# Patient Record
Sex: FEMALE | Race: WHITE | NOT HISPANIC OR LATINO | Employment: OTHER | URBAN - METROPOLITAN AREA
[De-identification: names, ages, dates, MRNs, and addresses within clinical notes are randomized per-mention and may not be internally consistent; named-entity substitution may affect disease eponyms.]

---

## 2023-03-20 ENCOUNTER — EXTERNAL HOSPITAL ADMISSION (OUTPATIENT)
Dept: PRIMARY CARE | Facility: CLINIC | Age: 88
End: 2023-03-20
Payer: MEDICARE

## 2023-03-23 ENCOUNTER — TELEPHONE (OUTPATIENT)
Dept: PRIMARY CARE | Facility: CLINIC | Age: 88
End: 2023-03-23
Payer: MEDICARE

## 2023-03-23 DIAGNOSIS — M54.30 SCIATICA, UNSPECIFIED LATERALITY: Primary | ICD-10-CM

## 2023-03-23 NOTE — TELEPHONE ENCOUNTER
Pt has pain in her sciatic nerve. She found a medicine and is wondering if she can take it? Methylprednisolone 4mg is the medicine. She says you know her best out of her doctors and would know best. She's in a lot of pain after her knee surgery.

## 2023-03-29 RX ORDER — METHYLPREDNISOLONE 4 MG/1
TABLET ORAL
Qty: 21 TABLET | Refills: 0 | Status: SHIPPED | OUTPATIENT
Start: 2023-03-29 | End: 2023-04-05

## 2023-04-28 DIAGNOSIS — K21.9 GERD WITHOUT ESOPHAGITIS: Primary | ICD-10-CM

## 2023-04-28 PROBLEM — R07.89 ATYPICAL CHEST PAIN: Status: ACTIVE | Noted: 2023-04-28

## 2023-04-28 PROBLEM — K44.9 DIAPHRAGMATIC HERNIA: Status: ACTIVE | Noted: 2023-04-28

## 2023-04-28 PROBLEM — M79.10 MYALGIA: Status: ACTIVE | Noted: 2023-04-28

## 2023-04-28 PROBLEM — L98.9 DISORDER OF SKIN OR SUBCUTANEOUS TISSUE: Status: ACTIVE | Noted: 2023-04-28

## 2023-04-28 PROBLEM — M81.0 POSTMENOPAUSAL BONE LOSS: Status: ACTIVE | Noted: 2023-04-28

## 2023-04-28 PROBLEM — L65.9 HAIR LOSS: Status: ACTIVE | Noted: 2023-04-28

## 2023-04-28 PROBLEM — R33.9 INCOMPLETE BLADDER EMPTYING: Status: ACTIVE | Noted: 2023-04-28

## 2023-04-28 PROBLEM — J00 ACUTE NASOPHARYNGITIS: Status: ACTIVE | Noted: 2023-04-28

## 2023-04-28 PROBLEM — R19.5 WATERY STOOLS: Status: ACTIVE | Noted: 2023-04-28

## 2023-04-28 PROBLEM — M17.9 OSTEOARTHRITIS OF KNEE: Status: ACTIVE | Noted: 2023-04-28
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
## 2023-04-28 PROBLEM — N81.6 RECTOCELE, FEMALE: Status: ACTIVE | Noted: 2023-04-28

## 2023-04-28 PROBLEM — R20.2 RIGHT HAND PARESTHESIA: Status: ACTIVE | Noted: 2023-04-28

## 2023-04-28 PROBLEM — M79.604 PAIN OF RIGHT LOWER EXTREMITY: Status: ACTIVE | Noted: 2023-04-28

## 2023-04-28 PROBLEM — M94.9 DISORDER OF BONE AND CARTILAGE: Status: ACTIVE | Noted: 2023-04-28

## 2023-04-28 PROBLEM — M19.90 ARTHRITIS: Status: ACTIVE | Noted: 2023-04-28

## 2023-04-28 PROBLEM — M25.559 PAIN IN JOINT INVOLVING PELVIC REGION AND THIGH: Status: ACTIVE | Noted: 2023-04-28

## 2023-04-28 PROBLEM — R58 ECCHYMOSIS: Status: ACTIVE | Noted: 2023-04-28

## 2023-04-28 PROBLEM — K85.90 ACUTE PANCREATITIS (HHS-HCC): Status: ACTIVE | Noted: 2023-04-28

## 2023-04-28 PROBLEM — G25.81 RESTLESS LEGS SYNDROME: Status: ACTIVE | Noted: 2023-04-28

## 2023-04-28 PROBLEM — R19.7 DIARRHEA, UNSPECIFIED: Status: ACTIVE | Noted: 2023-04-28

## 2023-04-28 PROBLEM — M25.562 LEFT KNEE PAIN: Status: ACTIVE | Noted: 2023-04-28

## 2023-04-28 PROBLEM — F41.9 ANXIETY: Status: ACTIVE | Noted: 2023-04-28

## 2023-04-28 PROBLEM — M75.00 ADHESIVE CAPSULITIS OF SHOULDER: Status: ACTIVE | Noted: 2023-04-28

## 2023-04-28 PROBLEM — E03.9 HYPOTHYROIDISM: Status: ACTIVE | Noted: 2023-04-28

## 2023-04-28 PROBLEM — J06.9 URI (UPPER RESPIRATORY INFECTION): Status: ACTIVE | Noted: 2023-04-28

## 2023-04-28 PROBLEM — R14.1 FLATULENCE, ERUCTATION, AND GAS PAIN: Status: ACTIVE | Noted: 2023-04-28

## 2023-04-28 PROBLEM — D64.9 ANEMIA: Status: ACTIVE | Noted: 2023-04-28

## 2023-04-28 PROBLEM — R14.2 FLATULENCE, ERUCTATION, AND GAS PAIN: Status: ACTIVE | Noted: 2023-04-28

## 2023-04-28 PROBLEM — D73.89 LESION OF SPLEEN: Status: ACTIVE | Noted: 2023-04-28

## 2023-04-28 PROBLEM — R93.3 ABNORMAL CT SCAN, COLON: Status: ACTIVE | Noted: 2023-04-28

## 2023-04-28 PROBLEM — M79.672 PAIN IN BOTH FEET: Status: ACTIVE | Noted: 2023-04-28

## 2023-04-28 PROBLEM — L25.5 CONTACT DERMATITIS DUE TO PLANT: Status: ACTIVE | Noted: 2023-04-28

## 2023-04-28 PROBLEM — R10.84 ACUTE GENERALIZED ABDOMINAL PAIN: Status: ACTIVE | Noted: 2023-04-28

## 2023-04-28 PROBLEM — T14.8XXA ABRASION: Status: ACTIVE | Noted: 2023-04-28

## 2023-04-28 PROBLEM — R30.0 DYSURIA: Status: ACTIVE | Noted: 2023-04-28

## 2023-04-28 PROBLEM — H26.9 CATARACT, LEFT: Status: ACTIVE | Noted: 2023-04-28

## 2023-04-28 PROBLEM — I10 HYPERTENSION: Status: ACTIVE | Noted: 2023-04-28

## 2023-04-28 PROBLEM — L23.7 POISON IVY: Status: ACTIVE | Noted: 2023-04-28

## 2023-04-28 PROBLEM — K57.30 DIVERTICULOSIS OF LARGE INTESTINE: Status: ACTIVE | Noted: 2023-04-28

## 2023-04-28 PROBLEM — H61.23 BILATERAL IMPACTED CERUMEN: Status: ACTIVE | Noted: 2023-04-28

## 2023-04-28 PROBLEM — G47.33 OBSTRUCTIVE SLEEP APNEA SYNDROME: Status: ACTIVE | Noted: 2023-04-28

## 2023-04-28 PROBLEM — F34.1 PRIMARY DYSTHYMIA: Status: ACTIVE | Noted: 2023-04-28

## 2023-04-28 PROBLEM — I05.9 MITRAL VALVE DISEASE: Status: ACTIVE | Noted: 2023-04-28

## 2023-04-28 PROBLEM — R14.3 FLATULENCE, ERUCTATION, AND GAS PAIN: Status: ACTIVE | Noted: 2023-04-28

## 2023-04-28 PROBLEM — M89.9 DISORDER OF BONE AND CARTILAGE: Status: ACTIVE | Noted: 2023-04-28

## 2023-04-28 PROBLEM — L23.1 ALLERGIC CONTACT DERMATITIS DUE TO ADHESIVES: Status: ACTIVE | Noted: 2023-04-28

## 2023-04-28 PROBLEM — R31.9 HEMATURIA: Status: ACTIVE | Noted: 2023-04-28

## 2023-04-28 PROBLEM — R15.9 FECAL INCONTINENCE: Status: ACTIVE | Noted: 2023-04-28

## 2023-04-28 PROBLEM — H53.9 VISION CHANGES: Status: ACTIVE | Noted: 2023-04-28

## 2023-04-28 PROBLEM — J01.00 ACUTE MAXILLARY SINUSITIS: Status: ACTIVE | Noted: 2023-04-28

## 2023-04-28 PROBLEM — G56.01 CARPAL TUNNEL SYNDROME OF RIGHT WRIST: Status: ACTIVE | Noted: 2023-04-28

## 2023-04-28 PROBLEM — N39.0 ACUTE UTI: Status: ACTIVE | Noted: 2023-04-28

## 2023-04-28 PROBLEM — E04.1 NONTOXIC SINGLE THYROID NODULE: Status: ACTIVE | Noted: 2023-04-28

## 2023-04-28 PROBLEM — R35.0 INCREASED FREQUENCY OF URINATION: Status: ACTIVE | Noted: 2023-04-28

## 2023-04-28 PROBLEM — N81.11 MIDLINE CYSTOCELE: Status: ACTIVE | Noted: 2023-04-28

## 2023-04-28 PROBLEM — T36.95XA ANTIBIOTIC-INDUCED ALLERGIC RASH: Status: ACTIVE | Noted: 2023-04-28

## 2023-04-28 PROBLEM — R20.9 SKIN SENSATION DISTURBANCE: Status: ACTIVE | Noted: 2023-04-28

## 2023-04-28 PROBLEM — R13.10 SWALLOWING DISORDER: Status: ACTIVE | Noted: 2023-04-28

## 2023-04-28 PROBLEM — R25.2 LIMB CRAMP: Status: ACTIVE | Noted: 2023-04-28

## 2023-04-28 PROBLEM — S61.411A LACERATION OF RIGHT HAND WITHOUT FOREIGN BODY: Status: ACTIVE | Noted: 2023-04-28

## 2023-04-28 PROBLEM — R35.1 NOCTURIA: Status: ACTIVE | Noted: 2023-04-28

## 2023-04-28 PROBLEM — E78.5 HYPERLIPIDEMIA: Status: ACTIVE | Noted: 2023-04-28

## 2023-04-28 PROBLEM — M79.671 PAIN IN BOTH FEET: Status: ACTIVE | Noted: 2023-04-28

## 2023-04-28 PROBLEM — M54.2 NECK PAIN: Status: ACTIVE | Noted: 2023-04-28

## 2023-04-28 PROBLEM — E55.9 VITAMIN D DEFICIENCY: Status: ACTIVE | Noted: 2023-04-28

## 2023-04-28 PROBLEM — R26.81 UNSTEADY GAIT: Status: ACTIVE | Noted: 2023-04-28

## 2023-04-28 PROBLEM — M79.89 SWELLING OF LEFT LOWER EXTREMITY: Status: ACTIVE | Noted: 2023-04-28

## 2023-04-28 PROBLEM — R41.3 MEMORY LOSS: Status: ACTIVE | Noted: 2023-04-28

## 2023-04-28 PROBLEM — R10.32 ABDOMINAL PAIN, LLQ (LEFT LOWER QUADRANT): Status: ACTIVE | Noted: 2023-04-28

## 2023-04-28 PROBLEM — L27.0 ANTIBIOTIC-INDUCED ALLERGIC RASH: Status: ACTIVE | Noted: 2023-04-28

## 2023-04-28 PROBLEM — T36.8X5A: Status: ACTIVE | Noted: 2023-04-28

## 2023-04-28 PROBLEM — K57.92 ACUTE DIVERTICULITIS: Status: ACTIVE | Noted: 2023-04-28

## 2023-04-28 RX ORDER — PANCRELIPASE 24000; 76000; 120000 [USP'U]/1; [USP'U]/1; [USP'U]/1
CAPSULE, DELAYED RELEASE PELLETS ORAL
COMMUNITY
Start: 2022-09-28 | End: 2023-06-16 | Stop reason: ALTCHOICE

## 2023-04-28 RX ORDER — PANTOPRAZOLE SODIUM 20 MG/1
1 TABLET, DELAYED RELEASE ORAL DAILY
COMMUNITY
Start: 2023-01-16 | End: 2023-05-01 | Stop reason: SDUPTHER

## 2023-04-28 RX ORDER — ATENOLOL 25 MG/1
TABLET ORAL
COMMUNITY
End: 2023-06-16 | Stop reason: ALTCHOICE

## 2023-04-28 RX ORDER — MULTIVITAMIN
1 TABLET ORAL DAILY
COMMUNITY

## 2023-04-28 RX ORDER — OMEPRAZOLE 20 MG/1
1 CAPSULE, DELAYED RELEASE ORAL DAILY
COMMUNITY
Start: 2021-11-01 | End: 2023-11-09 | Stop reason: WASHOUT

## 2023-04-28 RX ORDER — POLYETHYLENE GLYCOL 3350 17 G/17G
POWDER, FOR SOLUTION ORAL
COMMUNITY
Start: 2022-05-21 | End: 2023-06-16 | Stop reason: ALTCHOICE

## 2023-04-28 RX ORDER — TROSPIUM CHLORIDE ER 60 MG/1
1 CAPSULE ORAL DAILY
COMMUNITY
Start: 2022-12-20 | End: 2023-06-16 | Stop reason: ALTCHOICE

## 2023-04-28 RX ORDER — ONDANSETRON 4 MG/1
TABLET, FILM COATED ORAL
COMMUNITY
Start: 2022-06-16 | End: 2023-06-16 | Stop reason: ALTCHOICE

## 2023-04-28 RX ORDER — ACETAMINOPHEN 325 MG/1
TABLET ORAL EVERY 4 HOURS PRN
COMMUNITY
Start: 2022-05-21 | End: 2023-12-18 | Stop reason: WASHOUT

## 2023-04-28 RX ORDER — DOCUSATE SODIUM 100 MG/1
CAPSULE, LIQUID FILLED ORAL
COMMUNITY
Start: 2022-05-21 | End: 2023-06-16 | Stop reason: ALTCHOICE

## 2023-04-28 RX ORDER — FLUTICASONE PROPIONATE 50 MCG
2 SPRAY, SUSPENSION (ML) NASAL DAILY
COMMUNITY
Start: 2021-08-23 | End: 2023-11-09 | Stop reason: WASHOUT

## 2023-04-28 RX ORDER — CHOLECALCIFEROL (VITAMIN D3) 25 MCG
TABLET ORAL
COMMUNITY
End: 2023-06-16 | Stop reason: SDUPTHER

## 2023-04-28 RX ORDER — METOPROLOL SUCCINATE 25 MG/1
25 TABLET, EXTENDED RELEASE ORAL DAILY
COMMUNITY
End: 2023-06-16 | Stop reason: SDUPTHER

## 2023-04-28 RX ORDER — TRAMADOL HYDROCHLORIDE 50 MG/1
1 TABLET ORAL EVERY 6 HOURS PRN
COMMUNITY
Start: 2023-02-09 | End: 2023-06-16 | Stop reason: ALTCHOICE

## 2023-04-28 RX ORDER — LEVOTHYROXINE SODIUM 75 UG/1
75 TABLET ORAL DAILY
COMMUNITY
End: 2023-06-12

## 2023-05-01 RX ORDER — PANTOPRAZOLE SODIUM 20 MG/1
TABLET, DELAYED RELEASE ORAL
Qty: 90 TABLET | Refills: 0 | Status: SHIPPED | OUTPATIENT
Start: 2023-05-01 | End: 2023-06-16 | Stop reason: ALTCHOICE

## 2023-06-01 ENCOUNTER — TELEPHONE (OUTPATIENT)
Dept: PRIMARY CARE | Facility: CLINIC | Age: 88
End: 2023-06-01
Payer: MEDICARE

## 2023-06-10 DIAGNOSIS — E03.9 HYPOTHYROIDISM, UNSPECIFIED TYPE: Primary | ICD-10-CM

## 2023-06-12 PROBLEM — B34.9 ACUTE VIRAL SYNDROME: Status: ACTIVE | Noted: 2023-06-12

## 2023-06-12 PROBLEM — R21 FACIAL RASH: Status: ACTIVE | Noted: 2023-06-12

## 2023-06-12 PROBLEM — N64.4 NIPPLE PAIN: Status: ACTIVE | Noted: 2023-06-12

## 2023-06-12 PROBLEM — K57.90 DIVERTICULOSIS: Status: ACTIVE | Noted: 2023-06-12

## 2023-06-12 PROBLEM — R25.2 MUSCLE CRAMPS: Status: ACTIVE | Noted: 2023-06-12

## 2023-06-12 PROBLEM — L23.9 CONTACT DERMATITIS, ALLERGIC: Status: ACTIVE | Noted: 2023-06-12

## 2023-06-12 PROBLEM — J01.90 ACUTE SINUSITIS: Status: ACTIVE | Noted: 2023-06-12

## 2023-06-12 PROBLEM — E04.1 COLLOID THYROID NODULE: Status: ACTIVE | Noted: 2023-06-12

## 2023-06-12 PROBLEM — I34.1 MITRAL VALVE PROLAPSE: Status: ACTIVE | Noted: 2023-06-12

## 2023-06-12 PROBLEM — L98.9 SKIN LESIONS: Status: ACTIVE | Noted: 2023-06-12

## 2023-06-12 PROBLEM — L30.9 DERMATITIS: Status: ACTIVE | Noted: 2023-06-12

## 2023-06-12 PROBLEM — H61.21 IMPACTED CERUMEN OF RIGHT EAR: Status: ACTIVE | Noted: 2023-06-12

## 2023-06-12 PROBLEM — M62.81 MUSCLE WEAKNESS OF LOWER EXTREMITY: Status: ACTIVE | Noted: 2023-06-12

## 2023-06-12 PROBLEM — H26.9 CORTICAL CATARACT OF RIGHT EYE: Status: ACTIVE | Noted: 2023-06-12

## 2023-06-12 PROBLEM — G47.62 NOCTURNAL LEG CRAMPS: Status: ACTIVE | Noted: 2023-06-12
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
## 2023-06-12 PROBLEM — N30.90 CYSTITIS: Status: ACTIVE | Noted: 2023-06-12

## 2023-06-12 PROBLEM — K21.9 GASTROESOPHAGEAL REFLUX DISEASE: Status: ACTIVE | Noted: 2023-06-12

## 2023-06-12 PROBLEM — R07.89 OTHER CHEST PAIN: Status: ACTIVE | Noted: 2023-06-12

## 2023-06-12 PROBLEM — M06.372: Status: ACTIVE | Noted: 2023-06-12

## 2023-06-12 RX ORDER — ROSUVASTATIN CALCIUM 5 MG/1
1 TABLET, COATED ORAL NIGHTLY
COMMUNITY
Start: 2023-05-08 | End: 2023-06-16 | Stop reason: ALTCHOICE

## 2023-06-12 RX ORDER — CHOLECALCIFEROL (VITAMIN D3) 50 MCG
50 TABLET ORAL DAILY
COMMUNITY
End: 2023-06-16 | Stop reason: ALTCHOICE

## 2023-06-12 RX ORDER — LEVOTHYROXINE SODIUM 75 UG/1
TABLET ORAL
Qty: 90 TABLET | Refills: 0 | Status: SHIPPED | OUTPATIENT
Start: 2023-06-12 | End: 2023-06-16 | Stop reason: SDUPTHER

## 2023-06-16 ENCOUNTER — OFFICE VISIT (OUTPATIENT)
Dept: PRIMARY CARE | Facility: CLINIC | Age: 88
End: 2023-06-16
Payer: MEDICARE

## 2023-06-16 VITALS
WEIGHT: 154 LBS | RESPIRATION RATE: 14 BRPM | HEART RATE: 64 BPM | OXYGEN SATURATION: 98 % | SYSTOLIC BLOOD PRESSURE: 112 MMHG | TEMPERATURE: 96.8 F | BODY MASS INDEX: 27.29 KG/M2 | HEIGHT: 63 IN | DIASTOLIC BLOOD PRESSURE: 64 MMHG

## 2023-06-16 DIAGNOSIS — E78.5 HYPERLIPIDEMIA, UNSPECIFIED HYPERLIPIDEMIA TYPE: ICD-10-CM

## 2023-06-16 DIAGNOSIS — I10 HYPERTENSION, UNSPECIFIED TYPE: Primary | ICD-10-CM

## 2023-06-16 DIAGNOSIS — R41.3 MEMORY LOSS: ICD-10-CM

## 2023-06-16 DIAGNOSIS — E55.9 VITAMIN D DEFICIENCY: ICD-10-CM

## 2023-06-16 DIAGNOSIS — L65.9 HAIR LOSS: ICD-10-CM

## 2023-06-16 DIAGNOSIS — N64.4 BREAST TENDERNESS IN FEMALE: ICD-10-CM

## 2023-06-16 DIAGNOSIS — E03.9 HYPOTHYROIDISM, UNSPECIFIED TYPE: ICD-10-CM

## 2023-06-16 DIAGNOSIS — Z12.31 VISIT FOR SCREENING MAMMOGRAM: ICD-10-CM

## 2023-06-16 DIAGNOSIS — H53.9 VISION CHANGES: ICD-10-CM

## 2023-06-16 PROCEDURE — 3078F DIAST BP <80 MM HG: CPT | Performed by: INTERNAL MEDICINE

## 2023-06-16 PROCEDURE — 99214 OFFICE O/P EST MOD 30 MIN: CPT | Performed by: INTERNAL MEDICINE

## 2023-06-16 PROCEDURE — 1036F TOBACCO NON-USER: CPT | Performed by: INTERNAL MEDICINE

## 2023-06-16 PROCEDURE — 3074F SYST BP LT 130 MM HG: CPT | Performed by: INTERNAL MEDICINE

## 2023-06-16 PROCEDURE — 1159F MED LIST DOCD IN RCRD: CPT | Performed by: INTERNAL MEDICINE

## 2023-06-16 RX ORDER — ROSUVASTATIN CALCIUM 5 MG/1
5 TABLET, COATED ORAL NIGHTLY
Qty: 90 TABLET | Refills: 1 | Status: CANCELLED | OUTPATIENT
Start: 2023-06-16

## 2023-06-16 RX ORDER — METOPROLOL SUCCINATE 25 MG/1
25 TABLET, EXTENDED RELEASE ORAL DAILY
Qty: 90 TABLET | Refills: 1 | Status: SHIPPED | OUTPATIENT
Start: 2023-06-16 | End: 2023-11-09 | Stop reason: SDUPTHER

## 2023-06-16 RX ORDER — ATENOLOL 25 MG/1
25 TABLET ORAL DAILY
Qty: 90 TABLET | Refills: 1 | Status: CANCELLED | OUTPATIENT
Start: 2023-06-16

## 2023-06-16 RX ORDER — LEVOTHYROXINE SODIUM 75 UG/1
75 TABLET ORAL DAILY
Qty: 90 TABLET | Refills: 1 | Status: SHIPPED | OUTPATIENT
Start: 2023-06-16 | End: 2024-01-31

## 2023-06-16 RX ORDER — CHOLECALCIFEROL (VITAMIN D3) 25 MCG
25 TABLET ORAL DAILY
Qty: 90 TABLET | Refills: 1 | Status: SHIPPED | OUTPATIENT
Start: 2023-06-16 | End: 2024-02-05 | Stop reason: SDUPTHER

## 2023-06-16 SDOH — ECONOMIC STABILITY: FOOD INSECURITY: WITHIN THE PAST 12 MONTHS, YOU WORRIED THAT YOUR FOOD WOULD RUN OUT BEFORE YOU GOT MONEY TO BUY MORE.: NEVER TRUE

## 2023-06-16 SDOH — ECONOMIC STABILITY: FOOD INSECURITY: WITHIN THE PAST 12 MONTHS, THE FOOD YOU BOUGHT JUST DIDN'T LAST AND YOU DIDN'T HAVE MONEY TO GET MORE.: NEVER TRUE

## 2023-06-16 ASSESSMENT — LIFESTYLE VARIABLES
HOW OFTEN DO YOU HAVE A DRINK CONTAINING ALCOHOL: NEVER
AUDIT-C TOTAL SCORE: 0
HOW OFTEN DO YOU HAVE SIX OR MORE DRINKS ON ONE OCCASION: NEVER
SKIP TO QUESTIONS 9-10: 1
HOW MANY STANDARD DRINKS CONTAINING ALCOHOL DO YOU HAVE ON A TYPICAL DAY: PATIENT DOES NOT DRINK

## 2023-06-16 ASSESSMENT — ENCOUNTER SYMPTOMS
OCCASIONAL FEELINGS OF UNSTEADINESS: 0
DEPRESSION: 0
LOSS OF SENSATION IN FEET: 0

## 2023-06-16 ASSESSMENT — PATIENT HEALTH QUESTIONNAIRE - PHQ9
2. FEELING DOWN, DEPRESSED OR HOPELESS: NOT AT ALL
SUM OF ALL RESPONSES TO PHQ9 QUESTIONS 1 & 2: 0
1. LITTLE INTEREST OR PLEASURE IN DOING THINGS: NOT AT ALL

## 2023-06-16 ASSESSMENT — PAIN SCALES - GENERAL: PAINLEVEL: 0-NO PAIN

## 2023-06-16 NOTE — PROGRESS NOTES
"Chief Complaint/HPI:  Patient is s/p left total knee replacement per Dr Garcia, she is now active and doing well     memory issues: memory issues have not changed now, she is stable. She says her memory has been \"lousy\" she states    S/P pancreatitis: patient has no appetite now, she does get pain under the left breast in the evening. This seems to occur when the patient sits down and relaxes. She does see cardiology also. Patient does have a history of left breast surgery     right hand numbness: patient only notes numbness in the hand when she wakes up in the morning. Patient does get some left thumb pain also.     HTN: takes metoprolol. BP is good in the office today.     patient loses balance easily, this has been going on for years. Patient gets intermittent double vision, the symptoms come and go after closing and opening eyes several times. Patient has appointment with eye doctor next week.  Gets occasional HA in the evening     hyperlipidemia: patient no longer takes statin therapy, it caused leg cramps, she no longer takes it     hypothyroidism: takes levothyroxine daily     urinary frequency: urinates frequently at night, she no longer takes trospium, she has not seen Dr Ray for some time, she did not feel well after she had pancreatitis     KATHRYN: patient states that she had sleep study completed at [facility], she has not heard anything since that time, sleep study is noted in the med record     patient loses balance easily, this has been going on for years.       ROS otherwise negative aside from what was mentioned above in HPI.      Patient Active Problem List   Diagnosis    Abdominal pain, LLQ (left lower quadrant)    Abnormal CT scan, colon    Abrasion    Accidental fall    Acute diverticulitis    Acute generalized abdominal pain    Acute maxillary sinusitis    Acute nasopharyngitis    Acute pancreatitis    Acute UTI    Adhesive capsulitis of shoulder    Allergic contact dermatitis due to adhesives "    Anemia    Anxiety    Arthritis    Bilateral impacted cerumen    Carpal tunnel syndrome of right wrist    Cataract, left    Clindamycin adverse reaction    Contact dermatitis due to plant    Diaphragmatic hernia    Diarrhea, unspecified    Disorder of bone and cartilage    Disorder of skin or subcutaneous tissue    Diverticulosis of large intestine    Dysuria    Ecchymosis    Fecal incontinence    Flatulence, eructation, and gas pain    GERD without esophagitis    Hair loss    Hematuria    Hyperlipidemia    Hypertension    Hypothyroidism    Incomplete bladder emptying    Increased frequency of urination    Laceration of right hand without foreign body    Left knee pain    Lesion of spleen    Memory loss    Midline cystocele    Mitral valve disease    Limb cramp    Myalgia    Neck pain    Atypical chest pain    Nocturia    Nontoxic single thyroid nodule    Obstructive sleep apnea syndrome    Osteoarthritis of knee    Pain in both feet    Pain in joint involving pelvic region and thigh    Pain of right lower extremity    Poison ivy    Postmenopausal bone loss    Primary dysthymia    Rectocele, female    Restless legs syndrome    Right hand paresthesia    Antibiotic-induced allergic rash    Skin sensation disturbance    Swallowing disorder    Swelling of left lower extremity    Unsteady gait    URI (upper respiratory infection)    Vision changes    Vitamin D deficiency    Watery stools    Dermatitis    Unknown cause of injury    Colloid thyroid nodule    Acute sinusitis    Acute viral syndrome    Contact dermatitis, allergic    Rheumatoid nodule, left ankle and foot (CMS/HCC)    Facial rash    Skin lesions    Cystitis    Nipple pain    Other chest pain    Impacted cerumen of right ear    Cortical cataract of right eye    Diverticulosis    Gastroesophageal reflux disease    Muscle cramps    Muscle weakness of lower extremity    Nocturnal leg cramps    Mitral valve prolapse    Actinic keratosis    Carcinoma in situ of  skin    Inflamed seborrheic keratosis    Neurogenic bladder    Photoaged skin    Urethral stricture    Functional disorder of bladder         Past Medical History:   Diagnosis Date    Anesthesia of skin     Numbness and tingling in right hand    Personal history of diseases of the blood and blood-forming organs and certain disorders involving the immune mechanism     History of anemia    Personal history of other medical treatment 05/06/2021    History of screening mammography    Unspecified cataract     Cataracts, bilateral     Past Surgical History:   Procedure Laterality Date    BLADDER SURGERY  09/07/2017    Bladder Surgery    BREAST BIOPSY  09/07/2017    Biopsy Breast Open    CARDIAC SURGERY  09/07/2017    Heart Surgery    FOOT SURGERY  09/07/2017    Foot Surgery    HYSTERECTOMY  09/07/2017    Hysterectomy    OTHER SURGICAL HISTORY  09/05/2022    Uvulopalatopharyngoplasty    OTHER SURGICAL HISTORY  03/06/2020    Surgery    OTHER SURGICAL HISTORY  11/09/2019    Cardiac catheterization    OTHER SURGICAL HISTORY  11/09/2019    Tonsillectomy     Social History     Social History Narrative    Not on file         ALLERGIES  Penicillins, Caffeine, Ciprofloxacin, Donepezil, Fexofenadine, Methylprednisolone, Mirabegron, Naproxen, Norfloxacin, Other, Rivastigmine, Sulfamethoxazole-trimethoprim, Clindamycin, Nitrofurantoin, Ofloxacin, Quinolones, Sulfamethoxazole, and Trimethoprim      MEDICATIONS  Current Outpatient Medications on File Prior to Visit   Medication Sig Dispense Refill    acetaminophen (Tylenol) 325 mg tablet Take by mouth every 4 hours if needed.      fluticasone (Flonase) 50 mcg/actuation nasal spray Administer 2 sprays into affected nostril(s) once daily.      multivitamin (Daily Multi-Vitamin) tablet Take 1 tablet by mouth once daily.      omeprazole (PriLOSEC) 20 mg DR capsule Take 1 capsule (20 mg) by mouth once daily.      [DISCONTINUED] atenolol (Tenormin) 25 mg tablet Take by mouth.       "[DISCONTINUED] cholecalciferol (Vitamin D-3) 25 MCG (1000 UT) tablet Take by mouth.      [DISCONTINUED] Creon 24,000-76,000 -120,000 unit capsule       [DISCONTINUED] docusate sodium (Colace) 100 mg capsule       [DISCONTINUED] levothyroxine (Synthroid, Levoxyl) 75 mcg tablet TAKE ONE TABLET BY MOUTH once DAILY 90 tablet 0    [DISCONTINUED] metoprolol succinate XL (Toprol-XL) 25 mg 24 hr tablet Take 1 tablet (25 mg) by mouth once daily.      [DISCONTINUED] ondansetron (Zofran) 4 mg tablet TAKE ONE TABLET BY MOUTH THREE TIMES A DAY AS NEEDED FOR NAUSEA      [DISCONTINUED] pantoprazole (ProtoNix) 20 mg EC tablet TAKE ONE TABLET BY MOUTH DAILY 90 tablet 0    [DISCONTINUED] polyethylene glycol (Glycolax) 17 gram/dose powder       [DISCONTINUED] rosuvastatin (Crestor) 5 mg tablet Take 1 tablet (5 mg) by mouth once daily at bedtime.      [DISCONTINUED] traMADol (Ultram) 50 mg tablet Take 1 tablet (50 mg) by mouth every 6 hours if needed for pain.      [DISCONTINUED] trospium (Sanctura XR) 60 mg 24 hour capsule Take 1 capsule (60 mg) by mouth once daily.      [DISCONTINUED] cholecalciferol (Vitamin D-3) 50 MCG (2000 UT) tablet Take 1 tablet (50 mcg) by mouth once daily.      [DISCONTINUED] levothyroxine (Synthroid, Levoxyl) 75 mcg tablet Take 1 tablet (75 mcg) by mouth once daily.       No current facility-administered medications on file prior to visit.         PHYSICAL EXAM  /64 (BP Location: Left arm, Patient Position: Sitting, BP Cuff Size: Adult)   Pulse 64   Temp 36 °C (96.8 °F) (Temporal)   Resp 14   Ht 1.6 m (5' 3\")   Wt 69.9 kg (154 lb)   SpO2 98%   BMI 27.28 kg/m²   Body mass index is 27.28 kg/m².  CONSTITUTIONAL - well nourished, well developed elderly female, looks like stated age, in no acute distress, not ill-appearing, and not tired appearing     SKIN - normal skin color and pigmentation, normal skin turgor without rash, lesions, or nodules visualized, on exposed skin     HEAD - no trauma, " normocephalic     EYES - EOM grossly intact bilaterally, normal external eyes     NECK - supple without rigidity, no neck mass was observed, no thyromegaly or thyroid nodules     LUNGS - clear to auscultation, no wheezing, no crackles and no rales, good effort     CARDIAC - regular rate and regular rhythm, no skipped beats, no murmur, moderate tenderness inferior to the left breast is noted also     EXTREMITIES - healed scar left anterior knee     NEUROLOGICAL - CNs 2-12 grossly intact bilaterally, no focal signs     PSYCHIATRIC - alert, pleasant and cordial, age-appropriate     IMMUNOLOGIC - no cervical lymphadenopathy     ASSESSMENT/PLAN  Problem List Items Addressed This Visit       Hair loss    Relevant Orders    Zinc    Hyperlipidemia    Current Assessment & Plan     Cholesterol is slightly elevated off of all meds, encourage Mediterranean diet, she has been avoiding seeds since pancreatitis, she does follow up with Dr Melo at [facility] for pancreas issue         Relevant Orders    Comprehensive Metabolic Panel    CBC and Auto Differential    Lipid panel    Hypertension - Primary    Current Assessment & Plan     BP is controlled, no med changes         Relevant Medications    metoprolol succinate XL (Toprol-XL) 25 mg 24 hr tablet    Other Relevant Orders    Albumin , Urine Random    Comprehensive Metabolic Panel    CBC and Auto Differential    Hypothyroidism    Relevant Medications    levothyroxine (Synthroid, Levoxyl) 75 mcg tablet    Other Relevant Orders    TSH with reflex to Free T4 if abnormal    Comprehensive Metabolic Panel    CBC and Auto Differential    Memory loss    Current Assessment & Plan     Memory is stable         Relevant Orders    Comprehensive Metabolic Panel    CBC and Auto Differential    Vision changes    Current Assessment & Plan     Follow up with ophthalmology please, she occasionally gets double vision         Relevant Orders    Comprehensive Metabolic Panel    CBC and Auto Differential     Vitamin D deficiency    Current Assessment & Plan     Check labs         Relevant Medications    cholecalciferol (Vitamin D-3) 25 MCG (1000 UT) tablet    Other Relevant Orders    Vitamin D 1,25 Dihydroxy    Comprehensive Metabolic Panel    CBC and Auto Differential     Check labs as ordered, follow up with ophthalmology, may follow up in the office in 6 months, check mammogram in 9/2023 , follow up with Dr Medina also, recheck the patient in 6 months.  Gian Traylor MD

## 2023-06-16 NOTE — ASSESSMENT & PLAN NOTE
Cholesterol is slightly elevated off of all meds, encourage Mediterranean diet, she has been avoiding seeds since pancreatitis, she does follow up with Dr Melo at [facility] for pancreas issue

## 2023-08-01 DIAGNOSIS — K21.9 GERD WITHOUT ESOPHAGITIS: Primary | ICD-10-CM

## 2023-08-01 RX ORDER — TOLTERODINE 4 MG/1
4 CAPSULE, EXTENDED RELEASE ORAL
COMMUNITY
Start: 2023-06-19 | End: 2023-11-09 | Stop reason: WASHOUT

## 2023-08-01 RX ORDER — DONEPEZIL HYDROCHLORIDE 5 MG/1
5 TABLET, FILM COATED ORAL
COMMUNITY
Start: 2020-11-05 | End: 2023-11-09 | Stop reason: WASHOUT

## 2023-08-01 RX ORDER — NICOTINE POLACRILEX 2 MG
1 GUM BUCCAL
COMMUNITY
End: 2023-11-09 | Stop reason: WASHOUT

## 2023-08-01 RX ORDER — TOLTERODINE 4 MG/1
1 CAPSULE, EXTENDED RELEASE ORAL DAILY
COMMUNITY
Start: 2023-06-19 | End: 2023-11-09 | Stop reason: WASHOUT

## 2023-08-01 RX ORDER — RIVASTIGMINE 4.6 MG/24H
1 PATCH, EXTENDED RELEASE TRANSDERMAL
COMMUNITY
Start: 2020-12-04 | End: 2023-11-09 | Stop reason: WASHOUT

## 2023-08-01 RX ORDER — HYDROCORTISONE, IODOQUINOL 10; 10 MG/G; MG/G
CREAM TOPICAL
COMMUNITY
Start: 2015-09-22 | End: 2023-11-09 | Stop reason: WASHOUT

## 2023-08-01 RX ORDER — ROSUVASTATIN CALCIUM 5 MG/1
TABLET, COATED ORAL
COMMUNITY
Start: 2016-04-18 | End: 2023-11-09 | Stop reason: WASHOUT

## 2023-08-01 RX ORDER — METRONIDAZOLE 7.5 MG/G
GEL TOPICAL
COMMUNITY
Start: 2017-08-17 | End: 2023-11-09 | Stop reason: WASHOUT

## 2023-08-01 RX ORDER — PRAVASTATIN SODIUM 40 MG/1
40 TABLET ORAL EVERY OTHER DAY
COMMUNITY
End: 2023-11-09 | Stop reason: WASHOUT

## 2023-08-01 RX ORDER — PANCRELIPASE 24000; 76000; 120000 [USP'U]/1; [USP'U]/1; [USP'U]/1
1 CAPSULE, DELAYED RELEASE PELLETS ORAL
COMMUNITY
Start: 2022-09-28 | End: 2023-11-09 | Stop reason: WASHOUT

## 2023-08-01 RX ORDER — ATENOLOL 25 MG/1
25 TABLET ORAL
COMMUNITY
End: 2023-11-09 | Stop reason: WASHOUT

## 2023-08-01 RX ORDER — PANTOPRAZOLE SODIUM 20 MG/1
20 TABLET, DELAYED RELEASE ORAL DAILY
Qty: 90 TABLET | Refills: 0 | Status: SHIPPED | OUTPATIENT
Start: 2023-08-01 | End: 2023-08-04

## 2023-08-01 RX ORDER — UREA 40 %
1 CREAM (GRAM) TOPICAL
COMMUNITY
Start: 2020-06-24 | End: 2023-11-09 | Stop reason: WASHOUT

## 2023-08-01 RX ORDER — HYDROCORTISONE 25 MG/G
CREAM TOPICAL
COMMUNITY
Start: 2022-10-12 | End: 2023-11-09 | Stop reason: WASHOUT

## 2023-08-01 RX ORDER — PANTOPRAZOLE SODIUM 20 MG/1
1 TABLET, DELAYED RELEASE ORAL
COMMUNITY
Start: 2023-05-02 | End: 2023-08-01 | Stop reason: SDUPTHER

## 2023-08-01 RX ORDER — SILVER SULFADIAZINE 10 G/1000G
CREAM TOPICAL
COMMUNITY
Start: 2020-12-17 | End: 2023-11-09 | Stop reason: WASHOUT

## 2023-08-04 DIAGNOSIS — K21.9 GERD WITHOUT ESOPHAGITIS: ICD-10-CM

## 2023-08-04 RX ORDER — PANTOPRAZOLE SODIUM 20 MG/1
20 TABLET, DELAYED RELEASE ORAL DAILY
Qty: 90 TABLET | Refills: 0 | Status: SHIPPED | OUTPATIENT
Start: 2023-08-04 | End: 2024-02-13 | Stop reason: SDUPTHER

## 2023-10-04 ENCOUNTER — TELEPHONE (OUTPATIENT)
Dept: SLEEP MEDICINE | Facility: CLINIC | Age: 88
End: 2023-10-04
Payer: MEDICARE

## 2023-10-18 NOTE — TELEPHONE ENCOUNTER
Ok thank you. I sent secure msg in EPIC to Lynsey. Back in Sept 14, I placed order to change settings to 14/8 cm H2O. When I look at download, it was not changed. Pls call pt and let her know that I asked [facility] to change settings.

## 2023-10-19 ENCOUNTER — OFFICE VISIT (OUTPATIENT)
Dept: SLEEP MEDICINE | Facility: CLINIC | Age: 88
End: 2023-10-19
Payer: MEDICARE

## 2023-10-19 VITALS
RESPIRATION RATE: 16 BRPM | HEIGHT: 63 IN | SYSTOLIC BLOOD PRESSURE: 146 MMHG | TEMPERATURE: 98.7 F | HEART RATE: 68 BPM | BODY MASS INDEX: 26.05 KG/M2 | DIASTOLIC BLOOD PRESSURE: 60 MMHG | WEIGHT: 147 LBS

## 2023-10-19 DIAGNOSIS — I10 PRIMARY HYPERTENSION: ICD-10-CM

## 2023-10-19 DIAGNOSIS — G47.33 OBSTRUCTIVE SLEEP APNEA: Primary | ICD-10-CM

## 2023-10-19 PROCEDURE — 1126F AMNT PAIN NOTED NONE PRSNT: CPT | Performed by: INTERNAL MEDICINE

## 2023-10-19 PROCEDURE — 99214 OFFICE O/P EST MOD 30 MIN: CPT | Performed by: INTERNAL MEDICINE

## 2023-10-19 PROCEDURE — 1160F RVW MEDS BY RX/DR IN RCRD: CPT | Performed by: INTERNAL MEDICINE

## 2023-10-19 PROCEDURE — 1159F MED LIST DOCD IN RCRD: CPT | Performed by: INTERNAL MEDICINE

## 2023-10-19 PROCEDURE — 1036F TOBACCO NON-USER: CPT | Performed by: INTERNAL MEDICINE

## 2023-10-19 PROCEDURE — 3077F SYST BP >= 140 MM HG: CPT | Performed by: INTERNAL MEDICINE

## 2023-10-19 PROCEDURE — 3078F DIAST BP <80 MM HG: CPT | Performed by: INTERNAL MEDICINE

## 2023-10-19 ASSESSMENT — SLEEP AND FATIGUE QUESTIONNAIRES: ESS TOTAL SCORE: 4

## 2023-10-19 NOTE — PROGRESS NOTES
[facility] SLEEP [facility]  FOLLOW-UP VISIT NOTE    Clinic Location: [facility]  Service Date: 10/19/2023  PCP: Gian Traylor MD    HISTORY OF PRESENT ILLNESS     Patient ID: Consuelo Andres is a 89 y.o. female who presents for follow-up of KATHRYN after new machine setup.     Patient is here alone today.  To review, patient's medical history is notable for HTN, MVP, seasonal allergies, GERD, hypothyroidism, and KATHRYN s/p UPPP, previously on CPAP with CPAP intolerance.     Interval History  Patient was last seen in 8/15/2023. Plan was to start PAP therapy.  Since last visit, patient has not been using machine due to pressure and mask intolerance.  She denies symptoms of cataplexy, RLS, parasomnias, and shift-work disorder.     SLEEP STUDY HISTORY (personally reviewed raw data such as interpretation report, data sheet, hypnogram, and titration table if available and applicable)  HSAT 3/17/2022: AHI 33.4, RDI 49.7, SpO2 madai 73%, Spent 18 mins with SpO2<89%  PAP titration 9/23/2022: CPAP was started at 4-13 cm H2O. Due to CPAP intolerance at higher CPAP settings, BPAP 13/8 cm H2O was started and increased to 16/8 cm H2O. Due to TECSA at BPAP 15/8 cm H2O, BUR 14 was added which resolved all central events. Hypoxemia and snoring improved as soon as BPAP wss started. No optimal pressure noted in supine sleep. At BPAP-S/T 15/8 cm H2O and BUR 14, RDI 4.8, SpO2 madai 95% but no REM nor supine sleep was present at this pressure setting.    SLEEP-WAKE SCHEDULE  Bedtime: 10:30 PM to 11 PM daily  Subjective sleep latency: 10-15 minutes, sometimes 30 minutes  Difficulty falling asleep: no  Number of awakenings: 1-2 times per night to go to bathroom  Nocturia: yes  Falls back asleep right away  Difficulty staying asleep: yes  Final wake time: 7 AM daily  Out of bed time: 7 AM daily  Shift work: no  Naps: 2-3 times per week for 40 minutes each nap  Feels rested after a nap: Yes  "  Average sleep duration (excluding naps): 8 hours    SLEEP ENVIRONMENT  Sleep location: bed at night, couch during naps  Sleep status: sleeps alone  Preferred sleep position: stomach and side  TV in bedroom: no  Room is dark:  Yes  Room is quiet: Yes  Room is cool: Yes    SOCIAL HISTORY  Smoking: no  ETOH: no  Marijuana: no  Caffeine: no  Sleep aids: no    WEIGHT: stable    Claustrophobia: No     Active Problems, Allergy List, Medication List, and PMH/PSH/FH/Social Hx have been reviewed and reconciled in chart. No significant changes unless documented in the pertinent chart section. Updates made when necessary.     PHYSICAL EXAM     VITAL SIGNS: /60   Pulse 68   Temp 37.1 °C (98.7 °F)   Resp 16   Ht 1.6 m (5' 3\")   Wt 66.7 kg (147 lb)   BMI 26.04 kg/m²     NECK CIRCUMFERENCE: 12.5 inches    Today ESS: 4  Last visit ESS: 4  CARLTON: 0    RESULTS/DATA     Iron (ug/dL)   Date Value   08/23/2021 115   07/29/2021 155 (H)     Iron Saturation (%)   Date Value   08/23/2021 42   07/29/2021 57 (H)     TIBC (ug/dL)   Date Value   08/23/2021 276   07/29/2021 270     Ferritin (ug/L)   Date Value   07/29/2021 93       Bicarbonate   Date Value Ref Range Status   01/13/2023 29 21 - 32 mmol/L Final     Comment:      12:49 01/13/2023.  Results faxed to Dr. Traylor  , 01/13/2023 12:49         ASSESSMENT/PLAN     Assessment/Plan   Problem List Items Addressed This Visit             ICD-10-CM    Hypertension I10     BP slightly high today.  Untreated sleep apnea can lead to new onset hypertension, resistant hypertension, or refractory hypertension.  Continue anti-hypertensive medications per PCP.  Supportive management: low salt DASH diet (less than 2000 mg sodium intake daily), moderate intensity aerobic exercise at least 30 minutes 5 days per week, reduce stress, quit smoking, limit alcohol, lose weight, and monitor BP once daily             Complex sleep apnea syndrome - Primary G47.31     Patient is not using BPAP-S/T " "at home.  Patient questions diagnosis of KATHRYN on HST and would like to do an in-lab sleep study for confirmation.  Ordered diagnostic PSG and no split with further instructions of \"have patient sleep the way she sleeps at home, no need to get supine sleep if patient does not want to.\"                All of patient's questions were answered. She verbalizes understanding and agreement with my assessment and plan.    Follow-up 2-3 weeks after testing  "

## 2023-10-30 ENCOUNTER — TELEPHONE (OUTPATIENT)
Dept: PRIMARY CARE | Facility: CLINIC | Age: 88
End: 2023-10-30
Payer: MEDICARE

## 2023-10-30 DIAGNOSIS — E04.1 COLLOID THYROID NODULE: Primary | ICD-10-CM

## 2023-10-30 NOTE — TELEPHONE ENCOUNTER
PT IS HAVING PAIN LEFT SIDE OF NECK AND LEFT SHOULDER. SHE HAS A NODULE THAT YOU USUALLY CHECK ON? DO YOU WANT TO SEND AN XRAY OR WHATEVER YOU DO TO CHECK ON IT? OR DO YOU JUST WANT HER TO COME IN TO SEE YOU?

## 2023-10-31 PROBLEM — G47.31 COMPLEX SLEEP APNEA SYNDROME: Status: ACTIVE | Noted: 2023-04-28

## 2023-10-31 PROBLEM — G47.39 COMPLEX SLEEP APNEA SYNDROME: Status: ACTIVE | Noted: 2023-04-28

## 2023-11-01 NOTE — TELEPHONE ENCOUNTER
Talked with pt and she would like an Ultrasound of her Thyroid.  She doesn't believe she has ever had one.  Our next available appt isn't until December and she already has appt in December.  Would you like her to come in earlier?

## 2023-11-01 NOTE — ASSESSMENT & PLAN NOTE
BP slightly high today.  Untreated sleep apnea can lead to new onset hypertension, resistant hypertension, or refractory hypertension.  Continue anti-hypertensive medications per PCP.  Supportive management: low salt DASH diet (less than 2000 mg sodium intake daily), moderate intensity aerobic exercise at least 30 minutes 5 days per week, reduce stress, quit smoking, limit alcohol, lose weight, and monitor BP once daily

## 2023-11-01 NOTE — PATIENT INSTRUCTIONS
"Thank you for coming to the [facility] today! Your sleep medicine provider today was: Debo Noel MD Below is a summary of your treatment plan, patient education, other important information, and our contact numbers.      TREATMENT PLAN     - Get the following sleep study scheduled: diagnostic in-lab sleep study and no split  - Please read the \"Patient Education\" section below for more detailed information. Try implementing tips, reminders, strategies, and supportive management.   - If not yet done, please sign up for "[employer] to make a future schedule, send prescription requests, or send messages.    Additional patient handouts given today:   None    Referrals:  We are referring you the the following:  None    Follow-up Appointment:   Follow-up 2-3 weeks after sleep study.    PATIENT EDUCATION     Obstructive Sleep Apnea (KATHRYN) is a sleep disorder where your upper airway muscles relax during sleep and the airway intermittently and repetitively narrows and collapses leading to partially blocked airway (hypopnea) or completely blocked airway (apnea) which, in turn, can disrupt breathing in sleep, lower oxygen levels while you sleep and cause night time wakings. Because both apnea and hypopnea may cause higher carbon dioxide or low oxygen levels, untreated KATHRYN can lead to heart arrhythmia, elevation of blood pressure, and make it harder for the body to consolidate memory and facilitate metabolism (leading to higher blood sugars at night). Frequent partial arousals occur during sleep resulting in sleep deprivation and daytime sleepiness. KATHRYN is associated with an increased risk of cardiovascular disease, stroke, hypertension, and insulin resistance. Moreover, untreated KATHRYN with excessive daytime sleepiness can increase the risk of motor vehicular accidents.    Some conservative strategies for KATHRYN regardless of KATHRYN severity are:   Positional therapy - Avoid sleeping on your back.   Healthy diet and regular " exercise to optimize weight is highly encouraged.   Avoid alcohol late in the evening and sedative-hypnotics as these substances can make sleep apnea worse.   Improve breathing through the nose with intranasal steroid spray, saline rinse, or antihistamines    Safety: Avoid driving vehicle and operating heavy equipment while sleepy. Drowsy driving may lead to life-threatening motor vehicle accidents. A person driving while sleepy is 5 times more likely to have an accident. If you feel sleepy, pull over and take a short power nap (sleep for less than 30 minutes). Otherwise, ask somebody to drive you.    Treatment options for sleep apnea include weight management, positional therapy, Positive Airway Therapy (PAP) therapy, oral appliance therapy, hypoglossal nerve stimulator (Inspire) and select airway surgeries.    Sleep Testing for sleep apnea: The best and ideal way to check out if you have sleep apnea is to do an overnight sleep study in the sleep laboratory. Alternatively, a home sleep apnea test can also be done depending on your insurance and risk factors.     If you are having a home sleep apnea test, kindly allot 1 hour during pickup of the testing kit as you will have to complete paperwork and listen to the sleep technician for in-person on-the-spot demonstration and instructions on how to hook up the testing kit at home. Do the test for 1 day and start off with sleeping on your back. If you sleep on your side in the middle of night or you have always been a side or stomach-sleeper, it is ok as long as you have some time on your back and off-back.     If you are having an overnight in-lab sleep study, please make sure to bring toiletries, a comfy pillow, additional warm blankets, and any nighttime medications (include as-needed inhaler, pain pill, etc) that you may regularly take. Also, be sure to eat dinner before you arrive as we generally do not provide meals inside the sleep testing center. Lastly, in  order to fall asleep faster in the sleep testing center, we advise patients to wake up 2 hours earlier on the morning of scheduled testing and avoid napping 2 days prior testing. Sometimes, your sleep provider may prescribe a sleep aid to be taken at lights out in the sleep testing center. If you are taking a sleep aid, consider having somebody pick you up after the sleep testing.    Overnight sleep studies may be scheduled on a weekday or weekend. We also perform daytime testing for shift workers on a case-by-case basis.    Once you have booked an appointment to do the sleep study, please contact my office for follow-up visit to discuss results.    On the other hand, if you have any of the following, please consider calling the sleep testing center to RESCHEDULE your sleep study appointment:  If you tested positive for COVID within 10 days of your sleep study appointment.  If you were exposed to somebody who was confirmed for COVID within 10 days of your sleep study appointment and now you are having symptoms of possible COVID  If you have fever>100F or any acute symptoms that you think will lead to poor sleep during testing (e.g. new or worsening stuffy nose not relieved by steroid nasal spray)  If you have traveled domestically or internationally in the last month and now you are having symptoms of possible COVID    You can also go to the following EDUCATION WEBSITES for further information:   American Academy of Sleep Medicine http://sleepeducation.org  National Sleep Foundation: https://sleepfoundation.org  American Sleep Apnea Association: https://www.sleepapnea.org (for patients with sleep apnea)  Narcolepsy Network: https://www.narcolepsynetwork.org (for patients with narcolepsy)  [employer]: https://www.wakeupnarcolepsy.org (for patients with narcolepsy)  Hypersomnia Foundation: https://www.hypersomniafoundation.org (for patients with idiopathic hypersomnia)  RLS foundation: https://www.rls.org  (for patients with restless leg syndrome)    IMPORTANT INFORMATION     Call 911 for medical emergencies.  Our offices are generally open from Monday-Friday, 8 am - 5 pm.   There are no supporting services by either the sleep doctors or their staff on weekends and Holidays, or after 5 PM on weekdays.   If you need to get in touch with me, you may either call my office number or you can use [employer].  If a referral for a test, for CPAP, or for another specialist was made, and you have not heard about scheduling this within a week, please call scheduling at [phone]).  If you are unable to make your appointment for clinic or an overnight study, kindly call the office or sleep testing center at least 48 hours in advance to cancel and reschedule.  If you are on CPAP, please bring your device's card and/or the device to each clinic appointment.   In case of problems with PAP machine or mask interface, please contact your DME (Durable Medical Equipment) company first. DME is the company who provides you the machine and/or PAP supplies.       PRESCRIPTIONS     We require 7 days advanced notice for prescription refills. If we do not receive the request in this time, we cannot guarantee that your medication will be refilled in time.    IMPORTANT PHONE NUMBERS     Sleep Medicine Clinic Fax: [phone]  Appointments (for Pediatric Sleep Clinic): [phone] - option 1  Appointments (for Adult Sleep Clinic): [phone] - option 2  Appointments (For Sleep Studies): [phone] - option 3  Behavioral Sleep Medicine: [phone]  Sleep Surgery: [phone]  Nutrition Service: [phone]  Weight management clinics with endocrinology: [phone]  Bariatric Services: [phone] (includes weight loss medications and weight loss surgery)  [facility]: [phone] (offers holistic approaches to weight management)  ENT (Otolaryngology): [phone]  Headache Clinic (Neurology):  [phone]  Neurology: [phone]  Psychiatry: [phone]  Pulmonary Function Testing (PFT) Center: [phone]  Pulmonary Medicine: [phone]  [facility] [employer] ([employer]): [phone]      OUR SLEEP TESTING LOCATIONS     Our team will contact you to schedule your sleep study, however, you can contact us as follow:  Main Phone Line (scheduling only): [phone]), option 3  Adult and Pediatric Locations  [facility] (6 years and older): [facility] - 4th floor ([address]) After hours line: [phone]  [facility] (Main campus: All ages): [facility], 6th floor. After hours line: [phone]   [place] (5 years and older; younger considered on case-by-case basis): [address], [address]. Scheduling  After hours line: [phone]   [place] (6 years and older): [address]; [address]; [address]   [place] (6 years and older): [address]  After hours line: [phone]   [facility] (13 years and older) in [place]: [address], 2nd floor  After hours line: [phone]  [facility] (13 year and older): [address]  After hours line: [phone]     Adult Only Locations:   [place] (18 years and older): [address] [facility], 2nd floor   [place] (18 years and older): [address] [facility]; 4th floor  After hours line: [phone]  [facility] (18 years and older) at [place]: [address] [facility]  After hours line: [phone]     CONTACTING YOUR SLEEP MEDICINE PROVIDER AND SLEEP TEAM      For issues with your machine or mask interface, please call your DME provider first. [employer] stands for durable medical company. DME is the company who provides you the machine and/or PAP supplies / accessories.   To schedule, cancel, or reschedule SLEEP STUDY APPOINTMENTS, please call the Main Phone Line at [phone] - option 3.  "  To schedule, cancel, or reschedule CLINIC APPOINTMENTS, you can do it in \"MyChart\", call [phone] to speak with my  (Magdalene Castro), or call the Main Phone Line at [phone] - option 2  For CLINICAL QUESTIONS or MEDICATION REFILLS, please call direct line for Adult Sleep Nurses at [phone].   Lastly, you can also send a message directly to your provider through \"My Chart\", which is the email service through your  Records Account: [email or web address]       Here at [facility], we wish you a restful sleep!    "

## 2023-11-04 PROBLEM — H53.2 MONOCULAR DIPLOPIA OF LEFT EYE: Status: ACTIVE | Noted: 2021-07-21

## 2023-11-04 PROBLEM — N64.4 BREAST TENDERNESS: Status: ACTIVE | Noted: 2023-11-04

## 2023-11-04 PROBLEM — H04.123 DRY EYE SYNDROME, BILATERAL: Status: ACTIVE | Noted: 2023-11-04

## 2023-11-09 ENCOUNTER — OFFICE VISIT (OUTPATIENT)
Dept: CARDIOLOGY | Facility: CLINIC | Age: 88
End: 2023-11-09
Payer: MEDICARE

## 2023-11-09 ENCOUNTER — HOSPITAL ENCOUNTER (OUTPATIENT)
Dept: RADIOLOGY | Facility: HOSPITAL | Age: 88
Discharge: HOME | End: 2023-11-09
Payer: MEDICARE

## 2023-11-09 VITALS
SYSTOLIC BLOOD PRESSURE: 118 MMHG | BODY MASS INDEX: 27.82 KG/M2 | DIASTOLIC BLOOD PRESSURE: 76 MMHG | WEIGHT: 157 LBS | HEIGHT: 63 IN | HEART RATE: 79 BPM

## 2023-11-09 DIAGNOSIS — R07.89 ATYPICAL CHEST PAIN: ICD-10-CM

## 2023-11-09 DIAGNOSIS — E04.1 COLLOID THYROID NODULE: ICD-10-CM

## 2023-11-09 DIAGNOSIS — I34.1 MITRAL VALVE PROLAPSE: ICD-10-CM

## 2023-11-09 DIAGNOSIS — E78.49 OTHER HYPERLIPIDEMIA: ICD-10-CM

## 2023-11-09 DIAGNOSIS — I10 HYPERTENSION, UNSPECIFIED TYPE: Primary | ICD-10-CM

## 2023-11-09 PROCEDURE — 76536 US EXAM OF HEAD AND NECK: CPT | Performed by: STUDENT IN AN ORGANIZED HEALTH CARE EDUCATION/TRAINING PROGRAM

## 2023-11-09 PROCEDURE — 1159F MED LIST DOCD IN RCRD: CPT | Performed by: INTERNAL MEDICINE

## 2023-11-09 PROCEDURE — 1126F AMNT PAIN NOTED NONE PRSNT: CPT | Performed by: INTERNAL MEDICINE

## 2023-11-09 PROCEDURE — 93000 ELECTROCARDIOGRAM COMPLETE: CPT | Performed by: INTERNAL MEDICINE

## 2023-11-09 PROCEDURE — 3078F DIAST BP <80 MM HG: CPT | Performed by: INTERNAL MEDICINE

## 2023-11-09 PROCEDURE — 3074F SYST BP LT 130 MM HG: CPT | Performed by: INTERNAL MEDICINE

## 2023-11-09 PROCEDURE — 1160F RVW MEDS BY RX/DR IN RCRD: CPT | Performed by: INTERNAL MEDICINE

## 2023-11-09 PROCEDURE — 1036F TOBACCO NON-USER: CPT | Performed by: INTERNAL MEDICINE

## 2023-11-09 PROCEDURE — 76536 US EXAM OF HEAD AND NECK: CPT

## 2023-11-09 PROCEDURE — 99214 OFFICE O/P EST MOD 30 MIN: CPT | Performed by: INTERNAL MEDICINE

## 2023-11-09 RX ORDER — METOPROLOL SUCCINATE 25 MG/1
25 TABLET, EXTENDED RELEASE ORAL DAILY
Qty: 90 TABLET | Refills: 3 | Status: SHIPPED | OUTPATIENT
Start: 2023-11-09 | End: 2024-11-08

## 2023-11-09 NOTE — PROGRESS NOTES
Chief Complaint:   No chief complaint on file.     History Of Present Illness:    Consuelo Andres is a 89 y.o. female presenting for 6 month follow up.  H/o HTN, DL, mitral valve prolapse, KATHRYN - has not used her CPAP for many years.    Consuelo has been feeling well.  She does report a pain every once in a while in her left breast area that lasts a few seconds and goes away, occurring randomly.  This has been going on for 3-4 months.  She denies exertional chest pain/pressure, LE edema, orthopnea, PND.  She does have some dyspnea when doing heavy work in the yard. Takes her medicines regularly.  Tells me she had a sleep study and was diagnosed with sleep apnea - unable to tolerate her CPAP due to high pressures.    ROS:  The remainder of the review of systems was obtained, as was negative as pertains to the chief complaint.    Prior CV Testing Reviewed:  TTE 5/12/22:   1. The left ventricular systolic function is normal with a 60-65% estimated ejection fraction.  2. Spectral Doppler shows an impaired relaxation pattern of left ventricular diastolic filling.  3. There is a moderate pleural effusion.     NM Cardiac stress test 3/21/22:  1. Normal stress myocardial perfusion imaging in response to  pharmacologic stress.  2. Well-maintained left ventricular function.      Last Recorded Vitals:  There were no vitals filed for this visit.    Past Medical History:  She has a past medical history of Anesthesia of skin, Personal history of diseases of the blood and blood-forming organs and certain disorders involving the immune mechanism, Personal history of other medical treatment (05/06/2021), and Unspecified cataract.    Past Surgical History:  She has a past surgical history that includes Bladder surgery (09/07/2017); Breast biopsy (09/07/2017); Cardiac surgery (09/07/2017); Foot surgery (09/07/2017); Hysterectomy (09/07/2017); Other surgical history (09/05/2022); Other surgical history (03/06/2020); Other surgical history  (11/09/2019); and Other surgical history (11/09/2019).      Social History:  She reports that she has never smoked. She has never used smokeless tobacco. She reports that she does not drink alcohol and does not use drugs.    Family History:  Family History   Problem Relation Name Age of Onset    Other (cva) Mother      Coronary artery disease Father      Coronary artery disease Brother      Other (malignant neoplasm) Brother      Stomach cancer Brother          Allergies:  Penicillins, Caffeine, Ciprofloxacin, Donepezil, Fexofenadine, Lanolin, Lipase-protease-amylase, Methylprednisolone, Mirabegron, Naproxen, Norfloxacin, Other, Rivastigmine, Sulfamethoxazole-trimethoprim, Clindamycin, Nitrofurantoin, Ofloxacin, Quinolones, Sulfamethoxazole, and Trimethoprim    Outpatient Medications:  Current Outpatient Medications   Medication Instructions    acetaminophen (Tylenol) 325 mg tablet oral, Every 4 hours PRN    atenolol (TENORMIN) 25 mg, oral, Daily RT    biotin 1 mg capsule 1 capsule, oral, Daily RT    cholecalciferol (VITAMIN D-3) 25 mcg, oral, Daily    donepezil (ARICEPT) 5 mg, oral    fluticasone (Flonase) 50 mcg/actuation nasal spray 2 sprays, nasal, Daily    hydrocortisone 2.5 % cream Apply to affected areas on face and hands twice daily for 2 weeks, then once daily for 2 weeks, then as needed for maintenance.    hydrocortisone-iodoquinoL (Dermazene) 1-1 % cream cream Apply to itchy rash under the breasts 2 times daily for up to a week    levothyroxine (SYNTHROID, LEVOXYL) 75 mcg, oral, Daily    lipase-protease-amylase (Creon) 24,000-76,000 -120,000 unit capsule 1 capsule, oral, 3 times daily with meals    metoprolol succinate XL (TOPROL-XL) 25 mg, oral, Daily    metroNIDAZOLE (Metrogel) 0.75 % gel Apply twice a day to nose    multivitamin (Daily Multi-Vitamin) tablet 1 tablet, oral, Daily    omeprazole (PriLOSEC) 20 mg DR capsule 1 capsule, oral, Daily    pantoprazole (PROTONIX) 20 mg, oral, Daily    pravastatin  (PRAVACHOL) 40 mg, oral, Every other day    rivastigmine (Exelon) 4.6 mg/24 hour 1 patch, transdermal    rosuvastatin (Crestor) 5 mg tablet patient takes every other day    silver sulfADIAZINE (SSD) 1 % cream Apply to affected area(S) ONCE DAILY AS DIRECTED.    tolterodine LA (Detrol LA) 4 mg 24 hr capsule 1 capsule, oral, Daily    tolterodine LA (DETROL LA) 4 mg, oral, Daily RT    urea (Carmol) 40 % cream 1 Application, Topical    vibegron 75 mg, oral, Daily RT       Physical Exam:  Physical Exam  Constitutional:       Appearance: Normal appearance.   HENT:      Head: Normocephalic.      Mouth/Throat:      Mouth: Mucous membranes are moist.   Eyes:      Extraocular Movements: Extraocular movements intact.   Cardiovascular:      Rate and Rhythm: Normal rate and regular rhythm.      Comments: No carotid bruits  Pulmonary:      Effort: Pulmonary effort is normal.   Abdominal:      Palpations: Abdomen is soft.   Musculoskeletal:         General: Normal range of motion.      Cervical back: Normal range of motion and neck supple.   Skin:     General: Skin is warm and dry.   Neurological:      General: No focal deficit present.      Mental Status: She is alert.   Psychiatric:         Mood and Affect: Mood normal.            Last Labs:  CBC -  Lab Results   Component Value Date    WBC 6.6 01/13/2023    HGB 14.6 01/13/2023    HCT 44.9 01/13/2023    MCV 94 01/13/2023     01/13/2023       CMP -  Lab Results   Component Value Date    CALCIUM 9.8 01/13/2023    PROT 6.9 01/13/2023    ALBUMIN 4.2 01/13/2023    AST 18 01/13/2023    ALT 10 01/13/2023    ALKPHOS 64 01/13/2023    BILITOT 1.0 01/13/2023       LIPID PANEL -   Lab Results   Component Value Date    CHOL 207 (H) 01/13/2023    TRIG 136 01/13/2023    HDL 60.1 01/13/2023    CHHDL 3.4 01/13/2023    LDLF 120 (H) 01/13/2023    VLDL 27 01/13/2023       RENAL FUNCTION PANEL -   Lab Results   Component Value Date    GLUCOSE 88 01/13/2023     01/13/2023    K 4.4  01/13/2023     01/13/2023    CO2 29 01/13/2023    ANIONGAP 12 01/13/2023    BUN 22 01/13/2023    CREATININE 0.89 01/13/2023    CALCIUM 9.8 01/13/2023    ALBUMIN 4.2 01/13/2023        Lab Results   Component Value Date    BNP 66 05/25/2022       Assessment/Plan       Atypical chest pain:  pinpoint pain in the left breast, random, lasting a few seconds   -to call us if this pain becomes more severe/frequent or occurs with exertion, at which point we would check a stress test    Hypertension  Today BP in office   Well controlled  -continue metoprolol - given Rx     Dyslipidemia  Lipid panel 1/14/2023: Cholesterol 207 HDL 60  triglycerides 136  Last visit in 5/23 was started on rosuvastatin 5 mg daily  -to have rpt FLP checked by PCP     History of MV prolapse  TTE 5/12/22: Mitral Valve: The mitral valve is normal in structure. There is no evidence of mitral valve regurgitation.  Today, patient denies any shortness of breath.  -monitor clinically and re-check TTE if develops HF sx      Keila Dennis MD

## 2023-11-09 NOTE — PATIENT INSTRUCTIONS
Thanks for following up in office today.    1)  You need to let me know if the pain in your chest is happening while you exert yourself or if it becomes more frequent.    2)  Please continue your cardiac medications as prescribed.    Follow up with Artur Wu in 6 months  If you have any questions, please call [phone] and choose option for Dr. Dennis's nurse Ciera Baez

## 2023-11-21 ENCOUNTER — PROCEDURE VISIT (OUTPATIENT)
Dept: SLEEP MEDICINE | Facility: CLINIC | Age: 88
End: 2023-11-21
Payer: MEDICARE

## 2023-11-21 DIAGNOSIS — G47.33 OBSTRUCTIVE SLEEP APNEA: ICD-10-CM

## 2023-11-21 PROCEDURE — 95811 POLYSOM 6/>YRS CPAP 4/> PARM: CPT | Performed by: INTERNAL MEDICINE

## 2023-11-22 VITALS
SYSTOLIC BLOOD PRESSURE: 118 MMHG | BODY MASS INDEX: 27.81 KG/M2 | DIASTOLIC BLOOD PRESSURE: 76 MMHG | HEIGHT: 63 IN | WEIGHT: 156.97 LBS

## 2023-11-22 ASSESSMENT — SLEEP AND FATIGUE QUESTIONNAIRES
HOW LIKELY ARE YOU TO NOD OFF OR FALL ASLEEP WHEN YOU ARE A PASSENGER IN A CAR FOR AN HOUR WITHOUT A BREAK: WOULD NEVER DOZE
HOW LIKELY ARE YOU TO NOD OFF OR FALL ASLEEP IN A CAR, WHILE STOPPED FOR A FEW MINUTES IN TRAFFIC: WOULD NEVER DOZE
ESS-CHAD TOTAL SCORE: 4
HOW LIKELY ARE YOU TO NOD OFF OR FALL ASLEEP WHILE LYING DOWN TO REST IN THE AFTERNOON WHEN CIRCUMSTANCES PERMIT: HIGH CHANCE OF DOZING
HOW LIKELY ARE YOU TO NOD OFF OR FALL ASLEEP WHILE WATCHING TV: WOULD NEVER DOZE
HOW LIKELY ARE YOU TO NOD OFF OR FALL ASLEEP WHILE SITTING AND TALKING TO SOMEONE: WOULD NEVER DOZE
HOW LIKELY ARE YOU TO NOD OFF OR FALL ASLEEP WHILE SITTING AND READING: SLIGHT CHANCE OF DOZING
HOW LIKELY ARE YOU TO NOD OFF OR FALL ASLEEP WHILE SITTING QUIETLY AFTER LUNCH WITHOUT ALCOHOL: WOULD NEVER DOZE
SITING INACTIVE IN A PUBLIC PLACE LIKE A CLASS ROOM OR A MOVIE THEATER: WOULD NEVER DOZE

## 2023-11-22 NOTE — PROGRESS NOTES
[facility] TECH NOTE:     Patient: Consuelo Andres   MRN//AGE: [id number]  1934  89 y.o.   Technologist: SINDHU Doshi   Room: 4   Service Date: 2023        [facility]    TECHNOLOGIST SLEEP STUDY PROCEDURE NOTE:   This sleep study is being conducted according to the policies and procedures outlined by the AAS accreditation standards.  The sleep study procedure and processes involved during this appointment was explained to the patient/patient’s family, questions were answered. The patient/family verbalized understanding.      The patient is a 89 y.o. year old female scheduled for aDiagnostic PSG Split night with montage of:  Standard .    she arrived for her appointment.      The study that was ultimately completed was aDiagnostic PSG Split night with montage of:  standard .    The full study Was completed.  Patient questionnaires completed?: yes     Consents signed? yes    Initial Fall Risk Screening:     Consuelo has not fallen in the last 6 months. her did not result in injury. Consuelo does have a fear of falling. He does not need assistance with sitting, standing, or walking. she does not need assistance walking in her home. she does not need assistance in an unfamiliar setting. The patient is notusing an assistive device.     Brief Study observations: The script indicated the study was not to be split and to sleep in whatever  position she wanted to mimic home sleep.  Respiratory events were present.  Mild snoring was heard.  No significant ECG arrhythmias.  No PLM's.  Sleep was obtained on the left and right sides.    Deviation to order/protocol and reason: NA      If PAP, which was preferred mask/pressure/mode: NA      Other:None    After the procedure, the patient/family was informed to ensure followup with ordering clinician for testing results.      Technologist: SINDHU Doshi

## 2023-12-13 ENCOUNTER — LAB (OUTPATIENT)
Dept: LAB | Facility: LAB | Age: 88
End: 2023-12-13
Payer: MEDICARE

## 2023-12-13 DIAGNOSIS — E78.5 HYPERLIPIDEMIA, UNSPECIFIED HYPERLIPIDEMIA TYPE: ICD-10-CM

## 2023-12-13 DIAGNOSIS — R41.3 MEMORY LOSS: ICD-10-CM

## 2023-12-13 DIAGNOSIS — H53.9 VISION CHANGES: ICD-10-CM

## 2023-12-13 DIAGNOSIS — E03.9 HYPOTHYROIDISM, UNSPECIFIED TYPE: ICD-10-CM

## 2023-12-13 DIAGNOSIS — L65.9 HAIR LOSS: ICD-10-CM

## 2023-12-13 DIAGNOSIS — I10 HYPERTENSION, UNSPECIFIED TYPE: ICD-10-CM

## 2023-12-13 DIAGNOSIS — E55.9 VITAMIN D DEFICIENCY: ICD-10-CM

## 2023-12-13 LAB
ALBUMIN SERPL BCP-MCNC: 4.1 G/DL (ref 3.4–5)
ALP SERPL-CCNC: 63 U/L (ref 33–136)
ALT SERPL W P-5'-P-CCNC: 11 U/L (ref 7–45)
ANION GAP SERPL CALC-SCNC: 10 MMOL/L (ref 10–20)
AST SERPL W P-5'-P-CCNC: 20 U/L (ref 9–39)
BASOPHILS # BLD AUTO: 0.07 X10*3/UL (ref 0–0.1)
BASOPHILS NFR BLD AUTO: 0.9 %
BILIRUB SERPL-MCNC: 1.1 MG/DL (ref 0–1.2)
BUN SERPL-MCNC: 22 MG/DL (ref 6–23)
CALCIUM SERPL-MCNC: 9.1 MG/DL (ref 8.6–10.3)
CHLORIDE SERPL-SCNC: 104 MMOL/L (ref 98–107)
CHOLEST SERPL-MCNC: 200 MG/DL (ref 0–199)
CHOLESTEROL/HDL RATIO: 3.5
CO2 SERPL-SCNC: 29 MMOL/L (ref 21–32)
CREAT SERPL-MCNC: 0.97 MG/DL (ref 0.5–1.05)
CREAT UR-MCNC: 180.2 MG/DL (ref 20–320)
EOSINOPHIL # BLD AUTO: 0.25 X10*3/UL (ref 0–0.4)
EOSINOPHIL NFR BLD AUTO: 3.3 %
ERYTHROCYTE [DISTWIDTH] IN BLOOD BY AUTOMATED COUNT: 13.2 % (ref 11.5–14.5)
GFR SERPL CREATININE-BSD FRML MDRD: 56 ML/MIN/1.73M*2
GLUCOSE SERPL-MCNC: 86 MG/DL (ref 74–99)
HCT VFR BLD AUTO: 47.6 % (ref 36–46)
HDLC SERPL-MCNC: 57.5 MG/DL
HGB BLD-MCNC: 15.3 G/DL (ref 12–16)
IMM GRANULOCYTES # BLD AUTO: 0.02 X10*3/UL (ref 0–0.5)
IMM GRANULOCYTES NFR BLD AUTO: 0.3 % (ref 0–0.9)
LDLC SERPL CALC-MCNC: 120 MG/DL
LYMPHOCYTES # BLD AUTO: 2.05 X10*3/UL (ref 0.8–3)
LYMPHOCYTES NFR BLD AUTO: 27 %
MCH RBC QN AUTO: 31.3 PG (ref 26–34)
MCHC RBC AUTO-ENTMCNC: 32.1 G/DL (ref 32–36)
MCV RBC AUTO: 97 FL (ref 80–100)
MICROALBUMIN UR-MCNC: 205.4 MG/L
MICROALBUMIN/CREAT UR: 114 UG/MG CREAT
MONOCYTES # BLD AUTO: 0.66 X10*3/UL (ref 0.05–0.8)
MONOCYTES NFR BLD AUTO: 8.7 %
NEUTROPHILS # BLD AUTO: 4.55 X10*3/UL (ref 1.6–5.5)
NEUTROPHILS NFR BLD AUTO: 59.8 %
NON HDL CHOLESTEROL: 143 MG/DL (ref 0–149)
NRBC BLD-RTO: 0 /100 WBCS (ref 0–0)
PLATELET # BLD AUTO: 222 X10*3/UL (ref 150–450)
POTASSIUM SERPL-SCNC: 4.3 MMOL/L (ref 3.5–5.3)
PROT SERPL-MCNC: 6.3 G/DL (ref 6.4–8.2)
RBC # BLD AUTO: 4.89 X10*6/UL (ref 4–5.2)
SODIUM SERPL-SCNC: 139 MMOL/L (ref 136–145)
T4 FREE SERPL-MCNC: 0.94 NG/DL (ref 0.61–1.12)
TRIGL SERPL-MCNC: 112 MG/DL (ref 0–149)
TSH SERPL-ACNC: 4.42 MIU/L (ref 0.44–3.98)
VLDL: 22 MG/DL (ref 0–40)
WBC # BLD AUTO: 7.6 X10*3/UL (ref 4.4–11.3)

## 2023-12-13 PROCEDURE — 82570 ASSAY OF URINE CREATININE: CPT

## 2023-12-13 PROCEDURE — 84439 ASSAY OF FREE THYROXINE: CPT

## 2023-12-13 PROCEDURE — 80053 COMPREHEN METABOLIC PANEL: CPT

## 2023-12-13 PROCEDURE — 85025 COMPLETE CBC W/AUTO DIFF WBC: CPT

## 2023-12-13 PROCEDURE — 82043 UR ALBUMIN QUANTITATIVE: CPT

## 2023-12-13 PROCEDURE — 82652 VIT D 1 25-DIHYDROXY: CPT

## 2023-12-13 PROCEDURE — 80061 LIPID PANEL: CPT

## 2023-12-13 PROCEDURE — 84630 ASSAY OF ZINC: CPT

## 2023-12-13 PROCEDURE — 84443 ASSAY THYROID STIM HORMONE: CPT

## 2023-12-13 PROCEDURE — 36415 COLL VENOUS BLD VENIPUNCTURE: CPT

## 2023-12-15 LAB
1,25(OH)2D3 SERPL-MCNC: 49.9 PG/ML (ref 19.9–79.3)
ZINC SERPL-MCNC: 79.1 UG/DL (ref 60–120)

## 2023-12-18 ENCOUNTER — OFFICE VISIT (OUTPATIENT)
Dept: PRIMARY CARE | Facility: CLINIC | Age: 88
End: 2023-12-18
Payer: MEDICARE

## 2023-12-18 VITALS
SYSTOLIC BLOOD PRESSURE: 129 MMHG | OXYGEN SATURATION: 97 % | RESPIRATION RATE: 16 BRPM | HEART RATE: 74 BPM | WEIGHT: 157.8 LBS | TEMPERATURE: 97.3 F | HEIGHT: 62 IN | BODY MASS INDEX: 29.04 KG/M2 | DIASTOLIC BLOOD PRESSURE: 72 MMHG

## 2023-12-18 DIAGNOSIS — E04.1 THYROID NODULE: ICD-10-CM

## 2023-12-18 DIAGNOSIS — M06.372: ICD-10-CM

## 2023-12-18 DIAGNOSIS — I10 PRIMARY HYPERTENSION: ICD-10-CM

## 2023-12-18 DIAGNOSIS — G56.01 CARPAL TUNNEL SYNDROME OF RIGHT WRIST: ICD-10-CM

## 2023-12-18 DIAGNOSIS — E55.9 VITAMIN D DEFICIENCY: ICD-10-CM

## 2023-12-18 DIAGNOSIS — E03.9 ACQUIRED HYPOTHYROIDISM: ICD-10-CM

## 2023-12-18 DIAGNOSIS — R15.9 INCONTINENCE OF FECES, UNSPECIFIED FECAL INCONTINENCE TYPE: ICD-10-CM

## 2023-12-18 DIAGNOSIS — N18.31 STAGE 3A CHRONIC KIDNEY DISEASE (MULTI): ICD-10-CM

## 2023-12-18 DIAGNOSIS — Z00.00 ROUTINE GENERAL MEDICAL EXAMINATION AT HEALTH CARE FACILITY: Primary | ICD-10-CM

## 2023-12-18 DIAGNOSIS — M81.0 POSTMENOPAUSAL BONE LOSS: ICD-10-CM

## 2023-12-18 DIAGNOSIS — E78.49 OTHER HYPERLIPIDEMIA: ICD-10-CM

## 2023-12-18 DIAGNOSIS — R35.0 INCREASED FREQUENCY OF URINATION: ICD-10-CM

## 2023-12-18 PROBLEM — E86.0 DEHYDRATION: Status: ACTIVE | Noted: 2022-05-12

## 2023-12-18 PROBLEM — Z86.2 HISTORY OF ANEMIA: Status: ACTIVE | Noted: 2023-12-18

## 2023-12-18 PROBLEM — S89.90XA: Status: ACTIVE | Noted: 2023-12-18

## 2023-12-18 PROCEDURE — 1036F TOBACCO NON-USER: CPT | Performed by: INTERNAL MEDICINE

## 2023-12-18 PROCEDURE — 3078F DIAST BP <80 MM HG: CPT | Performed by: INTERNAL MEDICINE

## 2023-12-18 PROCEDURE — 1159F MED LIST DOCD IN RCRD: CPT | Performed by: INTERNAL MEDICINE

## 2023-12-18 PROCEDURE — G0439 PPPS, SUBSEQ VISIT: HCPCS | Performed by: INTERNAL MEDICINE

## 2023-12-18 PROCEDURE — 1126F AMNT PAIN NOTED NONE PRSNT: CPT | Performed by: INTERNAL MEDICINE

## 2023-12-18 PROCEDURE — 99214 OFFICE O/P EST MOD 30 MIN: CPT | Performed by: INTERNAL MEDICINE

## 2023-12-18 PROCEDURE — 3074F SYST BP LT 130 MM HG: CPT | Performed by: INTERNAL MEDICINE

## 2023-12-18 PROCEDURE — 1160F RVW MEDS BY RX/DR IN RCRD: CPT | Performed by: INTERNAL MEDICINE

## 2023-12-18 PROCEDURE — 1170F FXNL STATUS ASSESSED: CPT | Performed by: INTERNAL MEDICINE

## 2023-12-18 ASSESSMENT — ACTIVITIES OF DAILY LIVING (ADL)
DOING_HOUSEWORK: INDEPENDENT
DRESSING: INDEPENDENT
MANAGING_FINANCES: INDEPENDENT
GROCERY_SHOPPING: INDEPENDENT
BATHING: INDEPENDENT
TAKING_MEDICATION: INDEPENDENT

## 2023-12-18 ASSESSMENT — LIFESTYLE VARIABLES
AUDIT-C TOTAL SCORE: 0
HOW OFTEN DO YOU HAVE A DRINK CONTAINING ALCOHOL: NEVER
HOW MANY STANDARD DRINKS CONTAINING ALCOHOL DO YOU HAVE ON A TYPICAL DAY: PATIENT DOES NOT DRINK
HOW OFTEN DO YOU HAVE SIX OR MORE DRINKS ON ONE OCCASION: NEVER
SKIP TO QUESTIONS 9-10: 1

## 2023-12-18 ASSESSMENT — ENCOUNTER SYMPTOMS
LOSS OF SENSATION IN FEET: 0
OCCASIONAL FEELINGS OF UNSTEADINESS: 1
DEPRESSION: 0

## 2023-12-18 ASSESSMENT — PATIENT HEALTH QUESTIONNAIRE - PHQ9
1. LITTLE INTEREST OR PLEASURE IN DOING THINGS: NOT AT ALL
SUM OF ALL RESPONSES TO PHQ9 QUESTIONS 1 & 2: 0
2. FEELING DOWN, DEPRESSED OR HOPELESS: NOT AT ALL

## 2023-12-18 NOTE — ASSESSMENT & PLAN NOTE
TSH is slightly elevated, will increase the levothyroxine, refer to endocrine surgery for evaluation of thyroid nodule

## 2023-12-18 NOTE — PROGRESS NOTES
"Subjective   Patient ID: Consuelo Andres is a 89 y.o. female who presents for Medicare Annual Wellness Visit Subsequent and Follow-up (Pt states she is having trouble urinating and moving her bowels. It's been going on for about a year and a half./She's been forgetful as well. She's stopped taking the medication you prescribed for memory issues).    HPI  Follow up and Medicare wellness visit    Health maintenance: Overall patient is doing well.   Immunization: Tdap completed in 2020.  Influenza vaccine completed this year.  Shingrix ans pneumococcal up to date.  COVID-19 vaccine up to date.  Colon Cancer Screening: No family history.  OB/GYN: Mammogram is up to date.  Diet: Moderate fat diet.   Exercise: Daily walks.  Tobacco: Denies use  EtOH: Rarely Socially      Diarrhea/abdominal pain/Fecal incontinence/Pancreatitis: Recent telephone visit with Dr. Burns (Gastroenterology) at [facility] in [place].  He recommended she advance to moderate fat diet. Abdominal CT was ordered, however she needs to schedule this for sometime in January. She normally has a bowel movement daily in the morning, however certain foods/liquids can increase that to 3-4 times. She has intermittent fecal incontinence during these episodes. Patient no longer takes Creon.     urinary frequency/incontinence: H/o uterine prolapse. The patient states that she can go from seated to standing and notices that she leaks some urine. Urinates frequently at night, she no longer takes trospium, she has not seen Dr Ray (urogynecology) for some time. Patient will follow up with urogynecology.     memory issues: memory issues have not changed now, she is stable. She says her memory has been \"lousy\" she states. She is no longer taking the medication for memory that was previously prescribed.     right hand numbness: patient only notes numbness in the hand when she wakes up in the morning. She also has thenar numbness as well. She has no complaints at this " "time.     HTN/MV prolapse: Currently on metoprolol. Tolerating well.   BP today in triage showed 129/72. She had some palpitations yesterday.  Denies any headaches, dizziness, vision changes, chest pain or pressure SOB, ALMANZA, LE edema, or any other complaints.      patient loses balance easily, this has been going on for years. Patient gets intermittent double vision, the symptoms come and go after closing and opening eyes several times. Patient has not fallen recently, \"I do not take any chances\"     hyperlipidemia: patient no longer takes statin therapy, it caused leg cramps, she no longer takes it. 12/13/23 labs demonstrated cholesterol of 200.      hypothyroidism: takes levothyroxine 75 mcg  daily. Labs were recently completed    Recent thyroid ultrasound with multiple nodules appreciated. Right sided 25 mm TIRADS 3 nodule that appears to have increased in size since 2019 US. FNA appears to be recommended at this size at this time.    Denies any weight changes, confusion, heat or cold intolerance.    KATHRYN: patient states that she had sleep study completed at [facility]. She does not wear a CPAP.    Current Outpatient Medications on File Prior to Visit   Medication Sig Dispense Refill    cholecalciferol (Vitamin D-3) 25 MCG (1000 UT) tablet Take 1 tablet (25 mcg) by mouth once daily. 90 tablet 1    levothyroxine (Synthroid, Levoxyl) 75 mcg tablet Take 1 tablet (75 mcg) by mouth once daily. 90 tablet 1    metoprolol succinate XL (Toprol-XL) 25 mg 24 hr tablet Take 1 tablet (25 mg) by mouth once daily. 90 tablet 3    multivitamin (Daily Multi-Vitamin) tablet Take 1 tablet by mouth once daily.      pantoprazole (ProtoNix) 20 mg EC tablet TAKE ONE TABLET BY MOUTH DAILY 90 tablet 0    [DISCONTINUED] acetaminophen (Tylenol) 325 mg tablet Take by mouth every 4 hours if needed.       No current facility-administered medications on file prior to visit.        Allergies   Allergen Reactions    Penicillins Anaphylaxis and " Hives    Caffeine Other and Unknown    Ciprofloxacin Diarrhea    Donepezil Unknown    Fexofenadine Unknown    Lanolin Unknown     burning    Lipase-Protease-Amylase Unknown     Pt states she can't remember, but that she knows she had a reaction.    Methylprednisolone Unknown    Mirabegron Unknown    Naproxen Unknown    Norfloxacin Other    Other Other     Pain in stomach,difficulty urinating,headache    Rivastigmine Unknown    Sulfamethoxazole-Trimethoprim Diarrhea and Hives    Clindamycin Itching and Rash    Nitrofurantoin Rash    Ofloxacin Rash    Quinolones Diarrhea, Hives and Rash    Sulfamethoxazole Rash    Trimethoprim Rash       Immunization History   Administered Date(s) Administered    Flu vaccine (IIV4), preservative free *Check age/dose* 09/14/2020    Flu vaccine, quadrivalent, high-dose, preservative free, age 65y+ (FLUZONE) 10/05/2021, 10/03/2022    Influenza, High Dose Seasonal, Preservative Free 10/01/2013, 10/07/2014, 10/26/2016, 10/20/2017, 09/27/2018    Influenza, Seasonal, Quadrivalent, Adjuvanted 09/24/2023    Influenza, Unspecified 10/07/2011, 09/11/2012, 10/05/2015    Influenza, seasonal, injectable 10/05/2015    Influenza, trivalent, adjuvanted 10/17/2019    Novel influenza-H1N1-09, preservative-free 12/18/2009    Pfizer Gray Cap SARS-CoV-2 04/26/2022    Pfizer Purple Cap SARS-CoV-2 02/02/2021, 02/23/2021, 12/07/2021, 04/26/2022    Pneumococcal conjugate vaccine, 13-valent (PREVNAR 13) 11/20/2014    Pneumococcal polysaccharide vaccine, 23-valent, age 2 years and older (PNEUMOVAX 23) 06/18/2018    Tdap vaccine, age 7 year and older (BOOSTRIX) 07/15/2020    Zoster vaccine, recombinant, adult (SHINGRIX) 03/02/2022    Zoster, live 10/01/2015         Review of Systems  All pertinent positive symptoms are included in the history of present illness.  All other systems have been reviewed and are negative and noncontributory to this patient's current ailments.     Objective   /72 (BP Location:  "Right arm, Patient Position: Sitting, BP Cuff Size: Adult)   Pulse 74   Temp 36.3 °C (97.3 °F)   Resp 16   Ht 1.575 m (5' 2\")   Wt 71.6 kg (157 lb 12.8 oz)   SpO2 97%   BMI 28.86 kg/m²   BSA: 1.77 meters squared  Lab on 12/13/2023   Component Date Value Ref Range Status    Thyroid Stimulating Hormone 12/13/2023 4.42 (H)  0.44 - 3.98 mIU/L Final    Albumin, Urine Random 12/13/2023 205.4  Not established mg/L Final    Creatinine, Urine Random 12/13/2023 180.2  20.0 - 320.0 mg/dL Final    Albumin/Creatine Ratio 12/13/2023 114.0 (H)  <30.0 ug/mg Creat Final    Vit D, 1,25-Dihydroxy 12/13/2023 49.9  19.9 - 79.3 pg/mL Final    INTERPRETIVE INFORMATION: Vitamin D, 1,25-Dihydroxy    This test is primarily indicated during patient evaluation for   hypercalcemia and renal failure. A normal result does not rule out   Vitamin D deficiency. The recommended test for diagnosing Vitamin   D deficiency is Vitamin D 25-hydroxy.  Performed By: [facility]  [address] [place] [id number]  : Nimesh Sanchez MD, PhD  CLIA Number: [id number]    Glucose 12/13/2023 86  74 - 99 mg/dL Final    Sodium 12/13/2023 139  136 - 145 mmol/L Final    Potassium 12/13/2023 4.3  3.5 - 5.3 mmol/L Final    Chloride 12/13/2023 104  98 - 107 mmol/L Final    Bicarbonate 12/13/2023 29  21 - 32 mmol/L Final    Anion Gap 12/13/2023 10  10 - 20 mmol/L Final    Urea Nitrogen 12/13/2023 22  6 - 23 mg/dL Final    Creatinine 12/13/2023 0.97  0.50 - 1.05 mg/dL Final    eGFR 12/13/2023 56 (L)  >60 mL/min/1.73m*2 Final    Calculations of estimated GFR are performed using the 2021 CKD-EPI Study Refit equation without the race variable for the IDMS-Traceable creatinine methods.  https://jasn.asnjournals.org/content/early/2021/09/22/ASN.7092168753    Calcium 12/13/2023 9.1  8.6 - 10.3 mg/dL Final    Albumin 12/13/2023 4.1  3.4 - 5.0 g/dL Final    Alkaline Phosphatase 12/13/2023 63  33 - 136 U/L Final    Total Protein " 12/13/2023 6.3 (L)  6.4 - 8.2 g/dL Final    AST 12/13/2023 20  9 - 39 U/L Final    Bilirubin, Total 12/13/2023 1.1  0.0 - 1.2 mg/dL Final    ALT 12/13/2023 11  7 - 45 U/L Final    Patients treated with Sulfasalazine may generate falsely decreased results for ALT.    WBC 12/13/2023 7.6  4.4 - 11.3 x10*3/uL Final    nRBC 12/13/2023 0.0  0.0 - 0.0 /100 WBCs Final    RBC 12/13/2023 4.89  4.00 - 5.20 x10*6/uL Final    Hemoglobin 12/13/2023 15.3  12.0 - 16.0 g/dL Final    Hematocrit 12/13/2023 47.6 (H)  36.0 - 46.0 % Final    MCV 12/13/2023 97  80 - 100 fL Final    MCH 12/13/2023 31.3  26.0 - 34.0 pg Final    MCHC 12/13/2023 32.1  32.0 - 36.0 g/dL Final    RDW 12/13/2023 13.2  11.5 - 14.5 % Final    Platelets 12/13/2023 222  150 - 450 x10*3/uL Final    Neutrophils % 12/13/2023 59.8  40.0 - 80.0 % Final    Immature Granulocytes %, Automated 12/13/2023 0.3  0.0 - 0.9 % Final    Immature Granulocyte Count (IG) includes promyelocytes, myelocytes and metamyelocytes but does not include bands. Percent differential counts (%) should be interpreted in the context of the absolute cell counts (cells/UL).    Lymphocytes % 12/13/2023 27.0  13.0 - 44.0 % Final    Monocytes % 12/13/2023 8.7  2.0 - 10.0 % Final    Eosinophils % 12/13/2023 3.3  0.0 - 6.0 % Final    Basophils % 12/13/2023 0.9  0.0 - 2.0 % Final    Neutrophils Absolute 12/13/2023 4.55  1.60 - 5.50 x10*3/uL Final    Percent differential counts (%) should be interpreted in the context of the absolute cell counts (cells/uL).    Immature Granulocytes Absolute, Au* 12/13/2023 0.02  0.00 - 0.50 x10*3/uL Final    Lymphocytes Absolute 12/13/2023 2.05  0.80 - 3.00 x10*3/uL Final    Monocytes Absolute 12/13/2023 0.66  0.05 - 0.80 x10*3/uL Final    Eosinophils Absolute 12/13/2023 0.25  0.00 - 0.40 x10*3/uL Final    Basophils Absolute 12/13/2023 0.07  0.00 - 0.10 x10*3/uL Final    Zinc, Serum or Plasma 12/13/2023 79.1  60.0 - 120.0 ug/dL Final    Comment: INTERPRETIVE INFORMATION:  Zinc, Serum or Plasma    Elevated results may be due to skin or collection-related   contamination, including the use of a noncertified metal-free   collection/transport tube. If contamination concerns exist due to   elevated levels of serum/plasma zinc, confirmation with a second   specimen collected in a certified metal-free tube is recommended.    Circulating zinc concentrations are dependent on albumin status   and are depressed with malnutrition.  Zinc may also be lowered   with infection, inflammation, stress, oral contraceptives, and   pregnancy.  Zinc may be elevated with zinc supplementation or   fasting.  Elevated zinc concentrations may interfere with copper   absorption.     This test was developed and its performance characteristics   determined by [facility]. It has not been cleared or   approved by the US Food and Drug Administration. This test was   performed in a CLIA certified laboratory and is intended for   clinical                            purposes.  Performed By: [facility]  [address] [place] [id number]  : Nimesh Sanchez MD, PhD  CLIA Number: [id number]    Cholesterol 12/13/2023 200 (H)  0 - 199 mg/dL Final          Age      Desirable   Borderline High   High     0-19 Y     0 - 169       170 - 199     >/= 200    20-24 Y     0 - 189       190 - 224     >/= 225         >24 Y     0 - 199       200 - 239     >/= 240   **All ranges are based on fasting samples. Specific   therapeutic targets will vary based on patient-specific   cardiac risk.    Pediatric guidelines reference:Pediatrics 2011, 128(S5).Adult guidelines reference: NCEP ATPIII Guidelines,JOY 2001, 258:2486-97    Venipuncture immediately after or during the administration of Metamizole may lead to falsely low results. Testing should be performed immediately prior to Metamizole dosing.    HDL-Cholesterol 12/13/2023 57.5  mg/dL Final      Age       Very Low   Low     Normal    High     0-19 Y    < 35      < 40     40-45     ----  20-24 Y    ----     < 40      >45      ----        >24 Y      ----     < 40     40-60      >60      Cholesterol/HDL Ratio 12/13/2023 3.5   Final      Ref Values  Desirable  < 3.4  High Risk  > 5.0    LDL Calculated 12/13/2023 120 (H)  <=99 mg/dL Final                                Near   Borderline      AGE      Desirable  Optimal    High     High     Very High     0-19 Y     0 - 109     ---    110-129   >/= 130     ----    20-24 Y     0 - 119     ---    120-159   >/= 160     ----      >24 Y     0 -  99   100-129  130-159   160-189     >/=190      VLDL 12/13/2023 22  0 - 40 mg/dL Final    Triglycerides 12/13/2023 112  0 - 149 mg/dL Final       Age         Desirable   Borderline High   High     Very High   0 D-90 D    19 - 174         ----         ----        ----  91 D- 9 Y     0 -  74        75 -  99     >/= 100      ----    10-19 Y     0 -  89        90 - 129     >/= 130      ----    20-24 Y     0 - 114       115 - 149     >/= 150      ----         >24 Y     0 - 149       150 - 199    200- 499    >/= 500    Venipuncture immediately after or during the administration of Metamizole may lead to falsely low results. Testing should be performed immediately prior to Metamizole dosing.    Non HDL Cholesterol 12/13/2023 143  0 - 149 mg/dL Final          Age       Desirable   Borderline High   High     Very High     0-19 Y     0 - 119       120 - 144     >/= 145    >/= 160    20-24 Y     0 - 149       150 - 189     >/= 190      ----         >24 Y    30 mg/dL above LDL Cholesterol goal      Thyroxine, Free 12/13/2023 0.94  0.61 - 1.12 ng/dL Final       Physical Exam  CONSTITUTIONAL - well nourished, well developed, looks like stated age, in no acute distress, not ill-appearing, and not tired appearing  SKIN - normal skin color and pigmentation, normal skin turgor without rash, lesions, or nodules visualized on exposed skin  HEAD - no trauma, normocephalic  EYES - pupils are  equal and reactive to light, extraocular muscles are intact, and normal external exam, wears glasses  ENT - auricles are intact  NECK - supple without rigidity, no neck mass was observed, no thyromegaly or thyroid nodules  CHEST - clear to auscultation, no wheezing, no crackles and no rales, good effort  CARDIAC - regular rate and regular rhythm, no skipped beats, no murmur, no carotid bruits noted  ABDOMEN - no examined today  EXTREMITIES - no edema, no deformities  NEUROLOGICAL - normal gait, normal balance, normal motor, no ataxia, DTRs equal and symmetrical; alert, oriented and no focal signs  PSYCHIATRIC - alert, pleasant and cordial, age-appropriate  IMMUNOLOGIC - no cervical lymphadenopathy     Assessment/Plan   Problem List Items Addressed This Visit       Carpal tunnel syndrome of right wrist    Relevant Orders    Comprehensive Metabolic Panel    CBC and Auto Differential    Fecal incontinence     Sees GI at [facility]         Relevant Orders    Comprehensive Metabolic Panel    CBC and Auto Differential    Hyperlipidemia     Patient takes no meds, will continue to monitor, she is 89 years old         Relevant Orders    Comprehensive Metabolic Panel    CBC and Auto Differential    Hypertension     BP is stable at present, no changes         Relevant Orders    Comprehensive Metabolic Panel    Albumin , Urine Random    CBC and Auto Differential    Hypothyroidism     TSH is slightly elevated, will increase the levothyroxine, refer to endocrine surgery for evaluation of thyroid nodule         Relevant Orders    Referral to General Surgery    TSH with reflex to Free T4 if abnormal    Comprehensive Metabolic Panel    CBC and Auto Differential    Increased frequency of urination     Refer to Dr Ray for reassessment         Relevant Orders    Comprehensive Metabolic Panel    CBC and Auto Differential    Referral to Urogynecology    Postmenopausal bone loss    Relevant Orders    Comprehensive Metabolic Panel    CBC and  Auto Differential    XR DEXA bone density    Vitamin D deficiency    Relevant Orders    Comprehensive Metabolic Panel    CBC and Auto Differential    Vitamin D 25-Hydroxy,Total (for eval of Vitamin D levels)    Rheumatoid nodule, left ankle and foot (CMS/HCC)    Relevant Orders    Comprehensive Metabolic Panel    CBC and Auto Differential    Routine general medical examination at [facility] facility - Primary     Check a DEXA scan, Medicare wellness exam completed today         Relevant Orders    Comprehensive Metabolic Panel    CBC and Auto Differential    Stage 3a chronic kidney disease (CMS/[facility])    Relevant Orders    Comprehensive Metabolic Panel    Albumin , Urine Random    CBC and Auto Differential     Other Visit Diagnoses       Thyroid nodule        Relevant Orders    Referral to General Surgery    TSH with reflex to Free T4 if abnormal    Comprehensive Metabolic Panel    CBC and Auto Differential        Medicare wellness exam completed.   Check labs as ordered, may follow up in 6 months

## 2023-12-26 ENCOUNTER — APPOINTMENT (OUTPATIENT)
Dept: SURGERY | Facility: CLINIC | Age: 88
End: 2023-12-26
Payer: MEDICARE

## 2024-01-02 ENCOUNTER — APPOINTMENT (OUTPATIENT)
Dept: SURGERY | Facility: CLINIC | Age: 89
End: 2024-01-02
Payer: MEDICARE

## 2024-01-08 ENCOUNTER — APPOINTMENT (OUTPATIENT)
Dept: SURGERY | Facility: CLINIC | Age: 89
End: 2024-01-08
Payer: MEDICARE

## 2024-01-09 ENCOUNTER — TELEPHONE (OUTPATIENT)
Dept: PRIMARY CARE | Facility: CLINIC | Age: 89
End: 2024-01-09
Payer: MEDICARE

## 2024-01-09 DIAGNOSIS — K57.92 ACUTE DIVERTICULITIS: ICD-10-CM

## 2024-01-09 DIAGNOSIS — K57.90 DIVERTICULOSIS: Primary | ICD-10-CM

## 2024-01-09 NOTE — TELEPHONE ENCOUNTER
Pt went to [facility] ER last night and was dx with diverticulitis. Pt wanted to make you aware and wants to know if you want to see her or not?

## 2024-01-09 NOTE — TELEPHONE ENCOUNTER
Pt called back in and stated she was told to also ask for a referral to someone who specializes in Diverticulitis. Pt is wanting to know who PCP would recommend and if a referral can be placed? Please advise.

## 2024-01-10 NOTE — TELEPHONE ENCOUNTER
Pt called in and was read this message. Pt was given Metronidazole and Cefdinir at the ER on 1/8/2024. Pt wanted to be sure she isn't allergic to them as she is having diarrhea, weakness, and numb feet. Per Puja, pt is not allergic to these rx's based on our records and both diverticulitis and these rx's can cause weakness and diarrhea. Per Puja, advised the patient Puja isn't sure what is causing the numb feet and recommended a minute clinic for patient. Patient was agreeable to same and will call GI in [place].

## 2024-01-11 ENCOUNTER — OFFICE VISIT (OUTPATIENT)
Dept: GASTROENTEROLOGY | Facility: CLINIC | Age: 89
End: 2024-01-11
Payer: MEDICARE

## 2024-01-11 VITALS — HEART RATE: 74 BPM | BODY MASS INDEX: 28.34 KG/M2 | WEIGHT: 154 LBS | HEIGHT: 62 IN

## 2024-01-11 DIAGNOSIS — R19.7 DIARRHEA, UNSPECIFIED TYPE: Primary | ICD-10-CM

## 2024-01-11 DIAGNOSIS — K57.92 DIVERTICULITIS: ICD-10-CM

## 2024-01-11 DIAGNOSIS — R10.9 ABDOMINAL PAIN, UNSPECIFIED ABDOMINAL LOCATION: ICD-10-CM

## 2024-01-11 PROCEDURE — 1160F RVW MEDS BY RX/DR IN RCRD: CPT | Performed by: INTERNAL MEDICINE

## 2024-01-11 PROCEDURE — 1126F AMNT PAIN NOTED NONE PRSNT: CPT | Performed by: INTERNAL MEDICINE

## 2024-01-11 PROCEDURE — 1036F TOBACCO NON-USER: CPT | Performed by: INTERNAL MEDICINE

## 2024-01-11 PROCEDURE — 99215 OFFICE O/P EST HI 40 MIN: CPT | Performed by: INTERNAL MEDICINE

## 2024-01-11 RX ORDER — CEFDINIR 300 MG/1
300 CAPSULE ORAL DAILY
COMMUNITY
Start: 2024-01-08 | End: 2024-02-05 | Stop reason: WASHOUT

## 2024-01-11 RX ORDER — METRONIDAZOLE 500 MG/1
500 TABLET ORAL 3 TIMES DAILY
COMMUNITY
Start: 2024-01-08 | End: 2024-02-05 | Stop reason: WASHOUT

## 2024-01-11 RX ORDER — ROSUVASTATIN CALCIUM 5 MG/1
5 TABLET, COATED ORAL NIGHTLY
COMMUNITY
Start: 2024-01-03 | End: 2024-02-05 | Stop reason: WASHOUT

## 2024-01-11 ASSESSMENT — ENCOUNTER SYMPTOMS: SHORTNESS OF BREATH: 0

## 2024-01-11 NOTE — PATIENT INSTRUCTIONS
Stick to a low residue/low fiber diet for now until feeling better.    Check stool test for C. Diff.    Complete antibiotics as prescribed.    If worsening symptoms such as diarrhea, bleeding, or abdominal pain then please go to the emergency room for urgent evaluation.    Follow-up with either at [facility] or here in 6-8 weeks to reassess.

## 2024-01-11 NOTE — PROGRESS NOTES
"REASON FOR VISIT:  Diverticulitis   PCP (requesting provider): Gian Traylor MD.    HPI:  Consuelo Andres is a 89 y.o. female with a past medical history of KATHRYN, CAD, hypothyroidism, HTN, and HLD following for history of recurrent diverticulitis. CT pancreas protocol showed resolved pancreatitis and unchanged sub-centimeter splenic lesion (1/2023). Stool pancreatic elastase (9/2022). CT A/P w/ contrast showed acute sigmoid diverticulitis and small hiatal hernia (1/2024). She has been following with Dr. Eugene Burns at [facility] recently for pancreatic lesion now resolved after episode of acute idiopathic pancreatitis and he recommended referral to Colorectal Surgery regarding recurrent episodes of diverticulitis.    The patient reports she was in [facility] for abdominal pain. She was bloated. She went to the ED as noted and was told she had UTI and diverticulitis. She was prescribed Cefdinir and Metronidazole. She notes she had a small amount of blood in the stool. She notes having diarrhea. She did not complete any stool testing. She has extensive allergy list with multiple antibiotics \"allergies\" but all appear to be more intolerance as opposed to true allergy. She appears to get diarrhea with antibiotics. She reports she stopped antibiotics today due to loose stool. She was prescribed a 1 week course of antibiotics. She reports that she has not made an appointment to see Colorectal Surgery. Abdominal pain does seem to be improving. No regular NSAIDs.    PSurgHx:  -Ventral hernia repair with mesh  -Hysterectomy with BSO     FamHx: No GI cancer      Prior Endoscopy:  -EGD/EUS (7/2022): Normal esophagus, normal stomach, normal duodenum, mild chronic pancreatitis  -Colonoscopy (4/2016): Excellent prep, descending and sigmoid diverticulosis, small IH/EH, otherwise normal colon.  -Colonoscopy (9/2010): Good prep, mild pan-diverticulosis, small IH/EH, otherwise normal colon.     PAST MEDICAL HISTORY  Past Medical " History:   Diagnosis Date    Anesthesia of skin     Numbness and tingling in right hand    Personal history of diseases of the blood and blood-forming organs and certain disorders involving the immune mechanism     History of anemia    Personal history of other medical treatment 05/06/2021    History of screening mammography    Unspecified cataract     Cataracts, bilateral       PAST SURGICAL HISTORY  Past Surgical History:   Procedure Laterality Date    BLADDER SURGERY  09/07/2017    Bladder Surgery    BREAST BIOPSY  09/07/2017    Biopsy Breast Open    CARDIAC SURGERY  09/07/2017    Heart Surgery    FOOT SURGERY  09/07/2017    Foot Surgery    HYSTERECTOMY  09/07/2017    Hysterectomy    OTHER SURGICAL HISTORY  09/05/2022    Uvulopalatopharyngoplasty    OTHER SURGICAL HISTORY  03/06/2020    Surgery    OTHER SURGICAL HISTORY  11/09/2019    Cardiac catheterization    OTHER SURGICAL HISTORY  11/09/2019    Tonsillectomy       FAMILY HISTORY  Family History   Problem Relation Name Age of Onset    Other (cva) Mother      Coronary artery disease Father      Coronary artery disease Brother      Other (malignant neoplasm) Brother      Stomach cancer Brother         SOCIAL HISTORY   reports that she has never smoked. She has never used smokeless tobacco. She reports that she does not drink alcohol and does not use drugs.    REVIEW OF SYSTEMS  Review of Systems   Respiratory:  Negative for shortness of breath.    Cardiovascular:  Negative for chest pain.   All other systems reviewed and are negative.    A 10+ point review of systems was otherwise negative except as noted and per HPI.    ALLERGIES  Allergies   Allergen Reactions    Penicillins Anaphylaxis and Hives    Caffeine Other and Unknown    Ciprofloxacin Diarrhea    Donepezil Unknown    Fexofenadine Unknown    Lanolin Unknown     burning    Lipase-Protease-Amylase Unknown     Pt states she can't remember, but that she knows she had a reaction.    Methylprednisolone  Unknown    Mirabegron Unknown    Naproxen Unknown    Norfloxacin Other    Other Other     Pain in stomach,difficulty urinating,headache    Rivastigmine Unknown    Sulfamethoxazole-Trimethoprim Diarrhea and Hives    Clindamycin Itching and Rash    Nitrofurantoin Rash    Ofloxacin Rash    Quinolones Diarrhea, Hives and Rash    Sulfamethoxazole Rash    Trimethoprim Rash       MEDICATIONS  Current Outpatient Medications   Medication Instructions    cefdinir (OMNICEF) 300 mg, oral, Daily    cholecalciferol (VITAMIN D-3) 25 mcg, oral, Daily    levothyroxine (SYNTHROID, LEVOXYL) 75 mcg, oral, Daily    metoprolol succinate XL (TOPROL-XL) 25 mg, oral, Daily    metroNIDAZOLE (FLAGYL) 500 mg, oral, 3 times daily    multivitamin (Daily Multi-Vitamin) tablet 1 tablet, oral, Daily    pantoprazole (PROTONIX) 20 mg, oral, Daily    rosuvastatin (CRESTOR) 5 mg, oral, Nightly       VITALS  Vitals:    01/11/24 1408   Pulse: 74      Body mass index is 28.17 kg/m².    PHYSICAL EXAM  CONSTITUTIONAL: NAD, appears stated age  EYES: anicteric sclera, sclera clear  HEAD: normocephalic, atraumatic   NECK: supple   PULMONARY: CTAB  CARDIOVASCULAR: RRR, no M/R/G appreciated   ABDOMEN: soft, mild lower abdominal TTP, +BS, no rebound or guarding   MUSCULOSKELETAL: no edema  SKIN: no jaundice   PSYCHIATRIC: AOx3, appropriate insight and judgement    LABS  WBC   Date Value   12/13/2023 7.6 x10*3/uL   01/13/2023 6.6 x10E9/L   08/29/2022 6.7 x10E9/L   05/25/2022 9.0 x10E9/L     Hemoglobin (g/dL)   Date Value   12/13/2023 15.3   01/13/2023 14.6   08/29/2022 14.3   05/25/2022 13.9     Platelets   Date Value   12/13/2023 222 x10*3/uL   01/13/2023 199 x10E9/L   08/29/2022 205 x10E9/L   05/25/2022 346 x10E9/L     Sodium (mmol/L)   Date Value   12/13/2023 139   01/13/2023 140   08/29/2022 142   06/14/2022 138     Potassium (mmol/L)   Date Value   12/13/2023 4.3   01/13/2023 4.4   08/29/2022 4.3   06/14/2022 3.8     Chloride (mmol/L)   Date Value    12/13/2023 104   01/13/2023 103   08/29/2022 105   06/14/2022 103     Bicarbonate (mmol/L)   Date Value   12/13/2023 29   01/13/2023 29   08/29/2022 30   06/14/2022 28     Urea Nitrogen (mg/dL)   Date Value   12/13/2023 22   01/13/2023 22   08/29/2022 22   06/14/2022 11     Creatinine (mg/dL)   Date Value   12/13/2023 0.97   01/13/2023 0.89   08/29/2022 0.84   06/14/2022 0.71     Calcium (mg/dL)   Date Value   12/13/2023 9.1   01/13/2023 9.8   08/29/2022 9.7   06/14/2022 9.5     Total Protein (g/dL)   Date Value   12/13/2023 6.3 (L)   01/13/2023 6.9   08/29/2022 6.8   05/25/2022 6.6     Bilirubin, Total (mg/dL)   Date Value   12/13/2023 1.1     Total Bilirubin (mg/dL)   Date Value   01/13/2023 1.0   08/29/2022 1.0   05/25/2022 0.5     Alkaline Phosphatase (U/L)   Date Value   12/13/2023 63   01/13/2023 64   08/29/2022 57   05/25/2022 72     ALT (U/L)   Date Value   12/13/2023 11     ALT (SGPT) (U/L)   Date Value   01/13/2023 10   08/29/2022 11   05/25/2022 13     AST (U/L)   Date Value   12/13/2023 20   01/13/2023 18   08/29/2022 19   05/25/2022 19     Glucose (mg/dL)   Date Value   12/13/2023 86   01/13/2023 88   08/29/2022 97   06/14/2022 108 (H)     Lipase (U/L)   Date Value   01/13/2023 5 (L)   06/14/2022 112 (H)   05/25/2022 289 (H)       ASSESSMENT/PLAN  Consuelo Andres is a 89 y.o. female with a past medical history of KATHRYN, CAD, hypothyroidism, HTN, and HLD following for history of recurrent diverticulitis. CT pancreas protocol showed resolved pancreatitis and unchanged sub-centimeter splenic lesion (1/2023). Stool pancreatic elastase (9/2022). CT A/P w/ contrast showed acute sigmoid diverticulitis and small hiatal hernia (1/2024). She has been following with Dr. Eugene Burns at [facility] recently for pancreatic lesion now resolved after episode of acute idiopathic pancreatitis and he recommended referral to Colorectal Surgery regarding recurrent episodes of diverticulitis.    She has acute diverticulitis and  likely some antibiotics associated diarrhea. She will need to complete her antibiotics as prescribed and then follow-up in 6-8 weeks. She likely plans to do this at [facility] where she has a referral to Colorectal Surgery from her GI provider there. We will check C. Diff to ensure this is not a cause of the diarrhea. If symptoms worsen, then she will need to return to the ED.    -Complete course of Cefdinir and Metronidazole as prescribed  -Advise low residue diet until improved  -Recommend to patient to go to ED if worsening symptoms such as bleeding, diarrhea, or abdominal pain    Follow-up in the office in 6 weeks either here or at [facility].    Signature: Dar Colin MD

## 2024-01-12 ENCOUNTER — TELEPHONE (OUTPATIENT)
Dept: PRIMARY CARE | Facility: CLINIC | Age: 89
End: 2024-01-12

## 2024-01-12 ENCOUNTER — LAB (OUTPATIENT)
Dept: LAB | Facility: LAB | Age: 89
End: 2024-01-12
Payer: MEDICARE

## 2024-01-12 ENCOUNTER — APPOINTMENT (OUTPATIENT)
Dept: GASTROENTEROLOGY | Facility: CLINIC | Age: 89
End: 2024-01-12
Payer: MEDICARE

## 2024-01-12 DIAGNOSIS — R10.9 ABDOMINAL PAIN, UNSPECIFIED ABDOMINAL LOCATION: ICD-10-CM

## 2024-01-12 DIAGNOSIS — R19.7 DIARRHEA, UNSPECIFIED TYPE: ICD-10-CM

## 2024-01-12 LAB — C DIF TOX TCDA+TCDB STL QL NAA+PROBE: NOT DETECTED

## 2024-01-12 PROCEDURE — 87493 C DIFF AMPLIFIED PROBE: CPT

## 2024-01-12 NOTE — TELEPHONE ENCOUNTER
Please call daughter Laura back. Pt's daughter (on our HIPAA) called and is wondering how they will know when patient's flare up of diverticulitis has resolved? She is also wondering if patient can still get a colonoscopy due to patient's age.     Daughter is also concerned about patient's memory as patient is forgetting to do things and forgetting names. Patient is making incorrect appointments by accident as well. Daughter is concerned that patient will not actually go to the ER like patient told me that patient would a few minutes ago. Please advise.

## 2024-01-12 NOTE — TELEPHONE ENCOUNTER
Pt is wondering if she has high cholesterol and if she needs to be taking a cholesterol medication? Pt will be doing a stool sample and will go to the ER per your last message.

## 2024-01-12 NOTE — TELEPHONE ENCOUNTER
Patient called back, notified of message, patient expressed verbal understanding had no questions at this time. Patient was agreeable to going to the ER.

## 2024-01-16 ENCOUNTER — APPOINTMENT (OUTPATIENT)
Dept: RADIOLOGY | Facility: CLINIC | Age: 89
End: 2024-01-16
Payer: MEDICARE

## 2024-01-18 ENCOUNTER — APPOINTMENT (OUTPATIENT)
Dept: SLEEP MEDICINE | Facility: CLINIC | Age: 89
End: 2024-01-18
Payer: MEDICARE

## 2024-01-23 ENCOUNTER — OFFICE VISIT (OUTPATIENT)
Dept: UROLOGY | Facility: CLINIC | Age: 89
End: 2024-01-23
Payer: MEDICARE

## 2024-01-23 DIAGNOSIS — R35.0 INCREASED FREQUENCY OF URINATION: ICD-10-CM

## 2024-01-23 DIAGNOSIS — N32.81 OAB (OVERACTIVE BLADDER): ICD-10-CM

## 2024-01-23 DIAGNOSIS — R30.0 DYSURIA: Primary | ICD-10-CM

## 2024-01-23 PROCEDURE — 99214 OFFICE O/P EST MOD 30 MIN: CPT | Performed by: STUDENT IN AN ORGANIZED HEALTH CARE EDUCATION/TRAINING PROGRAM

## 2024-01-23 PROCEDURE — 1126F AMNT PAIN NOTED NONE PRSNT: CPT | Performed by: STUDENT IN AN ORGANIZED HEALTH CARE EDUCATION/TRAINING PROGRAM

## 2024-01-23 PROCEDURE — 1036F TOBACCO NON-USER: CPT | Performed by: STUDENT IN AN ORGANIZED HEALTH CARE EDUCATION/TRAINING PROGRAM

## 2024-01-23 PROCEDURE — 1160F RVW MEDS BY RX/DR IN RCRD: CPT | Performed by: STUDENT IN AN ORGANIZED HEALTH CARE EDUCATION/TRAINING PROGRAM

## 2024-01-29 ENCOUNTER — APPOINTMENT (OUTPATIENT)
Dept: SURGERY | Facility: CLINIC | Age: 89
End: 2024-01-29
Payer: MEDICARE

## 2024-01-30 DIAGNOSIS — E03.9 HYPOTHYROIDISM, UNSPECIFIED TYPE: ICD-10-CM

## 2024-01-31 RX ORDER — LEVOTHYROXINE SODIUM 75 UG/1
75 TABLET ORAL DAILY
Qty: 90 TABLET | Refills: 1 | Status: SHIPPED | OUTPATIENT
Start: 2024-01-31

## 2024-02-01 ENCOUNTER — OFFICE VISIT (OUTPATIENT)
Dept: SURGERY | Facility: CLINIC | Age: 89
End: 2024-02-01
Payer: MEDICARE

## 2024-02-01 VITALS
HEART RATE: 83 BPM | HEIGHT: 62 IN | SYSTOLIC BLOOD PRESSURE: 120 MMHG | OXYGEN SATURATION: 97 % | DIASTOLIC BLOOD PRESSURE: 76 MMHG | BODY MASS INDEX: 28.97 KG/M2 | WEIGHT: 157.4 LBS

## 2024-02-01 DIAGNOSIS — E04.1 THYROID NODULE: Primary | ICD-10-CM

## 2024-02-01 PROCEDURE — 1036F TOBACCO NON-USER: CPT | Performed by: SURGERY

## 2024-02-01 PROCEDURE — 3074F SYST BP LT 130 MM HG: CPT | Performed by: SURGERY

## 2024-02-01 PROCEDURE — 1159F MED LIST DOCD IN RCRD: CPT | Performed by: SURGERY

## 2024-02-01 PROCEDURE — 3078F DIAST BP <80 MM HG: CPT | Performed by: SURGERY

## 2024-02-01 PROCEDURE — 99213 OFFICE O/P EST LOW 20 MIN: CPT | Performed by: SURGERY

## 2024-02-01 PROCEDURE — 1126F AMNT PAIN NOTED NONE PRSNT: CPT | Performed by: SURGERY

## 2024-02-01 NOTE — PROGRESS NOTES
"Subjective   Patient ID: Consuelo Andres is a 89 y.o. female who presents for New Patient Visit (Patient was referred by Dr. Yadav for thyroid. ).      Chief Complaint   Patient presents with    New Patient Visit     Patient was referred by Dr. Yadav for thyroid.        History of Presenting Illness:  89F with hypothyroidism \"for many years\" presents for evaluation of a R sided thyroid nodule.  She reports that her voice is normal, without hoarseness or changes in volume or pitch.  She does report some intermittent choking with food requiring some water \"to get it down\", but denies breathing difficulty and heaviness in the neck region on lying supine.  She also denies any family history of thyroid cancer and any undue personal history of radiation exposure.  She states that she recently had a bout of diverticulitis and is due to undergo a colonoscopy in April.  She had also been hospitalised in 2022 for pancreatitis and reports having had problems with her memory, thought to be secondary to narcotic use, since then.      Review of Systems:  Constitutional: Denies changes in weight, fatigue, night-sweats  HEENT: No changes in vision, nasal drainage, headache  CV: No palpitations, no chest pain, no lower extremity oedema  Resp: No wheezing, no cough, no shortness of breath  GI: No nausea, vomiting, diarrhoea, +constipation  : No dysuria, +increased frequency  Neuro: No weakness, +confusion, no numbness, dizziness  MSK: No weakness, arthralgias, myalgias  Haem: No easy bruising, no easy bleeding  Skin: No new lesions or rashes  Endocrine: No polydipsia, +polyuria, no heat/cold insensitivity    Past Medical History:   Diagnosis Date    Anesthesia of skin     Numbness and tingling in right hand    Personal history of diseases of the blood and blood-forming organs and certain disorders involving the immune mechanism     History of anemia    Personal history of other medical treatment 05/06/2021    History " of screening mammography    Unspecified cataract     Cataracts, bilateral       Past Surgical History:   Procedure Laterality Date    BLADDER SURGERY  09/07/2017    Bladder Surgery    BREAST BIOPSY  09/07/2017    Biopsy Breast Open    CARDIAC SURGERY  09/07/2017    Heart Surgery    FOOT SURGERY  09/07/2017    Foot Surgery    HYSTERECTOMY  09/07/2017    Hysterectomy    OTHER SURGICAL HISTORY  09/05/2022    Uvulopalatopharyngoplasty    OTHER SURGICAL HISTORY  03/06/2020    Surgery    OTHER SURGICAL HISTORY  11/09/2019    Cardiac catheterization    OTHER SURGICAL HISTORY  11/09/2019    Tonsillectomy       Current Outpatient Medications on File Prior to Visit   Medication Sig Dispense Refill    cholecalciferol (Vitamin D-3) 25 MCG (1000 UT) tablet Take 1 tablet (25 mcg) by mouth once daily. 90 tablet 1    levothyroxine (Synthroid, Levoxyl) 75 mcg tablet TAKE 1 TABLET BY MOUTH EVERY DAY 90 tablet 1    metoprolol succinate XL (Toprol-XL) 25 mg 24 hr tablet Take 1 tablet (25 mg) by mouth once daily. 90 tablet 3    multivitamin (Daily Multi-Vitamin) tablet Take 1 tablet by mouth once daily.      pantoprazole (ProtoNix) 20 mg EC tablet TAKE ONE TABLET BY MOUTH DAILY 90 tablet 0    rosuvastatin (Crestor) 5 mg tablet Take 1 tablet (5 mg) by mouth once daily at bedtime.      cefdinir (Omnicef) 300 mg capsule Take 1 capsule (300 mg) by mouth once daily.      metroNIDAZOLE (Flagyl) 500 mg tablet Take 1 tablet (500 mg) by mouth 3 times a day.      [DISCONTINUED] levothyroxine (Synthroid, Levoxyl) 75 mcg tablet Take 1 tablet (75 mcg) by mouth once daily. 90 tablet 1     No current facility-administered medications on file prior to visit.        Allergies   Allergen Reactions    Penicillins Anaphylaxis and Hives    Caffeine Other and Unknown    Ciprofloxacin Diarrhea    Donepezil Unknown    Fexofenadine Unknown    Lanolin Unknown     burning    Lipase-Protease-Amylase Unknown     Pt states she can't remember, but that she knows she  "had a reaction.    Methylprednisolone Unknown    Mirabegron Unknown    Naproxen Unknown    Norfloxacin Other    Other Other     Pain in stomach,difficulty urinating,headache    Rivastigmine Unknown    Sulfamethoxazole-Trimethoprim Diarrhea and Hives    Clindamycin Itching and Rash    Nitrofurantoin Rash    Ofloxacin Rash    Quinolones Diarrhea, Hives and Rash    Sulfamethoxazole Rash    Trimethoprim Rash       Social History     Socioeconomic History    Marital status:      Spouse name: Not on file    Number of children: Not on file    Years of education: Not on file    Highest education level: Not on file   Occupational History    Not on file   Tobacco Use    Smoking status: Never    Smokeless tobacco: Never   Vaping Use    Vaping Use: Never used   Substance and Sexual Activity    Alcohol use: Never    Drug use: Never    Sexual activity: Not on file   Other Topics Concern    Not on file   Social History Narrative    Not on file     Social Determinants of Health     Financial Resource Strain: Not on file   Food Insecurity: No Food Insecurity (6/16/2023)    Hunger Vital Sign     Worried About Running Out of Food in the Last Year: Never true     Ran Out of Food in the Last Year: Never true   Transportation Needs: Not on file   Physical Activity: Not on file   Stress: Not on file   Social Connections: Not on file   Intimate Partner Violence: Not on file   Housing Stability: Not on file       Family History   Problem Relation Name Age of Onset    Other (cva) Mother      Coronary artery disease Father      Coronary artery disease Brother      Other (malignant neoplasm) Brother      Stomach cancer Brother       Objective   /76   Pulse 83   Ht 1.575 m (5' 2\")   Wt 71.4 kg (157 lb 6.4 oz)   SpO2 97%   BMI 28.79 kg/m²     Physical Exam  Vitals reviewed.   Neck:      Comments: Thyroid gland is palpable and moves with deglutition  Cardiovascular:      Rate and Rhythm: Normal rate.   Pulmonary:      Effort: " Pulmonary effort is normal.   Musculoskeletal:      Cervical back: Normal range of motion and neck supple. No rigidity or tenderness.   Lymphadenopathy:      Cervical: No cervical adenopathy.   Skin:     General: Skin is warm.      Capillary Refill: Capillary refill takes less than 2 seconds.   Neurological:      General: No focal deficit present.      Mental Status: She is alert.   Psychiatric:         Mood and Affect: Mood normal.       Lab Results   Component Value Date    TSH 4.42 (H) 12/13/2023     US thyroid 11/09/2023    Narrative  Interpreted By:  Miguelito Casey,  STUDY:  US THYROID;  11/9/2023 8:27 am    INDICATION:  Signs/Symptoms:thyroid mass.    COMPARISON:  Thyroid ultrasound 04/22/2019    ACCESSION NUMBER(S):  [id number]    ORDERING CLINICIAN:  RAINE BORJAS    TECHNIQUE:  Multiple ultrasonographic images of the thyroid gland and surrounding  tissues were obtained.    FINDINGS:  PARENCHYMA:    SIZE:  RIGHT LOBE:  3.9 x 1.4 x 1.8 cm; homogeneous echotexture with normal vascularity.  LEFT LOBE:  3 x 0.6 x 0.9 cm; homogeneous echotexture with normal vascularity.  ISTHMUS:  0.1 cm    NODULES: (Please note, assessment and description of nodules is per  TI-RADS criteria. Up to 4 total nodules described, which includes  largest and/or most clinically significant based on morphology.) It  is noted that some spongiform and/or cystic nodules may not be  specifically described and are TR category 1 (benign).    NODULE #: 1.    Location: Right interpolar region  Size: 25 x 11 x 17 mm, previously 21 x 12 x 12 mm  Composition:  Solid or almost completely solid (2)  Echogenicity:  Hyperechoic or isoechoic (1)  Shape:  Wider-than-tall (0)  Margin:  Smooth (0)  Echogenic Foci:  None or Large comet-tail artifacts (0)    If present previously:  Significant change in size (>/= 20% diameter increase in at least  two dimensions and minimal increase of 2mm?): No significant change.  Change in features or ACR  TI-RADS category: No change.    The total score of this nodule is 3 points, corresponding to a  TI-RADS category  3; (3 points) Mildly suspicious.    CERVICAL LYMPH NODES:  No enlarged or morphologically abnormal cervical nodes are seen.    Impression  1. Mild interval increase in size of a 25 mm right TIRADS 3 nodule,  since 2019. Please see below for recommendations.    Please note that these statements are based on the recommendations of  the American College of Radiology    TI-RADS grading system. ACR TI-RADS recommendations (apply to nodules  which have NOT been biopsied):    TR5 (7 or more points) highly suspicious - FNA if ? 1cm, follow-up if  0.5 -0.9 cm every year for 5 years. Aggregate cancer risk 35%. TR4  (4-6 points) moderately suspicious - FNA if ? 1.5cm, follow-up if 1  -1.4 cm in 1, 2, 3 and 5 years. Aggregate cancer risk 9.1% TR3 (3  points) mildly suspicious - FNA if ? 2.5cm, follow-up if 1.5 -2.4 cm  in 1, 3 and 5 years. Aggregate cancer risk 4.8% TR2 (2 points) not  suspicious. No FNA or follow-up. Aggregate cancer risk 1.5% TR1 (0  points) benign - No FNA or follow-up. Aggregate cancer risk 0.3%    Signed by: Miguelito Casey 11/10/2023 10:31 AM  Dictation workstation:   [id number]      Assessment/Plan   89F with hypothyroidism, recent increased dose of levothyroxine as per , with R sided thyroid nodule, otherwise asymptomatic  - will schedule for FNA biopsy   - check additional thyroid function tests  Problem List Items Addressed This Visit    None  Visit Diagnoses         Codes    Thyroid nodule    -  Primary E04.1    Relevant Orders    Parathyroid Hormone, Intact    Thyroglobulin and Antithyroglobulin    Thyroid Peroxidase (TPO) Antibody    Thyroid Stimulating Hormone    Thyroid Stimulating Immunoglobulin    Thyrotropin Receptor Antibody    Thyroxine, Free    Triiodothyronine, Free                 Shahzad Vilchis MD 02/01/24 2:02 PM

## 2024-02-05 ENCOUNTER — LAB (OUTPATIENT)
Dept: LAB | Facility: LAB | Age: 89
End: 2024-02-05
Payer: MEDICARE

## 2024-02-05 ENCOUNTER — OFFICE VISIT (OUTPATIENT)
Dept: PRIMARY CARE | Facility: CLINIC | Age: 89
End: 2024-02-05
Payer: MEDICARE

## 2024-02-05 VITALS
HEIGHT: 62 IN | SYSTOLIC BLOOD PRESSURE: 125 MMHG | DIASTOLIC BLOOD PRESSURE: 74 MMHG | HEART RATE: 65 BPM | WEIGHT: 154 LBS | OXYGEN SATURATION: 99 % | RESPIRATION RATE: 16 BRPM | BODY MASS INDEX: 28.34 KG/M2 | TEMPERATURE: 97 F

## 2024-02-05 DIAGNOSIS — E04.1 THYROID NODULE: ICD-10-CM

## 2024-02-05 DIAGNOSIS — K57.30 DIVERTICULOSIS OF LARGE INTESTINE WITHOUT HEMORRHAGE: ICD-10-CM

## 2024-02-05 DIAGNOSIS — E55.9 VITAMIN D DEFICIENCY: ICD-10-CM

## 2024-02-05 DIAGNOSIS — M06.372: ICD-10-CM

## 2024-02-05 DIAGNOSIS — E78.49 OTHER HYPERLIPIDEMIA: Primary | ICD-10-CM

## 2024-02-05 DIAGNOSIS — M81.0 POSTMENOPAUSAL BONE LOSS: ICD-10-CM

## 2024-02-05 DIAGNOSIS — N18.31 STAGE 3A CHRONIC KIDNEY DISEASE (MULTI): ICD-10-CM

## 2024-02-05 DIAGNOSIS — E03.9 ACQUIRED HYPOTHYROIDISM: ICD-10-CM

## 2024-02-05 DIAGNOSIS — R41.3 MEMORY LOSS: ICD-10-CM

## 2024-02-05 DIAGNOSIS — N31.9 NEUROGENIC BLADDER: ICD-10-CM

## 2024-02-05 DIAGNOSIS — I10 PRIMARY HYPERTENSION: ICD-10-CM

## 2024-02-05 LAB
PTH-INTACT SERPL-MCNC: 66.8 PG/ML (ref 18.5–88)
T3FREE SERPL-MCNC: 3.2 PG/ML (ref 2.3–4.2)
T4 FREE SERPL-MCNC: <0.2 NG/DL (ref 0.78–1.48)
THYROPEROXIDASE AB SERPL-ACNC: 40 IU/ML
TSH SERPL-ACNC: 2.37 MIU/L (ref 0.44–3.98)

## 2024-02-05 PROCEDURE — 84481 FREE ASSAY (FT-3): CPT

## 2024-02-05 PROCEDURE — 36415 COLL VENOUS BLD VENIPUNCTURE: CPT

## 2024-02-05 PROCEDURE — 99215 OFFICE O/P EST HI 40 MIN: CPT | Performed by: INTERNAL MEDICINE

## 2024-02-05 PROCEDURE — 83519 RIA NONANTIBODY: CPT

## 2024-02-05 PROCEDURE — 84445 ASSAY OF TSI GLOBULIN: CPT

## 2024-02-05 PROCEDURE — 84443 ASSAY THYROID STIM HORMONE: CPT

## 2024-02-05 PROCEDURE — 3074F SYST BP LT 130 MM HG: CPT | Performed by: INTERNAL MEDICINE

## 2024-02-05 PROCEDURE — 86376 MICROSOMAL ANTIBODY EACH: CPT

## 2024-02-05 PROCEDURE — 86800 THYROGLOBULIN ANTIBODY: CPT

## 2024-02-05 PROCEDURE — 84439 ASSAY OF FREE THYROXINE: CPT

## 2024-02-05 PROCEDURE — 1159F MED LIST DOCD IN RCRD: CPT | Performed by: INTERNAL MEDICINE

## 2024-02-05 PROCEDURE — 1036F TOBACCO NON-USER: CPT | Performed by: INTERNAL MEDICINE

## 2024-02-05 PROCEDURE — 83970 ASSAY OF PARATHORMONE: CPT

## 2024-02-05 PROCEDURE — 84432 ASSAY OF THYROGLOBULIN: CPT

## 2024-02-05 PROCEDURE — 1126F AMNT PAIN NOTED NONE PRSNT: CPT | Performed by: INTERNAL MEDICINE

## 2024-02-05 PROCEDURE — 3078F DIAST BP <80 MM HG: CPT | Performed by: INTERNAL MEDICINE

## 2024-02-05 RX ORDER — CHOLECALCIFEROL (VITAMIN D3) 25 MCG
1000 TABLET ORAL DAILY
Qty: 90 TABLET | Refills: 1 | Status: SHIPPED | OUTPATIENT
Start: 2024-02-05

## 2024-02-05 SDOH — ECONOMIC STABILITY: FOOD INSECURITY: WITHIN THE PAST 12 MONTHS, THE FOOD YOU BOUGHT JUST DIDN'T LAST AND YOU DIDN'T HAVE MONEY TO GET MORE.: NEVER TRUE

## 2024-02-05 SDOH — ECONOMIC STABILITY: FOOD INSECURITY: WITHIN THE PAST 12 MONTHS, YOU WORRIED THAT YOUR FOOD WOULD RUN OUT BEFORE YOU GOT MONEY TO BUY MORE.: NEVER TRUE

## 2024-02-05 ASSESSMENT — PATIENT HEALTH QUESTIONNAIRE - PHQ9
1. LITTLE INTEREST OR PLEASURE IN DOING THINGS: NOT AT ALL
2. FEELING DOWN, DEPRESSED OR HOPELESS: NOT AT ALL
SUM OF ALL RESPONSES TO PHQ9 QUESTIONS 1 & 2: 0

## 2024-02-05 ASSESSMENT — LIFESTYLE VARIABLES
HOW OFTEN DO YOU HAVE A DRINK CONTAINING ALCOHOL: NEVER
AUDIT-C TOTAL SCORE: 0
SKIP TO QUESTIONS 9-10: 1
HOW OFTEN DO YOU HAVE SIX OR MORE DRINKS ON ONE OCCASION: NEVER
HOW MANY STANDARD DRINKS CONTAINING ALCOHOL DO YOU HAVE ON A TYPICAL DAY: PATIENT DOES NOT DRINK

## 2024-02-05 NOTE — PROGRESS NOTES
"Chief Complaint/HPI:    Follow up :    Patient was seen at [facility] ER with complaints of abdominal pain. Patient was diagnosed with diverticulitis.  Patient apparently had a CT completed per [facility] also per her gastroenterologist. She was treated with Omnicef and metronidazole. Patient states that abdomen feels OK now, patient sees Dr Burns, she is to have colonoscopy completed.      urinary frequency/incontinence: H/o uterine prolapse. The patient states that she can go from seated to standing and notices that she leaks some urine. Urinates frequently at night, she no longer takes trospium, she has not seen Dr Ray (urogynecology) for some time. Patient will follow up with urogynecology, she is scheduled to see urogynecology later this month.     memory issues: memory issues have not changed now, she is stable.      right hand numbness: patient only notes numbness in the hand when she wakes up in the morning. She also has thenar numbness as well. She has no complaints at this time.     HTN/MV prolapse: Currently on metoprolol.      patient loses balance easily, this has been going on for years. Patient gets intermittent double vision, the symptoms come and go after closing and opening eyes several times. Patient has not fallen recently, \"I do not take any chances\"     hyperlipidemia: patient no longer takes statin therapy, it caused leg cramps, she no longer takes it.      hypothyroidism: takes levothyroxine 75 mcg  daily. Patient will follow up with Dr Vilchis     Recent thyroid ultrasound with multiple nodules appreciated. Right sided 25 mm TIRADS 3 nodule that appears to have increased in size since 2019 US. Patient has seen endocrine surgery for an assessment already. Patient is to have surgery next week, she states.      KATHRYN: patient states that she had sleep study completed at [facility]. She does not wear a CPAP. She has an appointment scheduled with sleep medicine    ROS otherwise negative aside from what " was mentioned above in HPI.      Patient Active Problem List   Diagnosis    Abdominal pain, LLQ (left lower quadrant)    Abnormal CT scan, colon    Abrasion    Accidental fall    Acute diverticulitis    Acute generalized abdominal pain    Acute maxillary sinusitis    Acute nasopharyngitis    Acute pancreatitis    Acute UTI    Adhesive capsulitis of shoulder    Allergic contact dermatitis due to adhesives    Anemia    Anxiety    Arthritis    Bilateral impacted cerumen    Carpal tunnel syndrome of right wrist    Cataract, left    Clindamycin adverse reaction    Contact dermatitis due to plant    Diaphragmatic hernia    Diarrhea, unspecified    Disorder of bone and cartilage    Disorder of skin or subcutaneous tissue    Diverticulosis of large intestine    Dysuria    Ecchymosis    Fecal incontinence    Flatulence, eructation, and gas pain    GERD without esophagitis    Hair loss    Hematuria    Hyperlipidemia    Hypertension    Hypothyroidism    Incomplete bladder emptying    Increased frequency of urination    Laceration of right hand without foreign body    Left knee pain    Lesion of spleen    Memory loss    Midline cystocele    Mitral valve disease    Limb cramp    Myalgia    Neck pain    Atypical chest pain    Nocturia    Nontoxic single thyroid nodule    Complex sleep apnea syndrome    Osteoarthritis of knee    Pain in both feet    Pain in joint involving pelvic region and thigh    Pain of right lower extremity    Poison ivy    Postmenopausal bone loss    Primary dysthymia    Rectocele, female    Restless legs syndrome    Right hand paresthesia    Antibiotic-induced allergic rash    Skin sensation disturbance    Swallowing disorder    Swelling of left lower extremity    Unsteady gait    URI (upper respiratory infection)    Vision changes    Vitamin D deficiency    Watery stools    Dermatitis    Unknown cause of injury    Colloid thyroid nodule    Acute sinusitis    Acute viral syndrome    Contact dermatitis,  allergic    Rheumatoid nodule, left ankle and foot (CMS/HCC)    Facial rash    Skin lesions    Cystitis    Nipple pain    Other chest pain    Impacted cerumen of right ear    Cortical cataract of right eye    Diverticulosis    Gastroesophageal reflux disease    Muscle cramps    Muscle weakness of lower extremity    Nocturnal leg cramps    Mitral valve prolapse    Actinic keratosis    Carcinoma in situ of skin    Inflamed seborrheic keratosis    Neurogenic bladder    Photoaged skin    Urethral stricture    Functional disorder of bladder    Breast tenderness    Dry eye syndrome, bilateral    Monocular diplopia of left eye    Dehydration    History of anemia    Injury of free lower extremity    Routine general medical examination at health care facility    Stage 3a chronic kidney disease (CMS/HCC)         Past Medical History:   Diagnosis Date    Anesthesia of skin     Numbness and tingling in right hand    Diverticulitis 01/13/2024    Personal history of diseases of the blood and blood-forming organs and certain disorders involving the immune mechanism     History of anemia    Personal history of other medical treatment 05/06/2021    History of screening mammography    Unspecified cataract     Cataracts, bilateral     Past Surgical History:   Procedure Laterality Date    BLADDER SURGERY  09/07/2017    Bladder Surgery    BREAST BIOPSY  09/07/2017    Biopsy Breast Open    CARDIAC SURGERY  09/07/2017    Heart Surgery    FOOT SURGERY  09/07/2017    Foot Surgery    HYSTERECTOMY  09/07/2017    Hysterectomy    OTHER SURGICAL HISTORY  09/05/2022    Uvulopalatopharyngoplasty    OTHER SURGICAL HISTORY  03/06/2020    Surgery    OTHER SURGICAL HISTORY  11/09/2019    Cardiac catheterization    OTHER SURGICAL HISTORY  11/09/2019    Tonsillectomy     Social History     Social History Narrative    Not on file         ALLERGIES  Penicillins, Caffeine, Ciprofloxacin, Donepezil, Fexofenadine, Lanolin, Lipase-protease-amylase,  "Methen-m.blue-s.phos-phsal-hyo, Methylprednisolone, Mirabegron, Naproxen, Norfloxacin, Other, Rivastigmine, Sulfamethoxazole-trimethoprim, Clindamycin, Nitrofurantoin, Ofloxacin, Quinolones, Sulfamethoxazole, and Trimethoprim      MEDICATIONS  Current Outpatient Medications on File Prior to Visit   Medication Sig Dispense Refill    levothyroxine (Synthroid, Levoxyl) 75 mcg tablet TAKE 1 TABLET BY MOUTH EVERY DAY 90 tablet 1    metoprolol succinate XL (Toprol-XL) 25 mg 24 hr tablet Take 1 tablet (25 mg) by mouth once daily. 90 tablet 3    multivitamin (Daily Multi-Vitamin) tablet Take 1 tablet by mouth once daily.      pantoprazole (ProtoNix) 20 mg EC tablet TAKE ONE TABLET BY MOUTH DAILY 90 tablet 0    [DISCONTINUED] cholecalciferol (Vitamin D-3) 25 MCG (1000 UT) tablet Take 1 tablet (25 mcg) by mouth once daily. 90 tablet 1    [DISCONTINUED] cefdinir (Omnicef) 300 mg capsule Take 1 capsule (300 mg) by mouth once daily.      [DISCONTINUED] levothyroxine (Synthroid, Levoxyl) 75 mcg tablet Take 1 tablet (75 mcg) by mouth once daily. 90 tablet 1    [DISCONTINUED] metroNIDAZOLE (Flagyl) 500 mg tablet Take 1 tablet (500 mg) by mouth 3 times a day.      [DISCONTINUED] rosuvastatin (Crestor) 5 mg tablet Take 1 tablet (5 mg) by mouth once daily at bedtime.       No current facility-administered medications on file prior to visit.         PHYSICAL EXAM  /74 (BP Location: Right arm, Patient Position: Sitting, BP Cuff Size: Adult)   Pulse 65   Temp 36.1 °C (97 °F)   Resp 16   Ht 1.575 m (5' 2\")   Wt 69.9 kg (154 lb)   SpO2 99%   BMI 28.17 kg/m²   Body mass index is 28.17 kg/m².  CONSTITUTIONAL - well nourished, well developed, looks like stated age, in no acute distress, not ill-appearing, and not tired appearing, accompanied by son in law.   SKIN - normal skin color and pigmentation, normal skin turgor without rash, lesions, or nodules visualized on exposed skin  HEAD - no trauma, normocephalic  EYES - " extraocular muscles are intact, and normal external exam, wears glasses  ENT - auricles are intact  NECK - supple without rigidity, no neck mass was observed, right lobe of thyroid is palpable, it feels larger than the left lobe  CHEST - clear to auscultation, no wheezing, no crackles and no rales, good effort  CARDIAC - regular rate and regular rhythm, no skipped beats, no murmur, no carotid bruits noted  ABDOMEN - not examined today  EXTREMITIES - no edema, no deformities  NEUROLOGICAL - normal gait, normal balance, normal motor, no ataxia, DTRs equal and symmetrical; alert, oriented and no focal signs  PSYCHIATRIC - alert, pleasant and cordial, age-appropriate  IMMUNOLOGIC - no cervical lymphadenopathy        ASSESSMENT/PLAN  Problem List Items Addressed This Visit       Diverticulosis of large intestine    Current Assessment & Plan     Patient is to be scheduled for colonoscopy. She has no abdominal pain now         Relevant Orders    Referral to Nutrition Services    CBC and Auto Differential    Comprehensive Metabolic Panel    Hyperlipidemia - Primary    Current Assessment & Plan     Patient takes no meds, will not initiate meds now, these were discontinued already         Relevant Orders    Referral to Nutrition Services    CBC and Auto Differential    Comprehensive Metabolic Panel    Lipid Panel    Hypertension    Current Assessment & Plan     BP is controlled, no med changes         Relevant Orders    CBC and Auto Differential    Comprehensive Metabolic Panel    Albumin , Urine Random    Hypothyroidism    Current Assessment & Plan     TSH is increased, patient has FNA scheduled with endocrine surgery         Relevant Orders    CBC and Auto Differential    Comprehensive Metabolic Panel    TSH with reflex to Free T4 if abnormal    Memory loss    Relevant Orders    CBC and Auto Differential    Comprehensive Metabolic Panel    Postmenopausal bone loss    Relevant Orders    Vitamin D 25-Hydroxy,Total (for eval  of Vitamin D levels)    Vitamin D deficiency    Current Assessment & Plan     Continue vitamin D          Relevant Medications    cholecalciferol (Vitamin D-3) 25 MCG (1000 UT) tablet    Other Relevant Orders    CBC and Auto Differential    Comprehensive Metabolic Panel    Rheumatoid nodule, left ankle and foot (CMS/HCC)    Relevant Orders    CBC and Auto Differential    Comprehensive Metabolic Panel    Neurogenic bladder    Current Assessment & Plan     Patient sees urogynelcology         Relevant Orders    CBC and Auto Differential    Comprehensive Metabolic Panel    Stage 3a chronic kidney disease (CMS/HCC)    Relevant Orders    CBC and Auto Differential    Comprehensive Metabolic Panel     Continue to monitor labs, recheck labs in 3-4 months.  Nutrition consult ordered to review diet for diverticulosis    Gian Traylor MD

## 2024-02-07 ENCOUNTER — NUTRITION (OUTPATIENT)
Dept: NUTRITION | Facility: CLINIC | Age: 89
End: 2024-02-07
Payer: MEDICARE

## 2024-02-07 DIAGNOSIS — E78.49 OTHER HYPERLIPIDEMIA: ICD-10-CM

## 2024-02-07 DIAGNOSIS — N18.31 STAGE 3A CHRONIC KIDNEY DISEASE (CKD) (MULTI): ICD-10-CM

## 2024-02-07 DIAGNOSIS — K57.30 DIVERTICULOSIS OF LARGE INTESTINE WITHOUT HEMORRHAGE: ICD-10-CM

## 2024-02-07 DIAGNOSIS — K57.92 ACUTE DIVERTICULITIS: Primary | ICD-10-CM

## 2024-02-07 DIAGNOSIS — Z71.3 DIETARY COUNSELING AND SURVEILLANCE: ICD-10-CM

## 2024-02-07 LAB
BILL ONLY-THYROGLOBULIN: NORMAL
THYROGLOB AB SERPL-ACNC: <0.9 IU/ML (ref 0–4)
THYROGLOB SERPL-MCNC: 16.4 NG/ML (ref 1.3–31.8)
THYROGLOB SERPL-MCNC: NORMAL NG/ML (ref 1.3–31.8)
TSH RECEP AB SER-ACNC: <1.1 IU/L

## 2024-02-07 PROCEDURE — 97802 MEDICAL NUTRITION INDIV IN: CPT

## 2024-02-07 NOTE — PROGRESS NOTES
"NUTRITION ASSESSMENT NOTE    Reason for Nutrition Visit:  Pt is a 89 y.o. female being seen in person for an initial appointment at [facility] referred for HLD and diverticulosis.      Pt states they seek  from dietitian for help addressing her diverticulitis and high lipid levels through nutritional intervention.     Anthropometrics:  Wt: 154#, 69.9kg  Ht: 5'2\"  BMI: 28.17 kg/m2      Past Medical Hx:  Patient Active Problem List   Diagnosis    Abdominal pain, LLQ (left lower quadrant)    Abnormal CT scan, colon    Abrasion    Accidental fall    Acute diverticulitis    Acute generalized abdominal pain    Acute maxillary sinusitis    Acute nasopharyngitis    Acute pancreatitis    Acute UTI    Adhesive capsulitis of shoulder    Allergic contact dermatitis due to adhesives    Anemia    Anxiety    Arthritis    Bilateral impacted cerumen    Carpal tunnel syndrome of right wrist    Cataract, left    Clindamycin adverse reaction    Contact dermatitis due to plant    Diaphragmatic hernia    Diarrhea, unspecified    Disorder of bone and cartilage    Disorder of skin or subcutaneous tissue    Diverticulosis of large intestine    Dysuria    Ecchymosis    Fecal incontinence    Flatulence, eructation, and gas pain    GERD without esophagitis    Hair loss    Hematuria    Hyperlipidemia    Hypertension    Hypothyroidism    Incomplete bladder emptying    Increased frequency of urination    Laceration of right hand without foreign body    Left knee pain    Lesion of spleen    Memory loss    Midline cystocele    Mitral valve disease    Limb cramp    Myalgia    Neck pain    Atypical chest pain    Nocturia    Nontoxic single thyroid nodule    Complex sleep apnea syndrome    Osteoarthritis of knee    Pain in both feet    Pain in joint involving pelvic region and thigh    Pain of right lower extremity    Poison ivy    Postmenopausal bone loss    Primary dysthymia    Rectocele, female    Restless legs syndrome    Right hand " paresthesia    Antibiotic-induced allergic rash    Skin sensation disturbance    Swallowing disorder    Swelling of left lower extremity    Unsteady gait    URI (upper respiratory infection)    Vision changes    Vitamin D deficiency    Watery stools    Dermatitis    Unknown cause of injury    Colloid thyroid nodule    Acute sinusitis    Acute viral syndrome    Contact dermatitis, allergic    Rheumatoid nodule, left ankle and foot (CMS/HCC)    Facial rash    Skin lesions    Cystitis    Nipple pain    Other chest pain    Impacted cerumen of right ear    Cortical cataract of right eye    Diverticulosis    Gastroesophageal reflux disease    Muscle cramps    Muscle weakness of lower extremity    Nocturnal leg cramps    Mitral valve prolapse    Actinic keratosis    Carcinoma in situ of skin    Inflamed seborrheic keratosis    Neurogenic bladder    Photoaged skin    Urethral stricture    Functional disorder of bladder    Breast tenderness    Dry eye syndrome, bilateral    Monocular diplopia of left eye    Dehydration    History of anemia    Injury of free lower extremity    Routine general medical examination at health care facility    Stage 3a chronic kidney disease (CMS/[facility])          Lab Results   Component Value Date    CHOL 200 (H) 12/13/2023    LDLF 120 (H) 01/13/2023    TRIG 112 12/13/2023          Food and Nutrition Hx:  Despite being 89 yo, pt is beaming with both enthusiasm and wit during appointment. She lives on her own, and still actively gets around with great vigor. Despite the longevity, pt has serious recent issues with diverticulosis/itis. Pt has had bouts of diverticulitis and pancreatitis at the end of 2022 and into 2023 - it cleared up, pains reduced and problems subsided. Pt then developing diverticulitis a few months later, with sx and problems resuming. Pt went to ED to address the sx. Diverticulitis flare occurred again early January 2024, and pt is currently experiencing pain, nausea, and upset  "stomach - formerly diarrhea w/ blood until 2 weeks ago. Bowels still \"blackish,\" but solid in nature. Pt informed physicians at [facility] about the blood and coloring in her stool, and they stated it was \"normal.\" Pt disagrees with this assertion and is concerned.     Pt is mindful of seeds and skins to remove remnants of some fiber before and during eating. Pt is avoiding nuts since the pancreatitis - pt was told not to consume any more nuts or seeds during that time and it has stuck with the pt. Pt has also not had any beans, period, since diverticulitis, per guidance from a physician.     D/t the nature of her existing flare-up, RDN recommends low fiber dietary pattern until sx resolve. It was explained to pt that once sx do resolve, a slow and steady increase and return to regular fiber intake is recommended. RDN also working within the framework of pancreatitis nutrition therapy guidelines (despite acute, non-chronic nature, but lax in nature), CKD stage 3a MNT, and GI centeral MNT. Pt mentioned how relieved she was to have met with dietitian, and that she has tremendous peace of mind with how to go about managing her nutritional intake. RDN also confirmed that pt is able to consume nuts and beans/legumes once her diverticulitis flare-up is over with, but that she should start low and slow with increasing intake, and that she should soak her nuts in water for 24 hours to ensure they can be easily mashed by teeth and allow for easier GI transit.     Pt was also unaware that she was dx with CKD stage 3a, for which the RDN made recommendations to include consuming an ample amount of water day to day. Plant-based proteins were highlighted as a good source for CKD, but only after diverticulitis flare-up had subsided. Again, RDN reinforced slow transition to increased plant and fiber intake.       DIETARY RECALL: *Pt consumes an average of 3 meals/day*  Meal 1: Everyday, ~10am-11am -- Cream of Wheat, oatmeal, " "dried cereals ([employer]), banana, chocolate cookie (occasional), whey protein drink  Meal 2: Everyday, ~2pm-3pm -- Cheese stick w/ crackers, cheese (American) sandwich, peanut butter and honey sandwich, turkey and ham sanchez sandwich  Meal 3: Everyday, ~7pm -- TV dinners - macaroni and cheese, meatloaf; salmon, broccoli, baked potato w/ sour cream, white rice, Chinese food, fish sandwich, salad (multiple times per week) w/ carrots/lettuce/cabbage/onions  Snacks: Ice cream, mint candies  Beverages: Whey protein drink daily (mornings), Gatorade x 1 bottle daily, water (straight and flavored) x unknown volume -- pt states she drinks a lot of water -- Dr. Pepper (seldom), tea (no-caffeine)       NUTRITIONAL ARTIFACTS:  Does not prefer tomato or tomato based items    Allergies: None  Intolerance: None  Appetite: Poor -- Pt \"could care less about food\"   Intake: >75% -- Wt holding steady  Dentition : own -- Good condition, son is dentist    Supplements: Multivitamin and Soluble Fiber daily    Physical Feeling: Physically full    Energy Levels: Low    Food Preparation: Patient      Nutrition Focused Physical Exam:    Performed/Deferred: Performed    Muscle Wasting:  Temporal: None  Shoulder: None  None  Interosseous Muscle: None  Quadriceps: None    Loss of Subcutaneous Fat:  Eyes: None  Perioral: None  Triceps: None  Chest: None    Other Physical Findings:  Hair: None  None  Mouth: None  Skin: None  Nails: None  none    Malnutrition Present: No    Estimated Energy Needs:    WEIGHT MAINTENANCE: MSJ, 1085 x 1.2 (AF) = 1300 kcal/day  PROTEIN Needs (CKD Stage 3a guidelines): 0.6-0.8 g/kg = 40-55 g/day    Nutrition Diagnosis:    Diagnosis Statement 1:  Diagnosis Status: New  Diagnosis : Altered GI function  related to alteration in gastrointestinal tract structure and/or function as evidenced by  diverticulosis diagnosis, in current diverticulitis flare-up, with associated symptoms and manifestations    Diagnosis Statement " 2:  Diagnosis Status: New  Diagnosis : Food and nutrition related knowledge deficit related to lack of or limited prior nutrition-related education as evidenced by verbalizes inaccurate or incomplete knowledge    Diagnosis Statement 3:   Diagnosis Status: New  Diagnosis : Inadequate fiber intake  related to alteration in gastrointestinal tract structure and/or function as evidenced by  diverticulitis flare-up with associated symptoms and manifestations    Nutrition Interventions:  Anti-Inflammatory Diet, Consistent Carbohydrate Diet, Decreased Fat Diet, Decreased Insoluble Fiber Diet, Decreased Sodium Diet, Increased Fluid Intake, Increased Omega-3 Diet, Low Saturated Fat Diet, and Mediterranean Diet  Nutrition Counseling: Motivational Interviewing and Health Belief Model  Coordination of Care: None          Educational Handouts: ADA My Plate, PBP, WG A-Z, Protein Content of Foods List, Renal MNT, ACLM NB's, DGDD    Readiness to Change : Good  Level of Understanding : Good  Anticipated Compliant : Good

## 2024-02-08 LAB — TSI SER-ACNC: <1 TSI INDEX

## 2024-02-12 ENCOUNTER — PROCEDURE VISIT (OUTPATIENT)
Dept: SURGERY | Facility: CLINIC | Age: 89
End: 2024-02-12
Payer: MEDICARE

## 2024-02-12 VITALS
DIASTOLIC BLOOD PRESSURE: 70 MMHG | OXYGEN SATURATION: 94 % | BODY MASS INDEX: 28.74 KG/M2 | HEIGHT: 62 IN | SYSTOLIC BLOOD PRESSURE: 111 MMHG | WEIGHT: 156.2 LBS | HEART RATE: 73 BPM

## 2024-02-12 DIAGNOSIS — E04.1 THYROID NODULE: Primary | ICD-10-CM

## 2024-02-12 PROCEDURE — 99214 OFFICE O/P EST MOD 30 MIN: CPT | Performed by: SURGERY

## 2024-02-12 PROCEDURE — 88173 CYTOPATH EVAL FNA REPORT: CPT | Performed by: PATHOLOGY

## 2024-02-12 PROCEDURE — 10005 FNA BX W/US GDN 1ST LES: CPT | Performed by: SURGERY

## 2024-02-12 PROCEDURE — 88173 CYTOPATH EVAL FNA REPORT: CPT

## 2024-02-12 PROCEDURE — 88112 CYTOPATH CELL ENHANCE TECH: CPT

## 2024-02-12 NOTE — PROGRESS NOTES
"Subjective   Patient ID: Consuelo Andres is a 89 y.o. female who presents for Procedure (Patient is here for thyroid biopsy. ).    HPI   is here for an FNA biopsy of her RIGHT thyroid lobe nodule.  She denies any changes since her initial visit.      Review of Systems  Constitutional: Denies changes in weight, fatigue, night-sweats  HEENT: No changes in vision, nasal drainage, headache  CV: No palpitations, no chest pain, no lower extremity oedema  Resp: No wheezing, no cough, no shortness of breath  GI: No nausea, vomiting, diarrhoea, +constipation  : No dysuria, +increased frequency  Neuro: No weakness, +confusion, no numbness, dizziness  MSK: No weakness, arthralgias, myalgias  Haem: No easy bruising, no easy bleeding  Skin: No new lesions or rashes  Endocrine: No polydipsia, +polyuria, no heat/cold insensitivity    Objective   /70   Pulse 73   Ht 1.575 m (5' 2\")   Wt 70.9 kg (156 lb 3.2 oz)   SpO2 94%   BMI 28.57 kg/m²     Physical Exam  Neck:      Comments: Thyroid gland is palpable and moves with deglutition  Cardiovascular:      Rate and Rhythm: Normal rate.   Pulmonary:      Effort: Pulmonary effort is normal.   Musculoskeletal:      Cervical back: Normal range of motion and neck supple. No rigidity or tenderness.   Lymphadenopathy:      Cervical: No cervical adenopathy.   Skin:     General: Skin is warm.      Capillary Refill: Capillary refill takes less than 2 seconds.   Neurological:      General: No focal deficit present.      Mental Status: She is alert.   Psychiatric:         Mood and Affect: Mood normal.     US thyroid 11/09/2023    Narrative  Interpreted By:  Miguelito Casey,  STUDY:  US THYROID;  11/9/2023 8:27 am    INDICATION:  Signs/Symptoms:thyroid mass.    COMPARISON:  Thyroid ultrasound 04/22/2019    ACCESSION NUMBER(S):  [id number]    ORDERING CLINICIAN:  RAINE BORJAS    TECHNIQUE:  Multiple ultrasonographic images of the thyroid gland and surrounding  tissues " were obtained.    FINDINGS:  PARENCHYMA:    SIZE:  RIGHT LOBE:  3.9 x 1.4 x 1.8 cm; homogeneous echotexture with normal vascularity.  LEFT LOBE:  3 x 0.6 x 0.9 cm; homogeneous echotexture with normal vascularity.  ISTHMUS:  0.1 cm    NODULES: (Please note, assessment and description of nodules is per  TI-RADS criteria. Up to 4 total nodules described, which includes  largest and/or most clinically significant based on morphology.) It  is noted that some spongiform and/or cystic nodules may not be  specifically described and are TR category 1 (benign).    NODULE #: 1.    Location: Right interpolar region  Size: 25 x 11 x 17 mm, previously 21 x 12 x 12 mm  Composition:  Solid or almost completely solid (2)  Echogenicity:  Hyperechoic or isoechoic (1)  Shape:  Wider-than-tall (0)  Margin:  Smooth (0)  Echogenic Foci:  None or Large comet-tail artifacts (0)    If present previously:  Significant change in size (>/= 20% diameter increase in at least  two dimensions and minimal increase of 2mm?): No significant change.  Change in features or ACR TI-RADS category: No change.    The total score of this nodule is 3 points, corresponding to a  TI-RADS category  3; (3 points) Mildly suspicious.    CERVICAL LYMPH NODES:  No enlarged or morphologically abnormal cervical nodes are seen.    Impression  1. Mild interval increase in size of a 25 mm right TIRADS 3 nodule,  since 2019. Please see below for recommendations.    Please note that these statements are based on the recommendations of  the [employer] College [employer]    TI-RADS grading system. ACR TI-RADS recommendations (apply to nodules  which have NOT been biopsied):    TR5 (7 or more points) highly suspicious - FNA if ? 1cm, follow-up if  0.5 -0.9 cm every year for 5 years. Aggregate cancer risk 35%. TR4  (4-6 points) moderately suspicious - FNA if ? 1.5cm, follow-up if 1  -1.4 cm in 1, 2, 3 and 5 years. Aggregate cancer risk 9.1% TR3 (3  points) mildly suspicious -  "FNA if ? 2.5cm, follow-up if 1.5 -2.4 cm  in 1, 3 and 5 years. Aggregate cancer risk 4.8% TR2 (2 points) not  suspicious. No FNA or follow-up. Aggregate cancer risk 1.5% TR1 (0  points) benign - No FNA or follow-up. Aggregate cancer risk 0.3%    Signed by: Miguelito Casey 11/10/2023 10:31 AM  Dictation workstation:   [id number]    Fine needle aspiration biopsy of RIGHT midpole thyroid lobe nodule measuring 2.5 x 1.1 x 1.7cm (TR3)    Date/Time: 2/12/2024 11:38 AM    Performed by: Shahzad Vilchis MD  Authorized by: Shahzad Vilchis MD    Consent:     Consent obtained:  Verbal    Consent given by:  Patient    Risks discussed:  Bleeding, infection and pain    Alternatives discussed:  Observation  Universal protocol:     Procedure explained and questions answered to patient or proxy's satisfaction: yes      Relevant documents present and verified: yes      Test results available: yes      Imaging studies available: yes      Patient identity confirmed:  Verbally with patient  Indications:     Indications:  89F with asymptomatic thyroid nodule, recent increment in size  Pre-procedure details:     Procedure prep: Alcohol-soaked gauze.    Preparation: Patient was prepped and draped in the usual sterile fashion    Sedation:     Sedation type:  None  Anesthesia:     Anesthesia method:  Local infiltration    Local anesthetic:  Lidocaine 1% w/o epi  Procedure specific details:      The aspiration biopsy procedure is explained to the patient; risks and other diagnostic options are discussed.  The patient's records are reviewed with the patient and she understands the location of the nodule and the side of the thyroid gland to be biopsied.  Informed consent is obtained.    The skin is cleansed with alcohol. Using real-time ultrasound guidance, 3 passes using a 25g × 1 1/2\" needle are made. Three (3) air dried and three (3) alcohol-fixed smears are labelled and prepared (one set for each pass).    After each pass, " the remainder of the material in the needle is rinsed and submitted in [employer] for cell block processing.   The needles encountered limited resistance. The patient tolerated the procedure well and there were no complications. A band-aid was placed on the needle entry sites on the neck after applying local pressure.    I was present throughout and performed the entire procedure.      Post-procedure details:     Procedure completion:  Tolerated well, no immediate complications      Assessment/Plan   89F with asymptomatic thyroid nodule and hypothyroidism  - follow-up results of FNA; will call with results and next step(s)  Problem List Items Addressed This Visit    None  Visit Diagnoses         Codes    Thyroid nodule    -  Primary E04.1    Relevant Orders    Cytology Consultation (Non-Gynecologic)                 Shahzad Vilchis MD 02/12/24 10:10 AM

## 2024-02-13 ENCOUNTER — TELEPHONE (OUTPATIENT)
Dept: PRIMARY CARE | Facility: CLINIC | Age: 89
End: 2024-02-13
Payer: MEDICARE

## 2024-02-13 DIAGNOSIS — K21.9 GERD WITHOUT ESOPHAGITIS: ICD-10-CM

## 2024-02-13 PROBLEM — E44.1 MILD PROTEIN-CALORIE MALNUTRITION (MULTI): Status: ACTIVE | Noted: 2024-02-13

## 2024-02-13 RX ORDER — PANTOPRAZOLE SODIUM 20 MG/1
20 TABLET, DELAYED RELEASE ORAL DAILY
Qty: 90 TABLET | Refills: 1 | Status: SHIPPED | OUTPATIENT
Start: 2024-02-13 | End: 2024-02-16 | Stop reason: WASHOUT

## 2024-02-13 NOTE — TELEPHONE ENCOUNTER
PATIENT NEEDS A REFILL ON PANTOPRAZOLE    -SHE HAS NOW CHANGED PHARMACIES, AND SHE IS OUT OF REFILLS    PHARMACY: [facility], [place]

## 2024-02-14 ENCOUNTER — APPOINTMENT (OUTPATIENT)
Dept: GASTROENTEROLOGY | Facility: CLINIC | Age: 89
End: 2024-02-14
Payer: MEDICARE

## 2024-02-15 ENCOUNTER — TELEPHONE (OUTPATIENT)
Dept: SURGERY | Facility: CLINIC | Age: 89
End: 2024-02-15
Payer: MEDICARE

## 2024-02-15 DIAGNOSIS — E04.1 THYROID NODULE: Primary | ICD-10-CM

## 2024-02-15 LAB
LABORATORY COMMENT REPORT: NORMAL
LABORATORY COMMENT REPORT: NORMAL
PATH REPORT.COMMENTS IMP SPEC: NORMAL
PATH REPORT.FINAL DX SPEC: NORMAL
PATH REPORT.GROSS SPEC: NORMAL
PATH REPORT.RELEVANT HX SPEC: NORMAL
PATH REPORT.TOTAL CANCER: NORMAL

## 2024-02-15 NOTE — TELEPHONE ENCOUNTER
LEFT MESSAGE ON MACHINE for patient with Dr Traylor's message.  Gave our Patient  phone number.  Told pt to call office if any questions.      Female

## 2024-02-15 NOTE — RESULT ENCOUNTER NOTE
Thyroid RML nodule FNA bx = benign  Will repeat thyroid ultrasound in 1 year to re-evaluate then.   Will call patient to help schedule.

## 2024-02-15 NOTE — TELEPHONE ENCOUNTER
----- Message from Shahzad Vilchis MD sent at 2/15/2024  1:06 PM EST -----  Regarding: Thyroid ultrasound  Priscila - could you please call  to let her know that her thyroid FNA biopsy showed just a benign nodule.    I've also ordered a repeat ultrasound to be done in 1 year.  Could you help her schedule it?    TY

## 2024-02-16 ENCOUNTER — TELEPHONE (OUTPATIENT)
Dept: PRIMARY CARE | Facility: CLINIC | Age: 89
End: 2024-02-16
Payer: MEDICARE

## 2024-02-16 DIAGNOSIS — K21.9 GERD WITHOUT ESOPHAGITIS: Primary | ICD-10-CM

## 2024-02-16 RX ORDER — FAMOTIDINE 40 MG/1
40 TABLET, FILM COATED ORAL DAILY
Qty: 90 TABLET | Refills: 1 | Status: SHIPPED | OUTPATIENT
Start: 2024-02-16 | End: 2025-02-15

## 2024-02-16 NOTE — TELEPHONE ENCOUNTER
Pt called and stated that she read about all the side effects of taking pantoprazole (ProtoNix) 20 mg EC tablet for over 3 years after age 65. She's 89. She said she would like to stop taking this medication. Can she just stop it, or should she wean off? And is there a different med you could put her on? Please advise.

## 2024-02-20 NOTE — TELEPHONE ENCOUNTER
Talked with pt and gave Dr Traylor's message.  Pt said she really appreciates the change because she is feeling so much better.  Still having a little stomach pain but that is all.  Pt had no questions at this time.

## 2024-02-27 ENCOUNTER — HOSPITAL ENCOUNTER (OUTPATIENT)
Dept: RADIOLOGY | Facility: CLINIC | Age: 89
Discharge: HOME | End: 2024-02-27
Payer: MEDICARE

## 2024-02-27 DIAGNOSIS — M81.0 POSTMENOPAUSAL BONE LOSS: ICD-10-CM

## 2024-02-27 PROCEDURE — 77080 DXA BONE DENSITY AXIAL: CPT

## 2024-02-28 ENCOUNTER — PROCEDURE VISIT (OUTPATIENT)
Dept: UROLOGY | Facility: CLINIC | Age: 89
End: 2024-02-28
Payer: MEDICARE

## 2024-02-28 DIAGNOSIS — N32.81 OAB (OVERACTIVE BLADDER): Primary | ICD-10-CM

## 2024-02-28 PROCEDURE — 64566 NEUROELTRD STIM POST TIBIAL: CPT

## 2024-02-28 NOTE — PROGRESS NOTES
[facility]  Outpatient Clinic Note    Patient: Consuelo Andres  Age/Sex: 89 y.o., female  MRN: [id number]      Procedure: Percutaneous tibial nerve stimulation (PTNS) was prescribed for this patient's Overactive Bladder symptoms.  The needle electrode was inserted in the the lower, inner aspect of the left leg.  The surface electrode was placed on the inside arch of the foot on the treatment leg.  The lead set was connected to the stimulator, and the needle electrode clip was connected to the needle electrode.  The stimulator that produces an adjustable electrical pulse that travels to the sacral nerve plexus via the tibial nerve was adjusted to a comfortable stimulating setting of 3.  The patient's symptoms were reviewed prior to the start of treatment, patient verbally consented to treatment.  Patient tolerated the procedure well.    Procedure Details   Indications: urinary urgency and frequency  PTNS session #: 1/12      Past Medical & Surgical History  Past Medical History:   Diagnosis Date    Anesthesia of skin     Numbness and tingling in right hand    Diverticulitis 01/13/2024    Personal history of diseases of the blood and blood-forming organs and certain disorders involving the immune mechanism     History of anemia    Personal history of other medical treatment 05/06/2021    History of screening mammography    Unspecified cataract     Cataracts, bilateral     Past Surgical History:   Procedure Laterality Date    BLADDER SURGERY  09/07/2017    Bladder Surgery    BREAST BIOPSY  09/07/2017    Biopsy Breast Open    CARDIAC SURGERY  09/07/2017    Heart Surgery    FOOT SURGERY  09/07/2017    Foot Surgery    HYSTERECTOMY  09/07/2017    Hysterectomy    OTHER SURGICAL HISTORY  09/05/2022    Uvulopalatopharyngoplasty    OTHER SURGICAL HISTORY  03/06/2020    Surgery    OTHER SURGICAL HISTORY  11/09/2019    Cardiac catheterization    OTHER SURGICAL HISTORY  11/09/2019    Tonsillectomy       Family  History  Family History   Problem Relation Name Age of Onset    Other (cva) Mother      Coronary artery disease Father      Coronary artery disease Brother      Other (malignant neoplasm) Brother      Stomach cancer Brother         Social History  She reports that she has never smoked. She has never used smokeless tobacco. She reports that she does not drink alcohol and does not use drugs.    Allergies  Penicillins, Caffeine, Ciprofloxacin, Donepezil, Fexofenadine, Lanolin, Lipase-protease-amylase, Methen-m.blue-s.phos-phsal-hyo, Methylprednisolone, Mirabegron, Naproxen, Norfloxacin, Other, Rivastigmine, Sulfamethoxazole-trimethoprim, Clindamycin, Nitrofurantoin, Ofloxacin, Quinolones, Sulfamethoxazole, and Trimethoprim    Medications:  Current Outpatient Medications on File Prior to Visit   Medication Sig Dispense Refill    cholecalciferol (Vitamin D-3) 25 MCG (1000 UT) tablet Take 1 tablet (1,000 Units) by mouth once daily. 90 tablet 1    famotidine (Pepcid) 40 mg tablet Take 1 tablet (40 mg) by mouth once daily. 90 tablet 1    levothyroxine (Synthroid, Levoxyl) 75 mcg tablet TAKE 1 TABLET BY MOUTH EVERY DAY 90 tablet 1    metoprolol succinate XL (Toprol-XL) 25 mg 24 hr tablet Take 1 tablet (25 mg) by mouth once daily. 90 tablet 3    multivitamin (Daily Multi-Vitamin) tablet Take 1 tablet by mouth once daily.       No current facility-administered medications on file prior to visit.          All questions and concerns were answered and addressed.  The patient expressed understanding and agrees with the plan.     Reviewed and approved by JADEN NEWMAN on 2/28/24 at 10:22 AM.

## 2024-03-01 NOTE — PROGRESS NOTES
[facility]  Outpatient Clinic Note    Patient: Consuelo Andres  Age/Sex: 89 y.o., female  MRN: [id number]      Procedure: Percutaneous tibial nerve stimulation (PTNS) was prescribed for this patient's Overactive Bladder symptoms.  The needle electrode was inserted in the the lower, inner aspect of the left leg.  The surface electrode was placed on the inside arch of the foot on the treatment leg.  The lead set was connected to the stimulator, and the needle electrode clip was connected to the needle electrode.  The stimulator that produces an adjustable electrical pulse that travels to the sacral nerve plexus via the tibial nerve was adjusted to a comfortable stimulating setting of 4.  The patient's symptoms were reviewed prior to the start of treatment.  Patient tolerated the procedure well.    Procedure Details   Indications: urinary urgency and frequency   PTNS session #: 2/12          Past Medical & Surgical History  Past Medical History:   Diagnosis Date    Anesthesia of skin     Numbness and tingling in right hand    Diverticulitis 01/13/2024    Personal history of diseases of the blood and blood-forming organs and certain disorders involving the immune mechanism     History of anemia    Personal history of other medical treatment 05/06/2021    History of screening mammography    Unspecified cataract     Cataracts, bilateral     Past Surgical History:   Procedure Laterality Date    BLADDER SURGERY  09/07/2017    Bladder Surgery    BREAST BIOPSY  09/07/2017    Biopsy Breast Open    CARDIAC SURGERY  09/07/2017    Heart Surgery    FOOT SURGERY  09/07/2017    Foot Surgery    HYSTERECTOMY  09/07/2017    Hysterectomy    OTHER SURGICAL HISTORY  09/05/2022    Uvulopalatopharyngoplasty    OTHER SURGICAL HISTORY  03/06/2020    Surgery    OTHER SURGICAL HISTORY  11/09/2019    Cardiac catheterization    OTHER SURGICAL HISTORY  11/09/2019    Tonsillectomy       Family History  Family History   Problem Relation Name  Age of Onset    Other (cva) Mother      Coronary artery disease Father      Coronary artery disease Brother      Other (malignant neoplasm) Brother      Stomach cancer Brother         Social History  She reports that she has never smoked. She has never used smokeless tobacco. She reports that she does not drink alcohol and does not use drugs.    Allergies  Penicillins, Caffeine, Ciprofloxacin, Donepezil, Fexofenadine, Lanolin, Lipase-protease-amylase, Methen-m.blue-s.phos-phsal-hyo, Methylprednisolone, Mirabegron, Naproxen, Norfloxacin, Other, Rivastigmine, Sulfamethoxazole-trimethoprim, Clindamycin, Nitrofurantoin, Ofloxacin, Quinolones, Sulfamethoxazole, and Trimethoprim    Medications:  Current Outpatient Medications on File Prior to Visit   Medication Sig Dispense Refill    cholecalciferol (Vitamin D-3) 25 MCG (1000 UT) tablet Take 1 tablet (1,000 Units) by mouth once daily. 90 tablet 1    famotidine (Pepcid) 40 mg tablet Take 1 tablet (40 mg) by mouth once daily. 90 tablet 1    levothyroxine (Synthroid, Levoxyl) 75 mcg tablet TAKE 1 TABLET BY MOUTH EVERY DAY 90 tablet 1    metoprolol succinate XL (Toprol-XL) 25 mg 24 hr tablet Take 1 tablet (25 mg) by mouth once daily. 90 tablet 3    multivitamin (Daily Multi-Vitamin) tablet Take 1 tablet by mouth once daily.       No current facility-administered medications on file prior to visit.          All questions and concerns were answered and addressed.  The patient expressed understanding and agrees with the plan.     Reviewed and approved by JADEN NEWMAN on 3/5/24 at 1:55 PM.

## 2024-03-05 ENCOUNTER — OFFICE VISIT (OUTPATIENT)
Dept: UROLOGY | Facility: CLINIC | Age: 89
End: 2024-03-05
Payer: MEDICARE

## 2024-03-05 ENCOUNTER — APPOINTMENT (OUTPATIENT)
Dept: UROLOGY | Facility: CLINIC | Age: 89
End: 2024-03-05
Payer: MEDICARE

## 2024-03-05 DIAGNOSIS — N32.81 OAB (OVERACTIVE BLADDER): Primary | ICD-10-CM

## 2024-03-05 PROCEDURE — 1159F MED LIST DOCD IN RCRD: CPT

## 2024-03-05 PROCEDURE — 1036F TOBACCO NON-USER: CPT

## 2024-03-05 PROCEDURE — 64566 NEUROELTRD STIM POST TIBIAL: CPT

## 2024-03-05 PROCEDURE — 1126F AMNT PAIN NOTED NONE PRSNT: CPT

## 2024-03-11 NOTE — PROGRESS NOTES
[facility]  Outpatient Clinic Note    Patient: Consuelo Andres  Age/Sex: 89 y.o., female  MRN: [id number]      Procedure: Percutaneous tibial nerve stimulation (PTNS) was prescribed for this patient's Overactive Bladder symptoms.  The needle electrode was inserted in the the lower, inner aspect of the left leg.  The surface electrode was placed on the inside arch of the foot on the treatment leg.  The lead set was connected to the stimulator, and the needle electrode clip was connected to the needle electrode.  The stimulator that produces an adjustable electrical pulse that travels to the sacral nerve plexus via the tibial nerve was adjusted to a comfortable stimulating setting of 6.  The patient's symptoms were reviewed prior to the start of treatment.  Patient tolerated the procedure well.        Percutaneous Tibial Nerve Stimulation    Date/Time: 3/12/2024 10:04 AM    Performed by: Beth Domingo PA-C  Authorized by: Beth Domingo PA-C    Procedure discussed: discussed risks, benefits and alternatives      Procedure Details     Indications: urge incontinence, urinary frequency and urinary urgency      PTNS session #: 2    Bladder symptoms: unchanged      Ankle used: left      Stimulator intensity (mA): 6        Past Medical & Surgical History  Past Medical History:   Diagnosis Date    Anesthesia of skin     Numbness and tingling in right hand    Diverticulitis 01/13/2024    Personal history of diseases of the blood and blood-forming organs and certain disorders involving the immune mechanism     History of anemia    Personal history of other medical treatment 05/06/2021    History of screening mammography    Unspecified cataract     Cataracts, bilateral     Past Surgical History:   Procedure Laterality Date    BLADDER SURGERY  09/07/2017    Bladder Surgery    BREAST BIOPSY  09/07/2017    Biopsy Breast Open    CARDIAC SURGERY  09/07/2017    Heart Surgery    FOOT SURGERY  09/07/2017    Foot Surgery     HYSTERECTOMY  09/07/2017    Hysterectomy    OTHER SURGICAL HISTORY  09/05/2022    Uvulopalatopharyngoplasty    OTHER SURGICAL HISTORY  03/06/2020    Surgery    OTHER SURGICAL HISTORY  11/09/2019    Cardiac catheterization    OTHER SURGICAL HISTORY  11/09/2019    Tonsillectomy       Family History  Family History   Problem Relation Name Age of Onset    Other (cva) Mother      Coronary artery disease Father      Coronary artery disease Brother      Other (malignant neoplasm) Brother      Stomach cancer Brother         Social History  She reports that she has never smoked. She has never used smokeless tobacco. She reports that she does not drink alcohol and does not use drugs.    Allergies  Penicillins, Caffeine, Ciprofloxacin, Donepezil, Fexofenadine, Lanolin, Lipase-protease-amylase, Methen-m.blue-s.phos-phsal-hyo, Methylprednisolone, Mirabegron, Naproxen, Norfloxacin, Other, Rivastigmine, Sulfamethoxazole-trimethoprim, Clindamycin, Nitrofurantoin, Ofloxacin, Quinolones, Sulfamethoxazole, and Trimethoprim    Medications:  Current Outpatient Medications on File Prior to Visit   Medication Sig Dispense Refill    cholecalciferol (Vitamin D-3) 25 MCG (1000 UT) tablet Take 1 tablet (1,000 Units) by mouth once daily. 90 tablet 1    famotidine (Pepcid) 40 mg tablet Take 1 tablet (40 mg) by mouth once daily. 90 tablet 1    levothyroxine (Synthroid, Levoxyl) 75 mcg tablet TAKE 1 TABLET BY MOUTH EVERY DAY 90 tablet 1    metoprolol succinate XL (Toprol-XL) 25 mg 24 hr tablet Take 1 tablet (25 mg) by mouth once daily. 90 tablet 3    multivitamin (Daily Multi-Vitamin) tablet Take 1 tablet by mouth once daily.       No current facility-administered medications on file prior to visit.          All questions and concerns were answered and addressed.  The patient expressed understanding and agrees with the plan.     Reviewed and approved by JADEN NEWMAN on 3/12/24 at 10:41 AM.

## 2024-03-12 ENCOUNTER — OFFICE VISIT (OUTPATIENT)
Dept: UROLOGY | Facility: CLINIC | Age: 89
End: 2024-03-12
Payer: MEDICARE

## 2024-03-12 DIAGNOSIS — N32.81 OAB (OVERACTIVE BLADDER): Primary | ICD-10-CM

## 2024-03-12 PROCEDURE — 1159F MED LIST DOCD IN RCRD: CPT

## 2024-03-12 PROCEDURE — 64566 NEUROELTRD STIM POST TIBIAL: CPT

## 2024-03-12 PROCEDURE — 1036F TOBACCO NON-USER: CPT

## 2024-03-12 PROCEDURE — 1126F AMNT PAIN NOTED NONE PRSNT: CPT

## 2024-03-13 ENCOUNTER — TELEPHONE (OUTPATIENT)
Dept: PRIMARY CARE | Facility: CLINIC | Age: 89
End: 2024-03-13
Payer: MEDICARE

## 2024-03-13 DIAGNOSIS — H66.90 ACUTE OTITIS MEDIA, UNSPECIFIED OTITIS MEDIA TYPE: Primary | ICD-10-CM

## 2024-03-13 RX ORDER — DOXYCYCLINE 100 MG/1
100 CAPSULE ORAL 2 TIMES DAILY
Qty: 20 CAPSULE | Refills: 0 | Status: SHIPPED | OUTPATIENT
Start: 2024-03-13 | End: 2024-03-23

## 2024-03-13 NOTE — TELEPHONE ENCOUNTER
Pt called in and stated for the past week her ears have itched and she is having a hard time hearing. Pt stated she used ear drops but,she feels like it made it worse, please advise.

## 2024-03-15 NOTE — PROGRESS NOTES
[facility]  Outpatient Clinic Note    Patient: Consuelo Andres  Age/Sex: 89 y.o., female  MRN: [id number]      Procedure: Percutaneous tibial nerve stimulation (PTNS) was prescribed for this patient's Overactive Bladder symptoms.  The needle electrode was inserted in the the lower, inner aspect of the left leg.  The surface electrode was placed on the inside arch of the foot on the treatment leg.  The lead set was connected to the stimulator, and the needle electrode clip was connected to the needle electrode.  The stimulator that produces an adjustable electrical pulse that travels to the sacral nerve plexus via the tibial nerve was adjusted to a comfortable stimulating setting of 3.  The patient's symptoms were reviewed prior to the start of treatment.  Patient tolerated the procedure well.       Percutaneous Tibial Nerve Stimulation    Date/Time: 3/19/2024 10:32 AM    Performed by: Beth Domingo PA-C  Authorized by: Beth Domingo PA-C    Procedure discussed: discussed risks, benefits and alternatives      Procedure Details     Indications: urge incontinence, urinary frequency and urinary urgency      PTNS session #: 3    Bladder symptoms: improved      Ankle used: left      Stimulator intensity (mA): 3          Past Medical & Surgical History  Past Medical History:   Diagnosis Date    Anesthesia of skin     Numbness and tingling in right hand    Diverticulitis 01/13/2024    Personal history of diseases of the blood and blood-forming organs and certain disorders involving the immune mechanism     History of anemia    Personal history of other medical treatment 05/06/2021    History of screening mammography    Unspecified cataract     Cataracts, bilateral     Past Surgical History:   Procedure Laterality Date    BLADDER SURGERY  09/07/2017    Bladder Surgery    BREAST BIOPSY  09/07/2017    Biopsy Breast Open    CARDIAC SURGERY  09/07/2017    Heart Surgery    FOOT SURGERY  09/07/2017    Foot Surgery     HYSTERECTOMY  09/07/2017    Hysterectomy    OTHER SURGICAL HISTORY  09/05/2022    Uvulopalatopharyngoplasty    OTHER SURGICAL HISTORY  03/06/2020    Surgery    OTHER SURGICAL HISTORY  11/09/2019    Cardiac catheterization    OTHER SURGICAL HISTORY  11/09/2019    Tonsillectomy       Family History  Family History   Problem Relation Name Age of Onset    Other (cva) Mother      Coronary artery disease Father      Coronary artery disease Brother      Other (malignant neoplasm) Brother      Stomach cancer Brother         Social History  She reports that she has never smoked. She has never used smokeless tobacco. She reports that she does not drink alcohol and does not use drugs.    Allergies  Penicillins, Caffeine, Ciprofloxacin, Donepezil, Fexofenadine, Lanolin, Lipase-protease-amylase, Methen-m.blue-s.phos-phsal-hyo, Methylprednisolone, Mirabegron, Naproxen, Norfloxacin, Other, Rivastigmine, Sulfamethoxazole-trimethoprim, Clindamycin, Nitrofurantoin, Ofloxacin, Quinolones, Sulfamethoxazole, and Trimethoprim    Medications:  Current Outpatient Medications on File Prior to Visit   Medication Sig Dispense Refill    cholecalciferol (Vitamin D-3) 25 MCG (1000 UT) tablet Take 1 tablet (1,000 Units) by mouth once daily. 90 tablet 1    doxycycline (Vibramycin) 100 mg capsule Take 1 capsule (100 mg) by mouth 2 times a day for 10 days. Take with at least 8 ounces (large glass) of water, do not lie down for 30 minutes after 20 capsule 0    famotidine (Pepcid) 40 mg tablet Take 1 tablet (40 mg) by mouth once daily. 90 tablet 1    levothyroxine (Synthroid, Levoxyl) 75 mcg tablet TAKE 1 TABLET BY MOUTH EVERY DAY 90 tablet 1    metoprolol succinate XL (Toprol-XL) 25 mg 24 hr tablet Take 1 tablet (25 mg) by mouth once daily. 90 tablet 3    multivitamin (Daily Multi-Vitamin) tablet Take 1 tablet by mouth once daily.       No current facility-administered medications on file prior to visit.          All questions and concerns were  answered and addressed.  The patient expressed understanding and agrees with the plan.     Reviewed and approved by JADEN NEWMAN on 3/19/24 at 10:33 AM.

## 2024-03-19 ENCOUNTER — OFFICE VISIT (OUTPATIENT)
Dept: SLEEP MEDICINE | Facility: CLINIC | Age: 89
End: 2024-03-19
Payer: MEDICARE

## 2024-03-19 ENCOUNTER — OFFICE VISIT (OUTPATIENT)
Dept: UROLOGY | Facility: CLINIC | Age: 89
End: 2024-03-19
Payer: MEDICARE

## 2024-03-19 VITALS
SYSTOLIC BLOOD PRESSURE: 134 MMHG | OXYGEN SATURATION: 98 % | HEIGHT: 63 IN | BODY MASS INDEX: 27.64 KG/M2 | HEART RATE: 68 BPM | RESPIRATION RATE: 16 BRPM | DIASTOLIC BLOOD PRESSURE: 82 MMHG | WEIGHT: 156 LBS | TEMPERATURE: 96.1 F

## 2024-03-19 DIAGNOSIS — N32.81 OAB (OVERACTIVE BLADDER): Primary | ICD-10-CM

## 2024-03-19 DIAGNOSIS — G47.33 OBSTRUCTIVE SLEEP APNEA: Primary | ICD-10-CM

## 2024-03-19 PROCEDURE — 1036F TOBACCO NON-USER: CPT

## 2024-03-19 PROCEDURE — 1036F TOBACCO NON-USER: CPT | Performed by: INTERNAL MEDICINE

## 2024-03-19 PROCEDURE — 3075F SYST BP GE 130 - 139MM HG: CPT | Performed by: INTERNAL MEDICINE

## 2024-03-19 PROCEDURE — 1159F MED LIST DOCD IN RCRD: CPT | Performed by: INTERNAL MEDICINE

## 2024-03-19 PROCEDURE — 99213 OFFICE O/P EST LOW 20 MIN: CPT | Performed by: INTERNAL MEDICINE

## 2024-03-19 PROCEDURE — 1160F RVW MEDS BY RX/DR IN RCRD: CPT | Performed by: INTERNAL MEDICINE

## 2024-03-19 PROCEDURE — 3079F DIAST BP 80-89 MM HG: CPT | Performed by: INTERNAL MEDICINE

## 2024-03-19 PROCEDURE — 64566 NEUROELTRD STIM POST TIBIAL: CPT

## 2024-03-19 PROCEDURE — 1159F MED LIST DOCD IN RCRD: CPT

## 2024-03-19 ASSESSMENT — SLEEP AND FATIGUE QUESTIONNAIRES
HOW LIKELY ARE YOU TO NOD OFF OR FALL ASLEEP WHILE WATCHING TV: WOULD NEVER DOZE
HOW LIKELY ARE YOU TO NOD OFF OR FALL ASLEEP WHILE SITTING QUIETLY AFTER LUNCH WITHOUT ALCOHOL: WOULD NEVER DOZE
ESS-CHAD TOTAL SCORE: 4
HOW LIKELY ARE YOU TO NOD OFF OR FALL ASLEEP WHILE SITTING AND TALKING TO SOMEONE: WOULD NEVER DOZE
SITING INACTIVE IN A PUBLIC PLACE LIKE A CLASS ROOM OR A MOVIE THEATER: WOULD NEVER DOZE
HOW LIKELY ARE YOU TO NOD OFF OR FALL ASLEEP WHILE LYING DOWN TO REST IN THE AFTERNOON WHEN CIRCUMSTANCES PERMIT: HIGH CHANCE OF DOZING
HOW LIKELY ARE YOU TO NOD OFF OR FALL ASLEEP WHEN YOU ARE A PASSENGER IN A CAR FOR AN HOUR WITHOUT A BREAK: WOULD NEVER DOZE
HOW LIKELY ARE YOU TO NOD OFF OR FALL ASLEEP IN A CAR, WHILE STOPPED FOR A FEW MINUTES IN TRAFFIC: WOULD NEVER DOZE
HOW LIKELY ARE YOU TO NOD OFF OR FALL ASLEEP WHILE SITTING AND READING: SLIGHT CHANCE OF DOZING

## 2024-03-20 NOTE — PROGRESS NOTES
[facility] SLEEP MEDICINE CLINIC  FOLLOW-UP VISIT NOTE    PCP: Gian Traylor MD    HISTORY OF PRESENT ILLNESS     Patient ID: Consuelo Andres is a 89 y.o. female who presents for follow-up  after sleep study. Patient had history of KATHRYN with CPAP intolerance .     Patient is here alone today.  To review, patient's medical history is notable for HTN, MVP, seasonal allergies, GERD, hypothyroidism, and KATHRYN s/p UPPP, previously on CPAP with CPAP intolerance.     Interval History  Patient was last seen in 10/19/2023. Plan was to do diagnostic PSG and no split for [employer] eligibility .  Since last visit, patient had completed sleep study which showed moderate KATHRYN based on AASM criteria and no KATHRYN based on CMS criteria.    RLS Follow-up:   Denies    SLEEP STUDY HISTORY (personally reviewed raw data such as interpretation report, data sheet, hypnogram, and titration table if available and applicable)  HSAT 3/17/2022: AHI 33.4, RDI 49.7, SpO2 madai 73%, Spent 18 mins with SpO2<89%  PAP titration 9/23/2022: CPAP was started at 4-13 cm H2O. Due to CPAP intolerance at higher CPAP settings, BPAP 13/8 cm H2O was started and increased to 16/8 cm H2O. Due to TECSA at BPAP 15/8 cm H2O, BUR 14 was added which resolved all central events. Hypoxemia and snoring improved as soon as BPAP wss started. No optimal pressure noted in supine sleep. At BPAP-S/T 15/8 cm H2O and BUR 14, RDI 4.8, SpO2 madai 95% but no REM nor supine sleep was present at this pressure setting.  PSG 11/21/2023: RDI3% 22.8, RDI4% 4.7, SpO2 madai 83%. TST 6.2 hours. Sleep eff 81.3%, Study was done in all non-supine sleep.    SLEEP-WAKE SCHEDULE  Bedtime: 10:30 PM to 11 PM daily  Subjective sleep latency: 10-15 minutes, sometimes 30 minutes  Difficulty falling asleep: no  Number of awakenings: 1-2 times per night to go to bathroom  Nocturia: yes  Falls back asleep right away  Difficulty staying asleep: yes  Final wake time: 7 AM daily  Out of bed time:  "7 AM daily  Shift work: no  Naps: 2-3 times per week for 40 minutes each nap  Feels rested after a nap: Yes   Average sleep duration (excluding naps): 8 hours    SLEEP ENVIRONMENT  Sleep location: bed at night, couch during naps  Sleep status: sleeps alone  Preferred sleep position: stomach and side    SOCIAL HISTORY  Smoking: no  ETOH: no  Marijuana: no  Caffeine: no  Sleep aids: no    WEIGHT: stable    Claustrophobia: No     Active Problems, Allergy List, Medication List, and PMH/PSH/FH/Social Hx have been reviewed and reconciled in chart. No significant changes unless documented in the pertinent chart section. Updates made when necessary.     PHYSICAL EXAM     VITAL SIGNS: /82   Pulse 68   Temp 35.6 °C (96.1 °F)   Resp 16   Ht 1.6 m (5' 3\")   Wt 70.8 kg (156 lb)   SpO2 98%   BMI 27.63 kg/m²     NECK CIRCUMFERENCE: 12.5 inches    Today ESS: 4  Last visit ESS: 4  CARLTON: 0    Physical Exam  Constitutional: Awake, not in distress  Skin: Warm, no rash  Neuro: No tremors, moves all extremities  Psych: alert and oriented to time, place, and person    RESULTS/DATA     Iron (ug/dL)   Date Value   08/23/2021 115   07/29/2021 155 (H)     Iron Saturation (%)   Date Value   08/23/2021 42   07/29/2021 57 (H)     TIBC (ug/dL)   Date Value   08/23/2021 276   07/29/2021 270     Ferritin (ug/L)   Date Value   07/29/2021 93       Bicarbonate   Date Value Ref Range Status   12/13/2023 29 21 - 32 mmol/L Final       ASSESSMENT/PLAN     Consuelo Andres is a 89 y.o. female who returns in [facility] for the following problems:    OBSTRUCTIVE SLEEP APNEA, MODERATE (PSG RDI3% 22.8, RDI4% 4.7 )  - Patient is not using her BPAP-S/T machine.   - Personally reviewed available sleep study's raw data such as interpretation report, data sheet, and hypnogram. Discussed results with patient today. All questions answered. A copy of recent testing was either given to patient or released in [facility]. See HPI " for detailed summary of sleep study results.   - Discussed with patient that there are 2 criteria currently being followed to diagnose sleep apnea in a sleep study: AASM recommended criteria and CMS criteria. Discussed with patient that AASM recommended criteria includes all apneas and hypopneas associated with either 3% O2 desaturation or EEG arousals. However, CMS criteria only includes all apneas and hypopneas associated with at least 4% O2 desaturation. Unfortunately, CMS does not recognize arousal-based hypopneas which commonly occur in younger adult population or females in general; hence, CMS criteria always underestimate KATHRYN in young patients.    - Discussed with patient that since RDI4% (aka CMS criteria) did not reach 5 events per hour, I won't be able to order titration PSG or any kind of treatment for KATHRYN even if KATHRYN is indeed present based on RDI3% (AASM recommended criteria). Patient verbalized understanding and was obviously happy to hear that. She agreed to plan as she does not want to use PAP.   - Continue supportive management as follows: Lose weight. Stay off your back when sleeping. Avoid smoking, alcohol, and sedating medications if applicable. Don't drive when sleepy.    HYPERTENSION  - BP stable today.  - Continue anti-hypertensive medications per PCP.  - Supportive management: low salt DASH diet (less than 2000 mg sodium intake daily), moderate intensity aerobic exercise at least 30 minutes 5 days per week, reduce stress, quit smoking, limit alcohol, lose weight, and monitor BP once daily  - PCP following. Defer management to PCP        All of patient's questions were answered. She verbalizes understanding and agreement with my assessment and plan. Refer to after-visit-summary (AVS) for patient education and if applicable, for more details on sleep study preparation, troubleshooting issues with PAP usage, proper cleaning instructions of PAP supplies, and usual recommended replacement schedule  for each of the PAP supplies.     Follow-up as needed for now

## 2024-03-26 ENCOUNTER — OFFICE VISIT (OUTPATIENT)
Dept: UROLOGY | Facility: CLINIC | Age: 89
End: 2024-03-26
Payer: MEDICARE

## 2024-03-26 DIAGNOSIS — N32.81 OAB (OVERACTIVE BLADDER): Primary | ICD-10-CM

## 2024-03-26 PROCEDURE — 1160F RVW MEDS BY RX/DR IN RCRD: CPT

## 2024-03-26 PROCEDURE — 1159F MED LIST DOCD IN RCRD: CPT

## 2024-03-26 PROCEDURE — 64566 NEUROELTRD STIM POST TIBIAL: CPT

## 2024-03-26 PROCEDURE — 1036F TOBACCO NON-USER: CPT

## 2024-03-26 NOTE — PROGRESS NOTES
[facility] [place]  Outpatient Clinic Note    Patient: Consuelo Andres  Age/Sex: 89 y.o., female  MRN: [id number]      Procedure: Percutaneous tibial nerve stimulation (PTNS) was prescribed for this patient's Overactive Bladder symptoms.  The needle electrode was inserted in the the lower, inner aspect of the left leg.  The surface electrode was placed on the inside arch of the foot on the treatment leg.  The lead set was connected to the stimulator, and the needle electrode clip was connected to the needle electrode.  The stimulator that produces an adjustable electrical pulse that travels to the sacral nerve plexus via the tibial nerve was adjusted to a comfortable stimulating setting of 4.  The patient's symptoms were reviewed prior to the start of treatment.  Patient tolerated the procedure well.    Procedure Details   Indications:  urge incontinence, urinary frequency and urinary urgency    PTNS session #: 4/12  Bladder symptoms: improved      Percutaneous Tibial Nerve Stimulation    Date/Time: 3/26/2024 10:05 AM    Performed by: Beth Domingo PA-C  Authorized by: Beth Domingo PA-C    Procedure discussed: discussed risks, benefits and alternatives      Procedure Details     Indications: urinary frequency and urinary urgency      PTNS session #: 4    Bladder symptoms: improved      Ankle used: left      Stimulator intensity (mA): 4           Past Medical & Surgical History  Past Medical History:   Diagnosis Date    Anesthesia of skin     Numbness and tingling in right hand    Diverticulitis 01/13/2024    Personal history of diseases of the blood and blood-forming organs and certain disorders involving the immune mechanism     History of anemia    Personal history of other medical treatment 05/06/2021    History of screening mammography    Unspecified cataract     Cataracts, bilateral     Past Surgical History:   Procedure Laterality Date    BLADDER SURGERY  09/07/2017    Bladder Surgery    BREAST BIOPSY   2017    Biopsy Breast Open    CARDIAC SURGERY  2017    Heart Surgery    FOOT SURGERY  2017    Foot Surgery    HYSTERECTOMY  2017    Hysterectomy    OTHER SURGICAL HISTORY  2022    Uvulopalatopharyngoplasty    OTHER SURGICAL HISTORY  2020    Surgery    OTHER SURGICAL HISTORY  2019    Cardiac catheterization    OTHER SURGICAL HISTORY  2019    Tonsillectomy       Family History  Family History   Problem Relation Name Age of Onset    Other (cva) Mother      Coronary artery disease Father      Coronary artery disease Brother      Other (malignant neoplasm) Brother      Stomach cancer Brother         Social History  She reports that she has never smoked. She has never used smokeless tobacco. She reports that she does not drink alcohol and does not use drugs.    Allergies  Penicillins, Caffeine, Ciprofloxacin, Donepezil, Fexofenadine, Lanolin, Lipase-protease-amylase, Methen-m.blue-s.phos-phsal-hyo, Methylprednisolone, Mirabegron, Naproxen, Norfloxacin, Other, Rivastigmine, Sulfamethoxazole-trimethoprim, Clindamycin, Nitrofurantoin, Ofloxacin, Quinolones, Sulfamethoxazole, and Trimethoprim    Medications:  Current Outpatient Medications on File Prior to Visit   Medication Sig Dispense Refill    cholecalciferol (Vitamin D-3) 25 MCG (1000 UT) tablet Take 1 tablet (1,000 Units) by mouth once daily. 90 tablet 1    [] doxycycline (Vibramycin) 100 mg capsule Take 1 capsule (100 mg) by mouth 2 times a day for 10 days. Take with at least 8 ounces (large glass) of water, do not lie down for 30 minutes after (Patient not taking: Reported on 3/19/2024) 20 capsule 0    famotidine (Pepcid) 40 mg tablet Take 1 tablet (40 mg) by mouth once daily. 90 tablet 1    levothyroxine (Synthroid, Levoxyl) 75 mcg tablet TAKE 1 TABLET BY MOUTH EVERY DAY 90 tablet 1    metoprolol succinate XL (Toprol-XL) 25 mg 24 hr tablet Take 1 tablet (25 mg) by mouth once daily. 90 tablet 3    multivitamin  (Daily Multi-Vitamin) tablet Take 1 tablet by mouth once daily.       No current facility-administered medications on file prior to visit.          All questions and concerns were answered and addressed.  The patient expressed understanding and agrees with the plan.     Reviewed and approved by JADEN NEWMAN on 3/26/24 at 10:06 AM.

## 2024-03-29 NOTE — PROGRESS NOTES
[facility] [place]  Outpatient Clinic Note    Patient: Consuelo Andres  Age/Sex: 89 y.o., female  MRN: [id number]      Procedure: Percutaneous tibial nerve stimulation (PTNS) was prescribed for this patient's Overactive Bladder symptoms.  The needle electrode was inserted in the the lower, inner aspect of the left leg.  The surface electrode was placed on the inside arch of the foot on the treatment leg.  The lead set was connected to the stimulator, and the needle electrode clip was connected to the needle electrode.  The stimulator that produces an adjustable electrical pulse that travels to the sacral nerve plexus via the tibial nerve was adjusted to a comfortable stimulating setting of 1.  The patient's symptoms were reviewed prior to the start of treatment.  Patient tolerated the procedure well.    Procedure Details   Indications: urge incontinence, urinary frequency and urinary urgency   PTNS session #: 5/12  Bladder symptoms: improved      Percutaneous Tibial Nerve Stimulation    Date/Time: 4/2/2024 10:04 AM    Performed by: Beth Domingo PA-C  Authorized by: Beth Domingo PA-C    Procedure discussed: discussed risks, benefits and alternatives      Procedure Details     Indications: urge incontinence, urinary frequency and urinary urgency      PTNS session #: 5    Bladder symptoms: improved      Ankle used: left      Stimulator intensity (mA): 1           Past Medical & Surgical History  Past Medical History:   Diagnosis Date    Anesthesia of skin     Numbness and tingling in right hand    Diverticulitis 01/13/2024    Personal history of diseases of the blood and blood-forming organs and certain disorders involving the immune mechanism     History of anemia    Personal history of other medical treatment 05/06/2021    History of screening mammography    Unspecified cataract     Cataracts, bilateral     Past Surgical History:   Procedure Laterality Date    BLADDER SURGERY  09/07/2017    Bladder Surgery     BREAST BIOPSY  2017    Biopsy Breast Open    CARDIAC SURGERY  2017    Heart Surgery    FOOT SURGERY  2017    Foot Surgery    HYSTERECTOMY  2017    Hysterectomy    OTHER SURGICAL HISTORY  2022    Uvulopalatopharyngoplasty    OTHER SURGICAL HISTORY  2020    Surgery    OTHER SURGICAL HISTORY  2019    Cardiac catheterization    OTHER SURGICAL HISTORY  2019    Tonsillectomy       Family History  Family History   Problem Relation Name Age of Onset    Other (cva) Mother      Coronary artery disease Father      Coronary artery disease Brother      Other (malignant neoplasm) Brother      Stomach cancer Brother         Social History  She reports that she has never smoked. She has never used smokeless tobacco. She reports that she does not drink alcohol and does not use drugs.    Allergies  Penicillins, Caffeine, Ciprofloxacin, Donepezil, Fexofenadine, Lanolin, Lipase-protease-amylase, Methen-m.blue-s.phos-phsal-hyo, Methylprednisolone, Mirabegron, Naproxen, Norfloxacin, Other, Rivastigmine, Sulfamethoxazole-trimethoprim, Clindamycin, Nitrofurantoin, Ofloxacin, Quinolones, Sulfamethoxazole, and Trimethoprim    Medications:  Current Outpatient Medications on File Prior to Visit   Medication Sig Dispense Refill    cholecalciferol (Vitamin D-3) 25 MCG (1000 UT) tablet Take 1 tablet (1,000 Units) by mouth once daily. 90 tablet 1    [] doxycycline (Vibramycin) 100 mg capsule Take 1 capsule (100 mg) by mouth 2 times a day for 10 days. Take with at least 8 ounces (large glass) of water, do not lie down for 30 minutes after (Patient not taking: Reported on 3/19/2024) 20 capsule 0    famotidine (Pepcid) 40 mg tablet Take 1 tablet (40 mg) by mouth once daily. 90 tablet 1    levothyroxine (Synthroid, Levoxyl) 75 mcg tablet TAKE 1 TABLET BY MOUTH EVERY DAY 90 tablet 1    metoprolol succinate XL (Toprol-XL) 25 mg 24 hr tablet Take 1 tablet (25 mg) by mouth once daily. 90 tablet 3     multivitamin (Daily Multi-Vitamin) tablet Take 1 tablet by mouth once daily.       No current facility-administered medications on file prior to visit.          All questions and concerns were answered and addressed.  The patient expressed understanding and agrees with the plan.     Reviewed and approved by JADEN NEWMAN on 3/29/24 at 9:22 AM.

## 2024-04-02 ENCOUNTER — OFFICE VISIT (OUTPATIENT)
Dept: UROLOGY | Facility: CLINIC | Age: 89
End: 2024-04-02
Payer: MEDICARE

## 2024-04-02 DIAGNOSIS — N39.41 URGE INCONTINENCE: ICD-10-CM

## 2024-04-02 DIAGNOSIS — N32.81 OAB (OVERACTIVE BLADDER): Primary | ICD-10-CM

## 2024-04-02 PROCEDURE — 1159F MED LIST DOCD IN RCRD: CPT

## 2024-04-02 PROCEDURE — 64566 NEUROELTRD STIM POST TIBIAL: CPT

## 2024-04-02 PROCEDURE — 1160F RVW MEDS BY RX/DR IN RCRD: CPT

## 2024-04-08 NOTE — PROGRESS NOTES
[facility] [place]  Outpatient Clinic Note    Patient: Consuelo Andres  Age/Sex: 89 y.o., female  MRN: [id number]      Procedure: Percutaneous tibial nerve stimulation (PTNS) was prescribed for this patient's Overactive Bladder symptoms.  The needle electrode was inserted in the the lower, inner aspect of the left leg.  The surface electrode was placed on the inside arch of the foot on the treatment leg.  The lead set was connected to the stimulator, and the needle electrode clip was connected to the needle electrode.  The stimulator that produces an adjustable electrical pulse that travels to the sacral nerve plexus via the tibial nerve was adjusted to a comfortable stimulating setting of 1.  The patient's symptoms were reviewed prior to the start of treatment.  Patient tolerated the procedure well.      Percutaneous Tibial Nerve Stimulation    Date/Time: 4/9/2024 10:02 AM    Performed by: Beth Domingo PA-C  Authorized by: Beth Domingo PA-C    Procedure discussed: discussed risks, benefits and alternatives      Procedure Details     Indications: urge incontinence, urinary frequency and urinary urgency      PTNS session #: 6    Bladder symptoms: unchanged      Ankle used: left      Stimulator intensity (mA): 1         Past Medical & Surgical History  Past Medical History:   Diagnosis Date    Anesthesia of skin     Numbness and tingling in right hand    Diverticulitis 01/13/2024    Personal history of diseases of the blood and blood-forming organs and certain disorders involving the immune mechanism     History of anemia    Personal history of other medical treatment 05/06/2021    History of screening mammography    Unspecified cataract     Cataracts, bilateral     Past Surgical History:   Procedure Laterality Date    BLADDER SURGERY  09/07/2017    Bladder Surgery    BREAST BIOPSY  09/07/2017    Biopsy Breast Open    CARDIAC SURGERY  09/07/2017    Heart Surgery    FOOT SURGERY  09/07/2017    Foot Surgery     HYSTERECTOMY  09/07/2017    Hysterectomy    OTHER SURGICAL HISTORY  09/05/2022    Uvulopalatopharyngoplasty    OTHER SURGICAL HISTORY  03/06/2020    Surgery    OTHER SURGICAL HISTORY  11/09/2019    Cardiac catheterization    OTHER SURGICAL HISTORY  11/09/2019    Tonsillectomy       Family History  Family History   Problem Relation Name Age of Onset    Other (cva) Mother      Coronary artery disease Father      Coronary artery disease Brother      Other (malignant neoplasm) Brother      Stomach cancer Brother         Social History  She reports that she has never smoked. She has never used smokeless tobacco. She reports that she does not drink alcohol and does not use drugs.    Allergies  Penicillins, Caffeine, Ciprofloxacin, Donepezil, Fexofenadine, Lanolin, Lipase-protease-amylase, Methen-m.blue-s.phos-phsal-hyo, Methylprednisolone, Mirabegron, Naproxen, Norfloxacin, Other, Rivastigmine, Sulfamethoxazole-trimethoprim, Clindamycin, Nitrofurantoin, Ofloxacin, Quinolones, Sulfamethoxazole, and Trimethoprim    Medications:  Current Outpatient Medications on File Prior to Visit   Medication Sig Dispense Refill    cholecalciferol (Vitamin D-3) 25 MCG (1000 UT) tablet Take 1 tablet (1,000 Units) by mouth once daily. 90 tablet 1    famotidine (Pepcid) 40 mg tablet Take 1 tablet (40 mg) by mouth once daily. 90 tablet 1    levothyroxine (Synthroid, Levoxyl) 75 mcg tablet TAKE 1 TABLET BY MOUTH EVERY DAY 90 tablet 1    metoprolol succinate XL (Toprol-XL) 25 mg 24 hr tablet Take 1 tablet (25 mg) by mouth once daily. 90 tablet 3    multivitamin (Daily Multi-Vitamin) tablet Take 1 tablet by mouth once daily.       No current facility-administered medications on file prior to visit.          All questions and concerns were answered and addressed.  The patient expressed understanding and agrees with the plan.     Reviewed and approved by JADEN NEWMAN on 4/8/24 at 12:24 PM.

## 2024-04-09 ENCOUNTER — TELEPHONE (OUTPATIENT)
Dept: PRIMARY CARE | Facility: CLINIC | Age: 89
End: 2024-04-09

## 2024-04-09 ENCOUNTER — OFFICE VISIT (OUTPATIENT)
Dept: UROLOGY | Facility: CLINIC | Age: 89
End: 2024-04-09
Payer: MEDICARE

## 2024-04-09 DIAGNOSIS — N39.41 URGE INCONTINENCE: Primary | ICD-10-CM

## 2024-04-09 DIAGNOSIS — N32.81 OAB (OVERACTIVE BLADDER): ICD-10-CM

## 2024-04-09 PROCEDURE — 1036F TOBACCO NON-USER: CPT

## 2024-04-09 PROCEDURE — 64566 NEUROELTRD STIM POST TIBIAL: CPT

## 2024-04-09 PROCEDURE — 1159F MED LIST DOCD IN RCRD: CPT

## 2024-04-09 PROCEDURE — 1160F RVW MEDS BY RX/DR IN RCRD: CPT

## 2024-04-09 NOTE — TELEPHONE ENCOUNTER
Patient called in stating that a doctor within [facility] is requesting the patient to have a colonoscopy and she would like to know what Dr. WATSON thinks. Patient states she has heard many stories about patients her age not having colonoscopies and the risks. Please advise.

## 2024-04-10 NOTE — TELEPHONE ENCOUNTER
Patient notified she is not having any problems and hasn't had problems for a long time so she would rather not get one done at this time

## 2024-04-11 NOTE — PROGRESS NOTES
[facility] [place]  Outpatient Clinic Note    Patient: Consuelo Andres  Age/Sex: 89 y.o., female  MRN: [id number]      Procedure: Percutaneous tibial nerve stimulation (PTNS) was prescribed for this patient's Overactive Bladder symptoms.  The needle electrode was inserted in the the lower, inner aspect of the left leg.  The surface electrode was placed on the inside arch of the foot on the treatment leg.  The lead set was connected to the stimulator, and the needle electrode clip was connected to the needle electrode.  The stimulator that produces an adjustable electrical pulse that travels to the sacral nerve plexus via the tibial nerve was adjusted to a comfortable stimulating setting of 1.  The patient's symptoms were reviewed prior to the start of treatment.  Patient tolerated the procedure well.      Percutaneous Tibial Nerve Stimulation    Date/Time: 4/16/2024 10:10 AM    Performed by: Beth Domingo PA-C  Authorized by: Beth Domingo PA-C    Procedure discussed: discussed risks, benefits and alternatives      Procedure Details     Indications: urge incontinence, urinary frequency and urinary urgency      PTNS session #: 8    Bladder symptoms: unchanged      Ankle used: left      Stimulator intensity (mA): 1               Past Medical & Surgical History  Past Medical History:   Diagnosis Date    Anesthesia of skin     Numbness and tingling in right hand    Diverticulitis 01/13/2024    Personal history of diseases of the blood and blood-forming organs and certain disorders involving the immune mechanism     History of anemia    Personal history of other medical treatment 05/06/2021    History of screening mammography    Unspecified cataract     Cataracts, bilateral     Past Surgical History:   Procedure Laterality Date    BLADDER SURGERY  09/07/2017    Bladder Surgery    BREAST BIOPSY  09/07/2017    Biopsy Breast Open    CARDIAC SURGERY  09/07/2017    Heart Surgery    FOOT SURGERY  09/07/2017    Foot Surgery     HYSTERECTOMY  09/07/2017    Hysterectomy    OTHER SURGICAL HISTORY  09/05/2022    Uvulopalatopharyngoplasty    OTHER SURGICAL HISTORY  03/06/2020    Surgery    OTHER SURGICAL HISTORY  11/09/2019    Cardiac catheterization    OTHER SURGICAL HISTORY  11/09/2019    Tonsillectomy       Family History  Family History   Problem Relation Name Age of Onset    Other (cva) Mother      Coronary artery disease Father      Coronary artery disease Brother      Other (malignant neoplasm) Brother      Stomach cancer Brother         Social History  She reports that she has never smoked. She has never used smokeless tobacco. She reports that she does not drink alcohol and does not use drugs.    Allergies  Penicillins, Caffeine, Ciprofloxacin, Donepezil, Fexofenadine, Lanolin, Lipase-protease-amylase, Methen-m.blue-s.phos-phsal-hyo, Methylprednisolone, Mirabegron, Naproxen, Norfloxacin, Other, Rivastigmine, Sulfamethoxazole-trimethoprim, Clindamycin, Nitrofurantoin, Ofloxacin, Quinolones, Sulfamethoxazole, and Trimethoprim    Medications:  Current Outpatient Medications on File Prior to Visit   Medication Sig Dispense Refill    cholecalciferol (Vitamin D-3) 25 MCG (1000 UT) tablet Take 1 tablet (1,000 Units) by mouth once daily. 90 tablet 1    famotidine (Pepcid) 40 mg tablet Take 1 tablet (40 mg) by mouth once daily. 90 tablet 1    levothyroxine (Synthroid, Levoxyl) 75 mcg tablet TAKE 1 TABLET BY MOUTH EVERY DAY 90 tablet 1    metoprolol succinate XL (Toprol-XL) 25 mg 24 hr tablet Take 1 tablet (25 mg) by mouth once daily. 90 tablet 3    multivitamin (Daily Multi-Vitamin) tablet Take 1 tablet by mouth once daily.       No current facility-administered medications on file prior to visit.          All questions and concerns were answered and addressed.  The patient expressed understanding and agrees with the plan.     Reviewed and approved by JADEN NEWMAN on 4/16/24 at 7:25 AM.

## 2024-04-16 ENCOUNTER — OFFICE VISIT (OUTPATIENT)
Dept: UROLOGY | Facility: CLINIC | Age: 89
End: 2024-04-16
Payer: MEDICARE

## 2024-04-16 DIAGNOSIS — N39.41 URGE INCONTINENCE: Primary | ICD-10-CM

## 2024-04-16 DIAGNOSIS — N32.81 OAB (OVERACTIVE BLADDER): ICD-10-CM

## 2024-04-16 PROCEDURE — 64566 NEUROELTRD STIM POST TIBIAL: CPT

## 2024-04-16 PROCEDURE — 1160F RVW MEDS BY RX/DR IN RCRD: CPT

## 2024-04-16 PROCEDURE — 1159F MED LIST DOCD IN RCRD: CPT

## 2024-04-23 ENCOUNTER — APPOINTMENT (OUTPATIENT)
Dept: UROLOGY | Facility: CLINIC | Age: 89
End: 2024-04-23
Payer: MEDICARE

## 2024-04-30 ENCOUNTER — APPOINTMENT (OUTPATIENT)
Dept: UROLOGY | Facility: CLINIC | Age: 89
End: 2024-04-30
Payer: MEDICARE

## 2024-05-07 PROBLEM — N39.41 URGE INCONTINENCE OF URINE: Status: ACTIVE | Noted: 2024-05-07

## 2024-05-07 RX ORDER — ROSUVASTATIN CALCIUM 5 MG/1
5 TABLET, COATED ORAL NIGHTLY
COMMUNITY
Start: 2024-03-31 | End: 2024-05-14 | Stop reason: SDUPTHER

## 2024-05-09 NOTE — PROGRESS NOTES
"Primary Cardiologist: Dr. Dennis    Chief Complaint:   Follow-up (6 month)     History Of Present Illness:    Consuelo Andres is a 89 y.o. female with past medical history of HTN, DL, mitral valve prolapse, KATHRYN - has not used her CPAP for many years presents today for 6 month follow up.   At last office visit, no medication adjustments were made and no additional testing was ordered.     Today, Consuelo Andres is feeling well overall, no cardiac concerns today, reports she is tired but attributes that to \"old age\".   Denies chest pain/pressure, no dizziness or lightheadedness, no shortness of breath, and no palpitations. She denies lower extremity edema, orthopnea or PND.     Last Recorded Vitals:  Visit Vitals  /62 (BP Location: Left arm)   Pulse 66   Ht 1.6 m (5' 3\")   Wt 71.2 kg (157 lb)   SpO2 99%   BMI 27.81 kg/m²   Smoking Status Never   BSA 1.78 m²        Past Medical History:  She has a past medical history of Anesthesia of skin, Diverticulitis (01/13/2024), Personal history of diseases of the blood and blood-forming organs and certain disorders involving the immune mechanism, Personal history of other medical treatment (05/06/2021), and Unspecified cataract.    Past Surgical History:  She has a past surgical history that includes Bladder surgery (09/07/2017); Breast biopsy (09/07/2017); Cardiac surgery (09/07/2017); Foot surgery (09/07/2017); Hysterectomy (09/07/2017); Other surgical history (09/05/2022); Other surgical history (03/06/2020); Other surgical history (11/09/2019); and Other surgical history (11/09/2019).      Social History:  She reports that she has never smoked. She has never used smokeless tobacco. She reports that she does not drink alcohol and does not use drugs.    Family History:  Family History   Problem Relation Name Age of Onset    Other (cva) Mother      Coronary artery disease Father      Coronary artery disease Brother      Other (malignant neoplasm) Brother      Stomach " cancer Brother          Allergies:  Penicillins, Caffeine, Ciprofloxacin, Donepezil, Fexofenadine, Lanolin, Lipase-protease-amylase, Methen-m.blue-s.phos-phsal-hyo, Methylprednisolone, Mirabegron, Naproxen, Norfloxacin, Rivastigmine, Clindamycin, Nitrofurantoin, Ofloxacin, Quinolones, Sulfamethoxazole, and Trimethoprim    Outpatient Medications:  Current Outpatient Medications   Medication Instructions    cholecalciferol (VITAMIN D-3) 1,000 Units, oral, Daily    famotidine (PEPCID) 40 mg, oral, Daily    levothyroxine (SYNTHROID, LEVOXYL) 75 mcg, oral, Daily    metoprolol succinate XL (TOPROL-XL) 25 mg, oral, Daily    multivitamin (Daily Multi-Vitamin) tablet 1 tablet, oral, Daily    rosuvastatin (CRESTOR) 5 mg, oral, Nightly         Review of Systems   Constitutional: Positive for malaise/fatigue.   HENT: Negative.     Eyes: Negative.    Cardiovascular:  Negative for chest pain, dyspnea on exertion, leg swelling and palpitations.   Respiratory:  Negative for shortness of breath.    Endocrine: Negative.    Hematologic/Lymphatic: Negative.    Skin: Negative.    Musculoskeletal: Negative.    Gastrointestinal: Negative.    Genitourinary: Negative.    Neurological:  Negative for dizziness and light-headedness.   Psychiatric/Behavioral: Negative.          Physical Exam  Vitals reviewed.   Constitutional:       Appearance: Normal appearance.   HENT:      Head: Normocephalic.      Mouth/Throat:      Mouth: Mucous membranes are moist.   Cardiovascular:      Rate and Rhythm: Normal rate and regular rhythm.      Pulses: Normal pulses.      Heart sounds: Normal heart sounds. No murmur heard.     No friction rub. No gallop.   Pulmonary:      Effort: Pulmonary effort is normal.      Breath sounds: Normal breath sounds. No wheezing, rhonchi or rales.   Abdominal:      General: Bowel sounds are normal.      Palpations: Abdomen is soft.   Musculoskeletal:         General: Normal range of motion.      Cervical back: Normal range of  "motion.      Right lower leg: No edema.      Left lower leg: No edema.   Skin:     General: Skin is warm and dry.   Neurological:      General: No focal deficit present.      Mental Status: She is alert and oriented to person, place, and time.   Psychiatric:         Mood and Affect: Mood normal.         Behavior: Behavior normal.         Thought Content: Thought content normal.         Judgment: Judgment normal.           Last Labs:  CBC -  Lab Results   Component Value Date    WBC 7.6 12/13/2023    HGB 15.3 12/13/2023    HCT 47.6 (H) 12/13/2023    MCV 97 12/13/2023     12/13/2023       CMP -  Lab Results   Component Value Date    CALCIUM 9.1 12/13/2023    PROT 6.3 (L) 12/13/2023    ALBUMIN 4.1 12/13/2023    AST 20 12/13/2023    ALT 11 12/13/2023    ALKPHOS 63 12/13/2023    BILITOT 1.1 12/13/2023       LIPID PANEL -   Lab Results   Component Value Date    CHOL 200 (H) 12/13/2023    TRIG 112 12/13/2023    HDL 57.5 12/13/2023    CHHDL 3.5 12/13/2023    LDLF 120 (H) 01/13/2023    VLDL 22 12/13/2023    NHDL 143 12/13/2023       RENAL FUNCTION PANEL -   Lab Results   Component Value Date    GLUCOSE 86 12/13/2023     12/13/2023    K 4.3 12/13/2023     12/13/2023    CO2 29 12/13/2023    ANIONGAP 10 12/13/2023    BUN 22 12/13/2023    CREATININE 0.97 12/13/2023    CALCIUM 9.1 12/13/2023    ALBUMIN 4.1 12/13/2023        Lab Results   Component Value Date    BNP 66 05/25/2022       Last Cardiology Tests:  ECG:  No results found for this or any previous visit from the past 1095 days.      Echo:  TTE 5/12/22  The left ventricular systolic function is normal with a 60-65% estimated ejection fraction.   2. Spectral Doppler shows an impaired relaxation pattern of left ventricular diastolic filling.   3. There is a moderate pleural effusion.  Ejection Fractions:  No results found for: \"EF\"    Cath:  No results found for this or any previous visit from the past 1095 days.      Stress Test:  NM Stress Test " 3/21/22:  IMPRESSION:  1. Normal stress myocardial perfusion imaging in response to  pharmacologic stress.  2. Well-maintained left ventricular function.  Cardiac Imaging:  No results found for this or any previous visit from the past 1095 days.        Lab review: I have personally reviewed the laboratory result(s)     Assessment/Plan     Consuelo Andres is a 89 y.o. female with past medical history of HTN, DL, mitral valve prolapse, KATHRYN - has not used her CPAP for many years presents today for 6 month follow up.   At last office visit, no medication adjustments were made and no additional testing was ordered.        Atypical chest pain  At recent office visit, pinpoint pain in the left breast, random, lasting a few seconds   Recommended to patient that f this pain becomes more severe/frequent or occurs with exertion, at which point we would check a stress test  Today, patient states she gets pain on occasion that lasts seconds, not concerning and reports it has not changed for years.   No additional testing at this time, appears stable.    Hypertension  Today BP in office 124/62  Well controlled  Continue on metoprolol     Dyslipidemia  Lipid panel 12/13/23: Cholesterol 200 HDL 57  triglycerides 112  Reports she is taking Rosuvastatin as directed, will increase Rosuvastatin to 10 mg nightly for elevated LDL.  Follow up labs ordered in July by PCP.      History of MV prolapse  TTE 5/12/22: Mitral Valve: The mitral valve is normal in structure. There is no evidence of mitral valve regurgitation.  Today, patient denies any shortness of breath.  Continue to monitor clinically, recheck echocardiogram if develops HF symptoms      Bridgette Maxwell, CHASTITY-CNP

## 2024-05-14 ENCOUNTER — OFFICE VISIT (OUTPATIENT)
Dept: CARDIOLOGY | Facility: CLINIC | Age: 89
End: 2024-05-14
Payer: MEDICARE

## 2024-05-14 VITALS
DIASTOLIC BLOOD PRESSURE: 62 MMHG | WEIGHT: 157 LBS | OXYGEN SATURATION: 99 % | HEIGHT: 63 IN | HEART RATE: 66 BPM | SYSTOLIC BLOOD PRESSURE: 124 MMHG | BODY MASS INDEX: 27.82 KG/M2

## 2024-05-14 DIAGNOSIS — E78.49 OTHER HYPERLIPIDEMIA: Primary | ICD-10-CM

## 2024-05-14 DIAGNOSIS — I10 HYPERTENSION, UNSPECIFIED TYPE: ICD-10-CM

## 2024-05-14 DIAGNOSIS — I05.9 MITRAL VALVE DISEASE: ICD-10-CM

## 2024-05-14 PROCEDURE — 99213 OFFICE O/P EST LOW 20 MIN: CPT | Performed by: CLINICAL NURSE SPECIALIST

## 2024-05-14 PROCEDURE — 3074F SYST BP LT 130 MM HG: CPT | Performed by: CLINICAL NURSE SPECIALIST

## 2024-05-14 PROCEDURE — 1160F RVW MEDS BY RX/DR IN RCRD: CPT | Performed by: CLINICAL NURSE SPECIALIST

## 2024-05-14 PROCEDURE — 1159F MED LIST DOCD IN RCRD: CPT | Performed by: CLINICAL NURSE SPECIALIST

## 2024-05-14 PROCEDURE — 3078F DIAST BP <80 MM HG: CPT | Performed by: CLINICAL NURSE SPECIALIST

## 2024-05-14 RX ORDER — ROSUVASTATIN CALCIUM 10 MG/1
10 TABLET, COATED ORAL NIGHTLY
Qty: 90 TABLET | Refills: 3 | Status: SHIPPED | OUTPATIENT
Start: 2024-05-14 | End: 2025-05-14

## 2024-05-14 ASSESSMENT — ENCOUNTER SYMPTOMS
PALPITATIONS: 0
PSYCHIATRIC NEGATIVE: 1
GASTROINTESTINAL NEGATIVE: 1
LIGHT-HEADEDNESS: 0
MUSCULOSKELETAL NEGATIVE: 1
DIZZINESS: 0
EYES NEGATIVE: 1
SHORTNESS OF BREATH: 0
ENDOCRINE NEGATIVE: 1
HEMATOLOGIC/LYMPHATIC NEGATIVE: 1
DYSPNEA ON EXERTION: 0

## 2024-05-14 NOTE — PATIENT INSTRUCTIONS
Increase your rosuvastatin to 10 mg nightly. You can take two of your 5 mg tablets until you  your new prescription.   Complete lab work as ordered by PCP in July as ordered.   Call our office with any new cardiac concerns  Continue current medications  Continue heart-healthy diet. A diet low in sodium, low in cholesterol, limiting red meats and eating whole foods.   Follow up with Dr. Dennis in 6 months

## 2024-05-20 NOTE — PROGRESS NOTES
HISTORY OF PRESENT ILLNESS:  She was recently in the hospital for diverticulitis,, continues to have urgency and frequency.  Medication has not helped her.         Past Medical History  She has a past medical history of Anesthesia of skin, Personal history of diseases of the blood and blood-forming organs and certain disorders involving the immune mechanism, Personal history of other medical treatment (05/06/2021), and Unspecified cataract.    Surgical History  She has a past surgical history that includes Bladder surgery (09/07/2017); Breast biopsy (09/07/2017); Cardiac surgery (09/07/2017); Foot surgery (09/07/2017); Hysterectomy (09/07/2017); Other surgical history (09/05/2022); Other surgical history (03/06/2020); Other surgical history (11/09/2019); and Other surgical history (11/09/2019).     Social History  She reports that she has never smoked. She has never used smokeless tobacco. She reports that she does not drink alcohol and does not use drugs.    Family History  Family History   Problem Relation Name Age of Onset    Other (cva) Mother      Coronary artery disease Father      Coronary artery disease Brother      Other (malignant neoplasm) Brother      Stomach cancer Brother          Allergies  Penicillins, Caffeine, Ciprofloxacin, Donepezil, Fexofenadine, Lanolin, Lipase-protease-amylase, Methylprednisolone, Mirabegron, Naproxen, Norfloxacin, Other, Rivastigmine, Sulfamethoxazole-trimethoprim, Clindamycin, Nitrofurantoin, Ofloxacin, Quinolones, Sulfamethoxazole, and Trimethoprim      A comprehensive 10+ review of systems was negative except for: see hpi                          PHYSICAL EXAMINATION:  BP Readings from Last 3 Encounters:   12/18/23 129/72   11/22/23 118/76   11/09/23 118/76      Wt Readings from Last 3 Encounters:   01/11/24 69.9 kg (154 lb)   12/18/23 71.6 kg (157 lb 12.8 oz)   11/22/23 71.2 kg (156 lb 15.5 oz)      BMI: Estimated body mass index is 28.17 kg/m² as calculated from  Spoke with Silke at [facility] and Marin is out all week. She will send him a message to call clinic if there are concerns. Otherwise Silke is aware that this encounter will be closed and no further outreach will be placed as patient is coming in tomorrow to see Dr Marion. Silke verbalizes understanding and is appreciative of call.     "the following:    Height as of 1/11/24: 1.575 m (5' 2\").    Weight as of 1/11/24: 69.9 kg (154 lb).  BSA: Estimated body surface area is 1.75 meters squared as calculated from the following:    Height as of 1/11/24: 1.575 m (5' 2\").    Weight as of 1/11/24: 69.9 kg (154 lb).  HEENT: Normocephalic, atraumatic, PER EOMI, nonicteric, trachea normal, thyroid normal, oropharynx normal.  CARDIAC: regular rate & rhythm, S1 & S2 normal.  No heaves, thrills, gallops or murmurs.  LUNGS: Clear to auscultation, no spinal or CV tenderness.  EXTREMITIES: No evidence of cyanosis, clubbing or edema.               Assessment:  89-year-old with history of prior sling here with urinary incontinence     UI:  UDS showed likely underactive detrusor   flomax made symptoms worse  Myrbetriq did not help nor did gemtesa  Cystoscopy negative     pne, 8/29/23, did well with this but is insertional to proceed with neuromodulation    Will start with PTNS    GUsM:  continue estradiol     Eyal Ray MD      "

## 2024-05-21 ENCOUNTER — OFFICE VISIT (OUTPATIENT)
Dept: UROLOGY | Facility: CLINIC | Age: 89
End: 2024-05-21
Payer: MEDICARE

## 2024-05-21 ENCOUNTER — TELEPHONE (OUTPATIENT)
Dept: UROLOGY | Facility: CLINIC | Age: 89
End: 2024-05-21

## 2024-05-21 DIAGNOSIS — N32.81 OAB (OVERACTIVE BLADDER): Primary | ICD-10-CM

## 2024-05-21 DIAGNOSIS — N39.0 RECURRENT UTI: ICD-10-CM

## 2024-05-21 PROCEDURE — 1159F MED LIST DOCD IN RCRD: CPT | Performed by: STUDENT IN AN ORGANIZED HEALTH CARE EDUCATION/TRAINING PROGRAM

## 2024-05-21 PROCEDURE — 99214 OFFICE O/P EST MOD 30 MIN: CPT | Performed by: STUDENT IN AN ORGANIZED HEALTH CARE EDUCATION/TRAINING PROGRAM

## 2024-05-21 PROCEDURE — 1160F RVW MEDS BY RX/DR IN RCRD: CPT | Performed by: STUDENT IN AN ORGANIZED HEALTH CARE EDUCATION/TRAINING PROGRAM

## 2024-05-21 RX ORDER — DOXAZOSIN 4 MG/1
4 TABLET ORAL NIGHTLY
Qty: 30 TABLET | Refills: 5 | Status: SHIPPED | OUTPATIENT
Start: 2024-05-21 | End: 2024-11-17

## 2024-05-21 NOTE — PROGRESS NOTES
HISTORY OF PRESENT ILLNESS:  Consuelo Andres is a 89 y.o. female who presents today for a follow up visit. She reports she had had minimal improvement with PTNS. Had a good response with PNE but opted to try PTNS.          Past Medical History  She has a past medical history of Anesthesia of skin, Diverticulitis (01/13/2024), Personal history of diseases of the blood and blood-forming organs and certain disorders involving the immune mechanism, Personal history of other medical treatment (05/06/2021), and Unspecified cataract.    Surgical History  She has a past surgical history that includes Bladder surgery (09/07/2017); Breast biopsy (09/07/2017); Cardiac surgery (09/07/2017); Foot surgery (09/07/2017); Hysterectomy (09/07/2017); Other surgical history (09/05/2022); Other surgical history (03/06/2020); Other surgical history (11/09/2019); and Other surgical history (11/09/2019).     Social History  She reports that she has never smoked. She has never used smokeless tobacco. She reports that she does not drink alcohol and does not use drugs.    Family History  Family History   Problem Relation Name Age of Onset    Other (cva) Mother      Coronary artery disease Father      Coronary artery disease Brother      Other (malignant neoplasm) Brother      Stomach cancer Brother          Allergies  Penicillins, Caffeine, Ciprofloxacin, Donepezil, Fexofenadine, Lanolin, Lipase-protease-amylase, Methen-m.blue-s.phos-phsal-hyo, Methylprednisolone, Mirabegron, Naproxen, Norfloxacin, Rivastigmine, Clindamycin, Nitrofurantoin, Ofloxacin, Quinolones, Sulfamethoxazole, and Trimethoprim      A comprehensive 10+ review of systems was negative except for: see hpi                          PHYSICAL EXAMINATION:  BP Readings from Last 3 Encounters:   05/14/24 124/62   03/19/24 134/82   02/12/24 111/70      Wt Readings from Last 3 Encounters:   05/14/24 71.2 kg (157 lb)   03/19/24 70.8 kg (156 lb)   02/12/24 70.9 kg (156 lb 3.2 oz)     "  BMI: Estimated body mass index is 27.81 kg/m² as calculated from the following:    Height as of 5/14/24: 1.6 m (5' 3\").    Weight as of 5/14/24: 71.2 kg (157 lb).  BSA: Estimated body surface area is 1.78 meters squared as calculated from the following:    Height as of 5/14/24: 1.6 m (5' 3\").    Weight as of 5/14/24: 71.2 kg (157 lb).  HEENT: Normocephalic, atraumatic, PER EOMI, nonicteric, trachea normal, thyroid normal, oropharynx normal.  CARDIAC: regular rate & rhythm, S1 & S2 normal.  No heaves, thrills, gallops or murmurs.  LUNGS: Clear to auscultation, no spinal or CV tenderness.  EXTREMITIES: No evidence of cyanosis, clubbing or edema.               Assessment:  89-year-old with history of prior sling here with urinary incontinence     UI:  UDS showed likely underactive detrusor   flomax made symptoms worse  Myrbetriq did not help nor did gemtesa  Cystoscopy negative     pne, 8/29/23, did well with this, but opted to hold off     Finished PTNS, no improvement     Recommended Metamucil daily    Rx Cardura       If no improvement will try to schedule     GUsM:  continue estradiol       Follow up in 8 to 10 weeks       All questions and concerns were answered and addressed.  The patient expressed understanding and agrees with the plan.     Eyal Ray MD    Scribe Attestation  By signing my name below, I, Marlenkaya Eduardo, Scribe   attest that this documentation has been prepared under the direction and in the presence of Eyal Ray MD.  "

## 2024-05-21 NOTE — TELEPHONE ENCOUNTER
Patient was in this morning, she said that you told her about a diet, or gave her something, she cannot remember what it was or what you said for her to do.  I can call her if you would like to give me the information  Thank you

## 2024-07-02 ENCOUNTER — LAB (OUTPATIENT)
Dept: LAB | Facility: LAB | Age: 89
End: 2024-07-02
Payer: MEDICARE

## 2024-07-02 DIAGNOSIS — E78.49 OTHER HYPERLIPIDEMIA: ICD-10-CM

## 2024-07-02 DIAGNOSIS — E55.9 VITAMIN D DEFICIENCY: ICD-10-CM

## 2024-07-02 DIAGNOSIS — M06.372: ICD-10-CM

## 2024-07-02 DIAGNOSIS — E03.9 ACQUIRED HYPOTHYROIDISM: ICD-10-CM

## 2024-07-02 DIAGNOSIS — I10 PRIMARY HYPERTENSION: ICD-10-CM

## 2024-07-02 DIAGNOSIS — N18.31 STAGE 3A CHRONIC KIDNEY DISEASE (MULTI): ICD-10-CM

## 2024-07-02 DIAGNOSIS — N31.9 NEUROGENIC BLADDER: ICD-10-CM

## 2024-07-02 DIAGNOSIS — M81.0 POSTMENOPAUSAL BONE LOSS: ICD-10-CM

## 2024-07-02 DIAGNOSIS — K57.30 DIVERTICULOSIS OF LARGE INTESTINE WITHOUT HEMORRHAGE: ICD-10-CM

## 2024-07-02 DIAGNOSIS — R41.3 MEMORY LOSS: ICD-10-CM

## 2024-07-02 LAB
25(OH)D3 SERPL-MCNC: 43 NG/ML (ref 30–100)
ALBUMIN SERPL BCP-MCNC: 4 G/DL (ref 3.4–5)
ALP SERPL-CCNC: 60 U/L (ref 33–136)
ALT SERPL W P-5'-P-CCNC: 14 U/L (ref 7–45)
ANION GAP SERPL CALC-SCNC: 10 MMOL/L (ref 10–20)
AST SERPL W P-5'-P-CCNC: 21 U/L (ref 9–39)
BASOPHILS # BLD AUTO: 0.06 X10*3/UL (ref 0–0.1)
BASOPHILS NFR BLD AUTO: 0.9 %
BILIRUB SERPL-MCNC: 1.1 MG/DL (ref 0–1.2)
BUN SERPL-MCNC: 18 MG/DL (ref 6–23)
CALCIUM SERPL-MCNC: 9.2 MG/DL (ref 8.6–10.3)
CHLORIDE SERPL-SCNC: 107 MMOL/L (ref 98–107)
CHOLEST SERPL-MCNC: 137 MG/DL (ref 0–199)
CHOLESTEROL/HDL RATIO: 2.4
CO2 SERPL-SCNC: 28 MMOL/L (ref 21–32)
CREAT SERPL-MCNC: 0.84 MG/DL (ref 0.5–1.05)
CREAT UR-MCNC: 84.7 MG/DL (ref 20–320)
EGFRCR SERPLBLD CKD-EPI 2021: 67 ML/MIN/1.73M*2
EOSINOPHIL # BLD AUTO: 0.23 X10*3/UL (ref 0–0.4)
EOSINOPHIL NFR BLD AUTO: 3.6 %
ERYTHROCYTE [DISTWIDTH] IN BLOOD BY AUTOMATED COUNT: 13.3 % (ref 11.5–14.5)
GLUCOSE SERPL-MCNC: 90 MG/DL (ref 74–99)
HCT VFR BLD AUTO: 43.5 % (ref 36–46)
HDLC SERPL-MCNC: 56.7 MG/DL
HGB BLD-MCNC: 14.3 G/DL (ref 12–16)
IMM GRANULOCYTES # BLD AUTO: 0.02 X10*3/UL (ref 0–0.5)
IMM GRANULOCYTES NFR BLD AUTO: 0.3 % (ref 0–0.9)
LDLC SERPL CALC-MCNC: 57 MG/DL
LYMPHOCYTES # BLD AUTO: 1.93 X10*3/UL (ref 0.8–3)
LYMPHOCYTES NFR BLD AUTO: 30.4 %
MCH RBC QN AUTO: 31.7 PG (ref 26–34)
MCHC RBC AUTO-ENTMCNC: 32.9 G/DL (ref 32–36)
MCV RBC AUTO: 97 FL (ref 80–100)
MICROALBUMIN UR-MCNC: 48.5 MG/L
MICROALBUMIN/CREAT UR: 57.3 UG/MG CREAT
MONOCYTES # BLD AUTO: 0.61 X10*3/UL (ref 0.05–0.8)
MONOCYTES NFR BLD AUTO: 9.6 %
NEUTROPHILS # BLD AUTO: 3.49 X10*3/UL (ref 1.6–5.5)
NEUTROPHILS NFR BLD AUTO: 55.2 %
NON HDL CHOLESTEROL: 80 MG/DL (ref 0–149)
NRBC BLD-RTO: 0 /100 WBCS (ref 0–0)
PLATELET # BLD AUTO: 174 X10*3/UL (ref 150–450)
POTASSIUM SERPL-SCNC: 4 MMOL/L (ref 3.5–5.3)
PROT SERPL-MCNC: 5.9 G/DL (ref 6.4–8.2)
RBC # BLD AUTO: 4.51 X10*6/UL (ref 4–5.2)
SODIUM SERPL-SCNC: 141 MMOL/L (ref 136–145)
TRIGL SERPL-MCNC: 117 MG/DL (ref 0–149)
TSH SERPL-ACNC: 3.18 MIU/L (ref 0.44–3.98)
VLDL: 23 MG/DL (ref 0–40)
WBC # BLD AUTO: 6.3 X10*3/UL (ref 4.4–11.3)

## 2024-07-02 PROCEDURE — 84443 ASSAY THYROID STIM HORMONE: CPT

## 2024-07-02 PROCEDURE — 82306 VITAMIN D 25 HYDROXY: CPT

## 2024-07-02 PROCEDURE — 82570 ASSAY OF URINE CREATININE: CPT

## 2024-07-02 PROCEDURE — 82043 UR ALBUMIN QUANTITATIVE: CPT

## 2024-07-02 PROCEDURE — 85025 COMPLETE CBC W/AUTO DIFF WBC: CPT

## 2024-07-02 PROCEDURE — 80053 COMPREHEN METABOLIC PANEL: CPT

## 2024-07-02 PROCEDURE — 36415 COLL VENOUS BLD VENIPUNCTURE: CPT

## 2024-07-02 PROCEDURE — 80061 LIPID PANEL: CPT

## 2024-07-05 ENCOUNTER — APPOINTMENT (OUTPATIENT)
Dept: PRIMARY CARE | Facility: CLINIC | Age: 89
End: 2024-07-05
Payer: MEDICARE

## 2024-07-05 VITALS
RESPIRATION RATE: 18 BRPM | HEIGHT: 63 IN | BODY MASS INDEX: 28.88 KG/M2 | DIASTOLIC BLOOD PRESSURE: 72 MMHG | SYSTOLIC BLOOD PRESSURE: 118 MMHG | TEMPERATURE: 97.3 F | WEIGHT: 163 LBS | HEART RATE: 75 BPM | OXYGEN SATURATION: 96 %

## 2024-07-05 DIAGNOSIS — E44.1 MILD PROTEIN-CALORIE MALNUTRITION (MULTI): ICD-10-CM

## 2024-07-05 DIAGNOSIS — Z00.00 ROUTINE GENERAL MEDICAL EXAMINATION AT HEALTH CARE FACILITY: ICD-10-CM

## 2024-07-05 DIAGNOSIS — Z00.00 ROUTINE ADULT HEALTH MAINTENANCE: ICD-10-CM

## 2024-07-05 DIAGNOSIS — K21.9 GASTROESOPHAGEAL REFLUX DISEASE, UNSPECIFIED WHETHER ESOPHAGITIS PRESENT: ICD-10-CM

## 2024-07-05 DIAGNOSIS — K21.9 GERD WITHOUT ESOPHAGITIS: ICD-10-CM

## 2024-07-05 DIAGNOSIS — E03.9 HYPOTHYROIDISM, UNSPECIFIED TYPE: ICD-10-CM

## 2024-07-05 DIAGNOSIS — R15.9 INCONTINENCE OF FECES, UNSPECIFIED FECAL INCONTINENCE TYPE: ICD-10-CM

## 2024-07-05 DIAGNOSIS — E78.49 OTHER HYPERLIPIDEMIA: ICD-10-CM

## 2024-07-05 DIAGNOSIS — N18.31 STAGE 3A CHRONIC KIDNEY DISEASE (MULTI): Primary | ICD-10-CM

## 2024-07-05 DIAGNOSIS — M81.0 POSTMENOPAUSAL BONE LOSS: ICD-10-CM

## 2024-07-05 DIAGNOSIS — I10 PRIMARY HYPERTENSION: ICD-10-CM

## 2024-07-05 PROBLEM — H10.013 ACUTE FOLLICULAR CONJUNCTIVITIS, BILATERAL: Status: RESOLVED | Noted: 2024-07-05 | Resolved: 2024-07-05

## 2024-07-05 PROCEDURE — G0439 PPPS, SUBSEQ VISIT: HCPCS | Performed by: INTERNAL MEDICINE

## 2024-07-05 PROCEDURE — 3074F SYST BP LT 130 MM HG: CPT | Performed by: INTERNAL MEDICINE

## 2024-07-05 PROCEDURE — 3078F DIAST BP <80 MM HG: CPT | Performed by: INTERNAL MEDICINE

## 2024-07-05 PROCEDURE — 1160F RVW MEDS BY RX/DR IN RCRD: CPT | Performed by: INTERNAL MEDICINE

## 2024-07-05 PROCEDURE — 99215 OFFICE O/P EST HI 40 MIN: CPT | Performed by: INTERNAL MEDICINE

## 2024-07-05 PROCEDURE — 1126F AMNT PAIN NOTED NONE PRSNT: CPT | Performed by: INTERNAL MEDICINE

## 2024-07-05 PROCEDURE — 1036F TOBACCO NON-USER: CPT | Performed by: INTERNAL MEDICINE

## 2024-07-05 PROCEDURE — 1170F FXNL STATUS ASSESSED: CPT | Performed by: INTERNAL MEDICINE

## 2024-07-05 PROCEDURE — 1159F MED LIST DOCD IN RCRD: CPT | Performed by: INTERNAL MEDICINE

## 2024-07-05 RX ORDER — ROSUVASTATIN CALCIUM 10 MG/1
10 TABLET, COATED ORAL NIGHTLY
Qty: 90 TABLET | Refills: 3 | Status: SHIPPED | OUTPATIENT
Start: 2024-07-05 | End: 2025-07-05

## 2024-07-05 RX ORDER — PANTOPRAZOLE SODIUM 40 MG/1
40 TABLET, DELAYED RELEASE ORAL DAILY
Qty: 30 TABLET | Refills: 5 | Status: SHIPPED | OUTPATIENT
Start: 2024-07-05 | End: 2025-01-01

## 2024-07-05 RX ORDER — LEVOTHYROXINE SODIUM 75 UG/1
75 TABLET ORAL DAILY
Qty: 90 TABLET | Refills: 1 | Status: SHIPPED | OUTPATIENT
Start: 2024-07-05

## 2024-07-05 RX ORDER — FAMOTIDINE 40 MG/1
40 TABLET, FILM COATED ORAL DAILY
Qty: 90 TABLET | Refills: 1 | Status: CANCELLED | OUTPATIENT
Start: 2024-07-05 | End: 2025-07-05

## 2024-07-05 SDOH — ECONOMIC STABILITY: FOOD INSECURITY: WITHIN THE PAST 12 MONTHS, YOU WORRIED THAT YOUR FOOD WOULD RUN OUT BEFORE YOU GOT MONEY TO BUY MORE.: NEVER TRUE

## 2024-07-05 SDOH — ECONOMIC STABILITY: FOOD INSECURITY: WITHIN THE PAST 12 MONTHS, THE FOOD YOU BOUGHT JUST DIDN'T LAST AND YOU DIDN'T HAVE MONEY TO GET MORE.: NEVER TRUE

## 2024-07-05 ASSESSMENT — LIFESTYLE VARIABLES
HOW MANY STANDARD DRINKS CONTAINING ALCOHOL DO YOU HAVE ON A TYPICAL DAY: PATIENT DOES NOT DRINK
HOW OFTEN DO YOU HAVE A DRINK CONTAINING ALCOHOL: NEVER
SKIP TO QUESTIONS 9-10: 1
AUDIT-C TOTAL SCORE: 0
HOW OFTEN DO YOU HAVE SIX OR MORE DRINKS ON ONE OCCASION: NEVER

## 2024-07-05 ASSESSMENT — ENCOUNTER SYMPTOMS
LOSS OF SENSATION IN FEET: 0
OCCASIONAL FEELINGS OF UNSTEADINESS: 1
DEPRESSION: 0

## 2024-07-05 ASSESSMENT — ACTIVITIES OF DAILY LIVING (ADL)
DOING_HOUSEWORK: INDEPENDENT
DRESSING: INDEPENDENT
TAKING_MEDICATION: INDEPENDENT
BATHING: INDEPENDENT
GROCERY_SHOPPING: INDEPENDENT
MANAGING_FINANCES: INDEPENDENT

## 2024-07-05 ASSESSMENT — PATIENT HEALTH QUESTIONNAIRE - PHQ9
SUM OF ALL RESPONSES TO PHQ9 QUESTIONS 1 & 2: 0
1. LITTLE INTEREST OR PLEASURE IN DOING THINGS: NOT AT ALL
2. FEELING DOWN, DEPRESSED OR HOPELESS: NOT AT ALL

## 2024-07-05 ASSESSMENT — PAIN SCALES - GENERAL: PAINLEVEL: 0-NO PAIN

## 2024-07-05 NOTE — ASSESSMENT & PLAN NOTE
BP is low, patient gets lightheaded at times, stop the Cardura, continue metoprolol, recheck BP in 4 weeks.

## 2024-07-05 NOTE — ASSESSMENT & PLAN NOTE
GFR recently was over 60, will continue to monitor, stopped the doxazocin since the patient gets lightheadedness

## 2024-07-05 NOTE — ASSESSMENT & PLAN NOTE
Continue Kegel exercises, encourage the patient to try them, follow up with urogynecology, encourage some weight loss

## 2024-07-05 NOTE — PROGRESS NOTES
"Subjective   Reason for Visit: Consuelo Andres is an 89 y.o. female here for a Medicare Wellness visit.     Past Medical, Surgical, and Family History reviewed and updated in chart.    Reviewed all medications by prescribing practitioner or clinical pharmacist (such as prescriptions, OTCs, herbal therapies and supplements) and documented in the medical record.    HPI    Follow up and Medicare wellness exam today:    Patient was seen at [facility] ER with complaints of abdominal pain. Patient was diagnosed with diverticulitis.  Patient had colonoscopy completed. She still gets terrible taste at night, about 3 hours after eating dinner. This occurs prior to going to bed or after lying down, patient states.       urinary frequency/incontinence: H/o uterine prolapse. The patient states that she can go from seated to standing and notices that she leaks some urine. Patient was advised to do Kegel exercises, she still forgets to do them. She has noticed decreased urination. She also does not empty her bowels completely. She does get some rectal leaking.   Patient does see Dr Ray, she is supposed to follow up with Dr Ray for possible bladder stimulation device, she states. Patient states that she does not want to have \"needles\" in her back      memory issues: memory issues have not changed now, she is stable.      right hand numbness: patient only notes numbness in the hand when she wakes up in the morning. She also has thenar numbness as well. She has no complaints at this time.     HTN/MV prolapse: Currently on metoprolol and Cardura, she does note some lightheadedness.       patient loses balance easily, balance is not good, \"sometimes I feel like I'm floating\". Patient has not fallen recently     hyperlipidemia: patient takes rosuvastatin     hypothyroidism: takes levothyroxine 75 mcg  daily.    Recent thyroid ultrasound with multiple nodules appreciated. Right sided 25 mm TIRADS 3 nodule that appears to have increased " "in size since 2019 US. Patient had needle aspiration completed, patient does not remember having the procedure completed      KATHRYN: patient states that she had sleep study completed at [place]. She does not wear a CPAP. She has an appointment scheduled with sleep medicine       Patient Care Team:  Gian Traylor MD as PCP - General (Internal Medicine)  Debo Noel MD as PCP - [employer] PCP  Keila Dennis MD as Consulting Physician (Cardiology)     Review of Systems    otherwise negative aside from what was mentioned above in HPI.     Objective   Vitals:  /72 (BP Location: Right arm, Patient Position: Sitting, BP Cuff Size: Adult)   Pulse 75   Temp 36.3 °C (97.3 °F)   Resp 18   Ht 1.6 m (5' 3\")   Wt 73.9 kg (163 lb)   SpO2 96%   BMI 28.87 kg/m²       Physical Exam    CONSTITUTIONAL - well nourished, well developed, looks like stated age, in no acute distress, not ill-appearing, and not tired appearing    SKIN - normal skin color and pigmentation, normal skin turgor without rash, lesions, or nodules visualized on exposed skin  HEAD - no trauma, normocephalic  EYES - extraocular muscles are intact, and normal external exam, wears glasses  ENT - auricles are intact  NECK - supple without rigidity, no neck mass was observed, no obvious thyromegaly  CHEST - clear to auscultation, no wheezing, no crackles and no rales, good effort  CARDIAC - regular rate and regular rhythm, no skipped beats, no murmur, no carotid bruits noted  ABDOMEN - slightly distended,soft, not tender, limited exam since patient is sitting upright. No palpable masses  EXTREMITIES - no edema, no deformities  NEUROLOGICAL - normal gait, normal balance, normal motor, no ataxia, DTRs equal and symmetrical; alert, oriented and no focal signs  PSYCHIATRIC - alert, pleasant and cordial, age-appropriate, she does c/o of some memory loss  IMMUNOLOGIC - no cervical lymphadenopathy     Assessment/Plan   Problem List " Items Addressed This Visit       Fecal incontinence    Current Assessment & Plan     Continue Kegel exercises, encourage the patient to try them, follow up with urogynecology, encourage some weight loss         Relevant Orders    CBC and Auto Differential    Comprehensive Metabolic Panel    Gastroesophageal reflux disease    Relevant Orders    CBC and Auto Differential    Comprehensive Metabolic Panel    GERD without esophagitis    Current Assessment & Plan     Trial of pantoprazole to see if this reduces GERD symptoms         Relevant Medications    pantoprazole (ProtoNix) 40 mg EC tablet    Other Relevant Orders    CBC and Auto Differential    Comprehensive Metabolic Panel    Hyperlipidemia    Current Assessment & Plan     Controlled on rosuvastatin, no med changes, recheck labs in 6 months         Relevant Medications    rosuvastatin (Crestor) 10 mg tablet    Other Relevant Orders    CBC and Auto Differential    Comprehensive Metabolic Panel    Lipid Panel    Hypertension    Current Assessment & Plan     BP is low, patient gets lightheaded at times, stop the Cardura, continue metoprolol, recheck BP in 4 weeks.          Relevant Orders    Albumin-Creatinine Ratio, Urine Random    CBC and Auto Differential    Comprehensive Metabolic Panel    Hypothyroidism    Current Assessment & Plan     TSH is stable, no changes are noted         Relevant Medications    levothyroxine (Synthroid, Levoxyl) 75 mcg tablet    Other Relevant Orders    CBC and Auto Differential    Comprehensive Metabolic Panel    TSH with reflex to Free T4 if abnormal    Mild protein-calorie malnutrition (Multi)    Relevant Orders    CBC and Auto Differential    Comprehensive Metabolic Panel    Postmenopausal bone loss    Relevant Orders    Vitamin D 25-Hydroxy,Total (for eval of Vitamin D levels)    Routine general medical examination at health care facility    Current Assessment & Plan     It appears that the patient has had shingrix vaccine. RSV  vaccine ordered.          Relevant Orders    CBC and Auto Differential    Comprehensive Metabolic Panel    Stage 3a chronic kidney disease (Multi) - Primary    Current Assessment & Plan     GFR recently was over 60, will continue to monitor, stopped the doxazocin since the patient gets lightheadedness           Other Visit Diagnoses       Routine adult health maintenance        Relevant Medications    respiratory syncytial virus, RSV, vaccine, adjuvanted, age 60y+ (Arexvy) 120 mcg/0.5 mL suspension for reconstitution    Other Relevant Orders    CBC and Auto Differential    Comprehensive Metabolic Panel          Medicare wellness exam completed today, follow up in 6 months  Recheck BP in 4 weeks since the Cardura has been stopped, do the Kegel exercises and follow up with urogynecology as scheduled. Check labs prior to next visit

## 2024-07-16 ENCOUNTER — APPOINTMENT (OUTPATIENT)
Dept: UROLOGY | Facility: CLINIC | Age: 89
End: 2024-07-16
Payer: MEDICARE

## 2024-07-16 DIAGNOSIS — R33.9 INCOMPLETE EMPTYING OF BLADDER: Primary | ICD-10-CM

## 2024-07-16 DIAGNOSIS — R10.2 PELVIC PAIN: ICD-10-CM

## 2024-07-16 DIAGNOSIS — M62.89 PELVIC FLOOR DYSFUNCTION: ICD-10-CM

## 2024-07-16 PROCEDURE — 99214 OFFICE O/P EST MOD 30 MIN: CPT | Performed by: STUDENT IN AN ORGANIZED HEALTH CARE EDUCATION/TRAINING PROGRAM

## 2024-07-16 RX ORDER — DOXAZOSIN 4 MG/1
4 TABLET ORAL NIGHTLY
Qty: 30 TABLET | Refills: 11 | Status: SHIPPED | OUTPATIENT
Start: 2024-07-16 | End: 2025-07-16

## 2024-07-16 NOTE — PROGRESS NOTES
HISTORY OF PRESENT ILLNESS:  Consuelo Andres is a 89 y.o. female who presents today for a follow up visit. She reports that had a balloon test done because she was having some leakage and bowel issues. She reports her bowel issues have resolved. She has not been taking her bladder medications. She states that she is having an issue with her stomach.          Past Medical History  She has a past medical history of Acute follicular conjunctivitis, bilateral (07/05/2024), Anesthesia of skin, Diverticulitis (01/13/2024), Personal history of diseases of the blood and blood-forming organs and certain disorders involving the immune mechanism, Personal history of other medical treatment (05/06/2021), and Unspecified cataract.    Surgical History  She has a past surgical history that includes Bladder surgery (09/07/2017); Breast biopsy (09/07/2017); Cardiac surgery (09/07/2017); Foot surgery (09/07/2017); Hysterectomy (09/07/2017); Other surgical history (09/05/2022); Other surgical history (03/06/2020); Other surgical history (11/09/2019); and Other surgical history (11/09/2019).     Social History  She reports that she has never smoked. She has never used smokeless tobacco. She reports that she does not drink alcohol and does not use drugs.    Family History  Family History   Problem Relation Name Age of Onset    Other (cva) Mother      Coronary artery disease Father      Coronary artery disease Brother      Other (malignant neoplasm) Brother      Stomach cancer Brother          Allergies  Penicillins, Caffeine, Ciprofloxacin, Donepezil, Fexofenadine, Lanolin, Lipase-protease-amylase, Methen-m.blue-s.phos-phsal-hyo, Methylprednisolone, Mirabegron, Naproxen, Norfloxacin, Rivastigmine, Clindamycin, Nitrofurantoin, Ofloxacin, Quinolones, Sulfamethoxazole, and Trimethoprim      A comprehensive 10+ review of systems was negative except for: see hpi                          PHYSICAL EXAMINATION:  BP Readings from Last 3  "Encounters:   07/05/24 118/72   05/14/24 124/62   03/19/24 134/82      Wt Readings from Last 3 Encounters:   07/05/24 73.9 kg (163 lb)   05/14/24 71.2 kg (157 lb)   03/19/24 70.8 kg (156 lb)      BMI: Estimated body mass index is 28.87 kg/m² as calculated from the following:    Height as of 7/5/24: 1.6 m (5' 3\").    Weight as of 7/5/24: 73.9 kg (163 lb).  BSA: Estimated body surface area is 1.81 meters squared as calculated from the following:    Height as of 7/5/24: 1.6 m (5' 3\").    Weight as of 7/5/24: 73.9 kg (163 lb).  HEENT: Normocephalic, atraumatic, PER EOMI, nonicteric, trachea normal, thyroid normal, oropharynx normal.  CARDIAC: regular rate & rhythm, S1 & S2 normal.  No heaves, thrills, gallops or murmurs.  LUNGS: Clear to auscultation, no spinal or CV tenderness.  EXTREMITIES: No evidence of cyanosis, clubbing or edema.               Assessment:  89-year-old with history of prior sling here with urinary incontinence     UI:  UDS showed likely underactive detrusor   flomax made symptoms worse  Myrbetriq did not help nor did gemtesa  Cystoscopy negative     pne, 8/29/23, did well with this, but opted to hold off     Finished PTNS, no improvement      Recommended Metamucil daily     Rx Cardura       If no improvement will try to schedule     Referral to pelvic floor therapy          GUSM:  continue estradiol         Follow up in 3 to 4 months       All questions and concerns were answered and addressed.  The patient expressed understanding and agrees with the plan.     Eyal Ray MD    Scribe Attestation  By signing my name below, I, Gonzalo Cunha   attest that this documentation has been prepared under the direction and in the presence of Eyal Ray MD.  "

## 2024-08-02 ENCOUNTER — EVALUATION (OUTPATIENT)
Dept: PHYSICAL THERAPY | Facility: HOSPITAL | Age: 89
End: 2024-08-02
Payer: MEDICARE

## 2024-08-02 DIAGNOSIS — M62.89 PELVIC FLOOR DYSFUNCTION: ICD-10-CM

## 2024-08-02 DIAGNOSIS — N39.41 URGE INCONTINENCE OF URINE: Primary | ICD-10-CM

## 2024-08-02 DIAGNOSIS — M62.81 MUSCLE WEAKNESS: ICD-10-CM

## 2024-08-02 PROCEDURE — 97535 SELF CARE MNGMENT TRAINING: CPT | Mod: GP | Performed by: PHYSICAL THERAPIST

## 2024-08-02 PROCEDURE — 97162 PT EVAL MOD COMPLEX 30 MIN: CPT | Mod: GP | Performed by: PHYSICAL THERAPIST

## 2024-08-02 ASSESSMENT — ENCOUNTER SYMPTOMS
LOSS OF SENSATION IN FEET: 0
OCCASIONAL FEELINGS OF UNSTEADINESS: 0
DEPRESSION: 0

## 2024-08-02 ASSESSMENT — PAIN - FUNCTIONAL ASSESSMENT: PAIN_FUNCTIONAL_ASSESSMENT: 0-10

## 2024-08-02 ASSESSMENT — PAIN DESCRIPTION - DESCRIPTORS: DESCRIPTORS: ACHING;SHARP

## 2024-08-02 ASSESSMENT — PATIENT HEALTH QUESTIONNAIRE - PHQ9
1. LITTLE INTEREST OR PLEASURE IN DOING THINGS: NOT AT ALL
SUM OF ALL RESPONSES TO PHQ9 QUESTIONS 1 AND 2: 0
2. FEELING DOWN, DEPRESSED OR HOPELESS: NOT AT ALL

## 2024-08-02 ASSESSMENT — PAIN SCALES - GENERAL: PAINLEVEL_OUTOF10: 0 - NO PAIN

## 2024-08-02 NOTE — PROGRESS NOTES
Physical Therapy    PELVIC FLOOR EVALUATION AND TREATMENT    Name: Consuelo Andres  MRN: [id number]  : 1934  Today's Date: 2024  Visit Number: 1  PT Evaluation Time Entry  PT Evaluation (Moderate) Time Entry: 35  PT Therapeutic Procedures Time Entry  Self-Care/Home Mgmt Trainin                 Assessment:   PT Assessment  PT Assessment Results: Decreased strength, Decreased endurance, Decreased coordination, Pain  Rehab Prognosis: Good    Pt presents with GUSM symptoms of urinary frequency and urge incontinence, bowel leakage, likely PF muscle weakness and poor coordination, decreased endurance, abdominal pain and poor bladder habits.    Recommend skilled PF physical therapy to address the above deficits that affect functional activities of daily living.   Skilled intervention is needed to train and provide proper technique, educate patient on progression, risks, prognosis, and provide adequate feedback for technique correction and implementation.     Discharge planned upon achievement of goals or reaching maximum benefit from skilled physical therapy services.    *Patient care transferred to Licha Garza, PT at [facility]    Plan:   OP PT Plan  Treatment/Interventions: Biofeedback, Education/ Instruction, Electrical stimulation, Manual therapy, Neuromuscular re-education, Self care/ home management, Therapeutic exercises, Therapeutic activities  PT Plan: Skilled PT  PT Frequency: 1 time per week  Duration: 12 weeks  Certification Period Start Date: 24  Certification Period End Date: 10/31/24  Number of Treatments Authorized: med nec  Rehab Potential: Good  Plan of Care Agreement: Patient    Interventions: Issued handout: Overcoming the urge; squatty potty info    Current Problem:  Problem List Items Addressed This Visit             ICD-10-CM    Urge incontinence of urine - Primary N39.41    Relevant Orders    Follow Up In Physical Therapy    Pelvic floor dysfunction M62.89     Relevant Orders    Follow Up In Physical Therapy    Muscle weakness M62.81     Subjective   General  Urinary frequency and incontinence that is worse in the morning and she'll have to void every 45-60 min, but then it can lengthen in duration to every 2 hours in the afternoon. She also reports having problems with her bowels but (-) colonoscopy. She states that she will go to urinate and notice that she has had some bowel loss as well and she isn't sure when this is happening. Urinary incontinence occurs with only urgency and if she doesn't manually hold her vagina on her way to the bathroom or while she is undressing to use the bathroom. Pt reports having intermittent abdominal pain that have been going on for years since she had pancreatitis and diverticulitis. The pain is affected by what she eats, but if she sits and relaxes then it will go away.    Precautions  Precautions  STEADI Fall Risk Score (The score of 4 or more indicates an increased risk of falling): 4  Precautions Comment: fall risk     Pain  Pain Assessment: 0-10  0-10 (Numeric) Pain Score: 0 - No pain  Pain Type: Chronic pain  Pain Location: Abdomen  Pain Descriptors: Aching, Sharp    Objective   PELVIC HISTORY:  Chief Complaint/Description of Symptoms: Urinary frequency, urgency, and incontinence, bowel incontinence.   HPI: Hx sling w/mesh performed for pelvic organ prolapse, pt states being on morphine for the pancreatitis and was told it has affected her memory.  Home Environment/Social Factors/Occupation: Pt lives alone; retired    PELVIC PAIN:  Pelvic Pain  Pain when emptying bladder: No  Pain when urine or clothing touches the skin over the vaginal area or wiping: No   Description: Abdominal pain, discomfort, cramping  Location: Abdomen  Duration: Variable length  Frequency: Intermittent  History of back pain: Yes    EXERCISE:  Do you do Kegels? Sometimes   Current exercise regime: No    BLADDER:  Bladder  Excessive urinary urgency:  always  Daytime voiding frequency : >10  Nighttime voiding frequency: 2-3 times  Unintentional urine loss: almost daily  Leakage occurs with: urge  Leakage amount: large, moderate  Protection: maxi  Daily fluid intake: >30oz  Daily caffeine intake: 8oz  Difficulty initiating urine flow: not at all  Slow/Weak urine stream: occasionally, frequently  Able to void completely: No  Frequent UTI's: No  Diagnostic tests: UDS showed likely underactive detrusor   flomax made symptoms worse  Myrbetriq did not help nor did gemtesa  Cystoscopy negative  BOWEL:  Bowel  Unintentional stool loss: almost daily  Bowel movement frequency: twice a day  Frequent diarrhea: No  Constipation/straining,incomplete bowel movement: No  Taking stool softeners or fiber supplements: Yes  Excessive Bowel Urgency: Yes    EXTERNAL PALPATION:  Iliopsoas: (+) Tightness   Adductor: (+) Tightness   Glutes: (-) Pain/Tightness   Abdominals: (-) Pain/Tightness     POSTURE: Flattened lumbar lordosis, increased thoracic kyphosis      MMT R/L:  Hip flex: 4,4+/5 North  Hip IR: 4/5 North  Hip ER: 4+/5 North  Hip add: 5/5  Hip abd: 4+/5  Hip ext: 4/5 North  TA: 3+/5    ROM R/L:  Lumbar flex: Decreased, flattened lordosis  Lumbar ext: Decreased  Hip flex: WFL  Hip IR: Decreased w/tightness  Hip ER: Slightly decreased, WFL    FLEXIBILITY R/L:  Hamstrings: mild-mod tightness North  Psoas: Tightness North    OUTCOMES MEASURE:  Female NIH-CPSI: 25 (Pain 7, Urinary 8, QOL 10)    Education:   Outpatient Education  Individual(s) Educated: Patient  Education Provided: Body Mechanics, POC  Risk and Benefits Discussed with Patient/Caregiver/Other: yes  Patient/Caregiver Demonstrated Understanding: yes  Plan of Care Discussed and Agreed Upon: yes  Patient Response to Education: Patient/Caregiver Verbalized Understanding of Information      Careplan Goals:  Active       Urge incontinence & bowel incontinence       1) Pt to report reduction in bowel incontinence by >40% to reduce her  skin irritation       Start:  08/02/24    Expected End:  10/25/24            2) Pt to report 50% reduction in urinary incontinence episodes to indicate increased bladder control       Start:  08/02/24    Expected End:  10/25/24            3) Pt to demonstrate good bladder habits to report decreased urinary frequency to <10x/day       Start:  08/02/24    Expected End:  10/25/24            4) Functional Outcome NIH CPSI score improves by >50% raw score to indicate improvement in subscales       Start:  08/02/24    Expected End:  10/25/24                  Laura Gray, PT

## 2024-08-06 ENCOUNTER — APPOINTMENT (OUTPATIENT)
Dept: PRIMARY CARE | Facility: CLINIC | Age: 89
End: 2024-08-06
Payer: MEDICARE

## 2024-08-06 VITALS
WEIGHT: 163 LBS | SYSTOLIC BLOOD PRESSURE: 116 MMHG | HEART RATE: 88 BPM | DIASTOLIC BLOOD PRESSURE: 64 MMHG | BODY MASS INDEX: 28.87 KG/M2

## 2024-08-06 DIAGNOSIS — I10 PRIMARY HYPERTENSION: ICD-10-CM

## 2024-08-08 ENCOUNTER — APPOINTMENT (OUTPATIENT)
Dept: PHYSICAL THERAPY | Facility: CLINIC | Age: 89
End: 2024-08-08
Payer: MEDICARE

## 2024-08-09 DIAGNOSIS — K21.9 GERD WITHOUT ESOPHAGITIS: ICD-10-CM

## 2024-08-09 RX ORDER — FAMOTIDINE 40 MG/1
40 TABLET, FILM COATED ORAL DAILY
Qty: 90 TABLET | Refills: 1 | Status: SHIPPED | OUTPATIENT
Start: 2024-08-09

## 2024-08-14 ENCOUNTER — APPOINTMENT (OUTPATIENT)
Dept: PRIMARY CARE | Facility: CLINIC | Age: 89
End: 2024-08-14
Payer: MEDICARE

## 2024-08-14 VITALS
BODY MASS INDEX: 28.86 KG/M2 | SYSTOLIC BLOOD PRESSURE: 123 MMHG | OXYGEN SATURATION: 99 % | TEMPERATURE: 97.3 F | HEIGHT: 63 IN | WEIGHT: 162.9 LBS | DIASTOLIC BLOOD PRESSURE: 77 MMHG | RESPIRATION RATE: 16 BRPM | HEART RATE: 63 BPM

## 2024-08-14 DIAGNOSIS — I05.9 MITRAL VALVE DISEASE: ICD-10-CM

## 2024-08-14 DIAGNOSIS — K86.1 OTHER CHRONIC PANCREATITIS (MULTI): ICD-10-CM

## 2024-08-14 DIAGNOSIS — M79.604 PAIN OF RIGHT LOWER EXTREMITY: ICD-10-CM

## 2024-08-14 DIAGNOSIS — K21.9 GERD WITHOUT ESOPHAGITIS: Primary | ICD-10-CM

## 2024-08-14 DIAGNOSIS — R06.09 EXERTIONAL DYSPNEA: ICD-10-CM

## 2024-08-14 PROCEDURE — 3074F SYST BP LT 130 MM HG: CPT | Performed by: INTERNAL MEDICINE

## 2024-08-14 PROCEDURE — 1036F TOBACCO NON-USER: CPT | Performed by: INTERNAL MEDICINE

## 2024-08-14 PROCEDURE — 1159F MED LIST DOCD IN RCRD: CPT | Performed by: INTERNAL MEDICINE

## 2024-08-14 PROCEDURE — 99215 OFFICE O/P EST HI 40 MIN: CPT | Performed by: INTERNAL MEDICINE

## 2024-08-14 PROCEDURE — 3078F DIAST BP <80 MM HG: CPT | Performed by: INTERNAL MEDICINE

## 2024-08-14 RX ORDER — OMEPRAZOLE 40 MG/1
40 CAPSULE, DELAYED RELEASE ORAL
Qty: 90 CAPSULE | Refills: 1 | Status: CANCELLED | OUTPATIENT
Start: 2024-08-14

## 2024-08-14 RX ORDER — PANTOPRAZOLE SODIUM 40 MG/1
40 TABLET, DELAYED RELEASE ORAL DAILY
Qty: 90 TABLET | Refills: 2 | Status: SHIPPED | OUTPATIENT
Start: 2024-08-14 | End: 2025-02-10

## 2024-08-14 RX ORDER — OMEPRAZOLE 40 MG/1
40 CAPSULE, DELAYED RELEASE ORAL
COMMUNITY
End: 2024-08-14 | Stop reason: WASHOUT

## 2024-08-14 SDOH — ECONOMIC STABILITY: FOOD INSECURITY: WITHIN THE PAST 12 MONTHS, YOU WORRIED THAT YOUR FOOD WOULD RUN OUT BEFORE YOU GOT MONEY TO BUY MORE.: NEVER TRUE

## 2024-08-14 SDOH — ECONOMIC STABILITY: FOOD INSECURITY: WITHIN THE PAST 12 MONTHS, THE FOOD YOU BOUGHT JUST DIDN'T LAST AND YOU DIDN'T HAVE MONEY TO GET MORE.: NEVER TRUE

## 2024-08-14 ASSESSMENT — LIFESTYLE VARIABLES
HOW OFTEN DO YOU HAVE SIX OR MORE DRINKS ON ONE OCCASION: NEVER
HOW MANY STANDARD DRINKS CONTAINING ALCOHOL DO YOU HAVE ON A TYPICAL DAY: PATIENT DOES NOT DRINK
SKIP TO QUESTIONS 9-10: 1
AUDIT-C TOTAL SCORE: 0
HOW OFTEN DO YOU HAVE A DRINK CONTAINING ALCOHOL: NEVER

## 2024-08-14 ASSESSMENT — PATIENT HEALTH QUESTIONNAIRE - PHQ9
2. FEELING DOWN, DEPRESSED OR HOPELESS: NOT AT ALL
1. LITTLE INTEREST OR PLEASURE IN DOING THINGS: NOT AT ALL
SUM OF ALL RESPONSES TO PHQ9 QUESTIONS 1 & 2: 0

## 2024-08-14 NOTE — PROGRESS NOTES
"Chief Complaint/HPI:    Follow up: patient was seen in 7/2024    urinary frequency/incontinence: H/o uterine prolapse. The patient states that she can go from seated to standing and notices that she leaks some urine. Patient was advised to do Kegel exercises, she still forgets to do them. She has noticed decreased urination. She also does not empty her bowels completely. She does get some rectal leaking.   Patient does see Dr Ray, she is supposed to follow up with Dr Ray for possible bladder stimulation device, she states. Patient states that she does not want to have \"needles\" in her back , patient has never discussed Botox option, (not certain if this is a reasonable option)     memory issues: memory issues have not changed now, she is stable. Patient has current memory issues. She denies any prior memory issues.     right hand numbness: patient only notes numbness in the hand when she wakes up in the morning. She also has thenar numbness as well. Dr Garcia ordered a cock up wrist splint. The splint is effective.      HTN/MV prolapse: Currently on metoprolol and Cardura, she does note some lightheadedness.  She did not stop the Cardura.  Patient still gets recurrent chest pain and shortness of breath with exertion. Patient does see Dr Dennis.       patient loses balance easily, balance is not good, \"sometimes I feel like I'm floating\". Patient has not fallen recently, patient does note that the room \"spins\" when she initially lies down.      hyperlipidemia: patient takes rosuvastatin     hypothyroidism: takes levothyroxine 75 mcg  daily.    Recent thyroid ultrasound with multiple nodules appreciated. Right sided 25 mm TIRADS 3 nodule that appears to have increased in size since 2019 US. Patient had needle aspiration completed, patient does not remember having the procedure completed      Sleep issues:  patient states that she had sleep study completed at [place]. She does not wear a CPAP. She did see sleep " medicine, she apparently no longer needs to wear CPAP       ROS otherwise negative aside from what was mentioned above in HPI.      Patient Active Problem List   Diagnosis    Abdominal pain, LLQ (left lower quadrant)    Abnormal CT scan, colon    Abrasion    Accidental fall    Acute diverticulitis    Acute generalized abdominal pain    Acute maxillary sinusitis    Acute nasopharyngitis    Acute pancreatitis ([facility])    Acute UTI    Adhesive capsulitis of shoulder    Allergic contact dermatitis due to adhesives    Anemia    Anxiety    Arthritis    Bilateral impacted cerumen    Carpal tunnel syndrome of right wrist    Cataract, left    Clindamycin adverse reaction    Contact dermatitis due to plant    Diaphragmatic hernia    Diarrhea, unspecified    Disorder of bone and cartilage    Disorder of skin or subcutaneous tissue    Diverticulosis of large intestine    Dysuria    Ecchymosis    Fecal incontinence    Flatulence, eructation, and gas pain    GERD without esophagitis    Hair loss    Hematuria    Hyperlipidemia    Hypertension    Hypothyroidism    Incomplete bladder emptying    Increased frequency of urination    Laceration of right hand without foreign body    Left knee pain    Lesion of spleen    Memory loss    Midline cystocele    Mitral valve disease    Limb cramp    Myalgia    Neck pain    Atypical chest pain    Nocturia    Nontoxic single thyroid nodule    Obstructive sleep apnea    Osteoarthritis of knee    Pain in both feet    Pain in joint involving pelvic region and thigh    Pain of right lower extremity    Poison ivy    Postmenopausal bone loss    Primary dysthymia    Rectocele, female    Restless legs syndrome    Right hand paresthesia    Antibiotic-induced allergic rash    Skin sensation disturbance    Swallowing disorder    Swelling of left lower extremity    Unsteady gait    URI (upper respiratory infection)    Vision changes    Vitamin D deficiency    Watery stools    Dermatitis    Unknown cause of  injury    Colloid thyroid nodule    Acute sinusitis    Acute viral syndrome    Contact dermatitis, allergic    Rheumatoid nodule, left ankle and foot (Multi)    Facial rash    Skin lesions    Cystitis    Nipple pain    Other chest pain    Impacted cerumen of right ear    Cortical cataract of right eye    Diverticulosis    Gastroesophageal reflux disease    Muscle cramps    Muscle weakness of lower extremity    Nocturnal leg cramps    Mitral valve prolapse    Actinic keratosis    Carcinoma in situ of skin    Inflamed seborrheic keratosis    Neurogenic bladder    Photoaged skin    Urethral stricture    Functional disorder of bladder    Breast tenderness    Dry eye syndrome, bilateral    Monocular diplopia of left eye    Dehydration    History of anemia    Injury of free lower extremity    Routine general medical examination at health care facility    Stage 3a chronic kidney disease (Multi)    Mild protein-calorie malnutrition (Multi)    Urge incontinence of urine    Pelvic floor dysfunction    Muscle weakness    Other chronic pancreatitis (Multi)    Exertional dyspnea         Past Medical History:   Diagnosis Date    Acute follicular conjunctivitis, bilateral 07/05/2024    Anesthesia of skin     Numbness and tingling in right hand    Diverticulitis 01/13/2024    Personal history of diseases of the blood and blood-forming organs and certain disorders involving the immune mechanism     History of anemia    Personal history of other medical treatment 05/06/2021    History of screening mammography    Unspecified cataract     Cataracts, bilateral     Past Surgical History:   Procedure Laterality Date    BLADDER SURGERY  09/07/2017    Bladder Surgery    BREAST BIOPSY  09/07/2017    Biopsy Breast Open    CARDIAC SURGERY  09/07/2017    Heart Surgery    FOOT SURGERY  09/07/2017    Foot Surgery    HYSTERECTOMY  09/07/2017    Hysterectomy    OTHER SURGICAL HISTORY  09/05/2022    Uvulopalatopharyngoplasty    OTHER SURGICAL  HISTORY  03/06/2020    Surgery    OTHER SURGICAL HISTORY  11/09/2019    Cardiac catheterization    OTHER SURGICAL HISTORY  11/09/2019    Tonsillectomy     Social History     Social History Narrative    Not on file         ALLERGIES  Penicillins, Caffeine, Ciprofloxacin, Donepezil, Fexofenadine, Lanolin, Lipase-protease-amylase, Methen-m.blue-s.phos-phsal-hyo, Methylprednisolone, Mirabegron, Naproxen, Norfloxacin, Rivastigmine, Clindamycin, Nitrofurantoin, Ofloxacin, Quinolones, Sulfamethoxazole, and Trimethoprim      MEDICATIONS  Current Outpatient Medications on File Prior to Visit   Medication Sig Dispense Refill    cholecalciferol (Vitamin D-3) 25 MCG (1000 UT) tablet Take 1 tablet (1,000 Units) by mouth once daily. 90 tablet 1    levothyroxine (Synthroid, Levoxyl) 75 mcg tablet Take 1 tablet (75 mcg) by mouth once daily. 90 tablet 1    metoprolol succinate XL (Toprol-XL) 25 mg 24 hr tablet Take 1 tablet (25 mg) by mouth once daily. 90 tablet 3    multivitamin (Daily Multi-Vitamin) tablet Take 1 tablet by mouth once daily.      rosuvastatin (Crestor) 10 mg tablet Take 1 tablet (10 mg) by mouth once daily at bedtime. 90 tablet 3    [DISCONTINUED] doxazosin (Cardura) 4 mg tablet Take 1 tablet (4 mg) by mouth once daily at bedtime. 30 tablet 11    [DISCONTINUED] omeprazole (PriLOSEC) 40 mg DR capsule Take 1 capsule (40 mg) by mouth once daily in the morning. Take before meals. Do not crush or chew.      [DISCONTINUED] pantoprazole (ProtoNix) 40 mg EC tablet Take 1 tablet (40 mg) by mouth once daily. Do not crush, chew, or split. 30 tablet 5    [DISCONTINUED] famotidine (Pepcid) 40 mg tablet Take 1 tablet (40 mg) by mouth once daily. 90 tablet 1    [DISCONTINUED] famotidine (Pepcid) 40 mg tablet TAKE 1 TABLET BY MOUTH EVERY DAY (Patient not taking: Reported on 8/14/2024) 90 tablet 1     No current facility-administered medications on file prior to visit.         PHYSICAL EXAM  /77 (BP Location: Left arm,  "Patient Position: Sitting, BP Cuff Size: Adult)   Pulse 63   Temp 36.3 °C (97.3 °F)   Resp 16   Ht 1.6 m (5' 3\")   Wt 73.9 kg (162 lb 14.4 oz)   SpO2 99%   BMI 28.86 kg/m²   Body mass index is 28.86 kg/m².    CONSTITUTIONAL - well nourished, well developed, looks like stated age, in no acute distress, not ill-appearing, and not tired appearing , accompanied by daughter.   SKIN - normal skin color and pigmentation, normal skin turgor without rash, lesions, or nodules visualized on exposed skin  HEAD - no trauma, normocephalic  EYES - extraocular muscles are intact, and normal external exam, wears glasses  ENT - auricles are intact  NECK - supple without rigidity, no neck mass was observed, no obvious thyromegaly  CHEST - clear to auscultation, no wheezing, no crackles and no rales, good effort, diminished breath sounds in the bases only  CARDIAC - regular rate and regular rhythm, no skipped beats, no murmur, no carotid bruits noted  EXTREMITIES - no edema, no deformities  NEUROLOGICAL - normal gait, normal balance, normal motor, no ataxia, DTRs equal and symmetrical; alert, oriented and no focal signs  PSYCHIATRIC - alert, pleasant and cordial, age-appropriate, she does c/o of some memory loss  IMMUNOLOGIC - no cervical lymphadenopathy   MSK: tender over the right anterior leg, no edema noted       ASSESSMENT/PLAN  Problem List Items Addressed This Visit       Exertional dyspnea    Relevant Orders    Transthoracic Echo Complete    XR chest 2 views    GERD without esophagitis - Primary    Relevant Medications    pantoprazole (ProtoNix) 40 mg EC tablet    Mitral valve disease    Relevant Orders    Transthoracic Echo Complete    XR chest 2 views    Other chronic pancreatitis (Multi)    Pain of right lower extremity    Current Assessment & Plan     Check an x ray of the RLE, patient has local pain in the leg         Relevant Orders    XR tibia fibula right 2 views     Check x ray of the right leg, check an ECHO " cardiogram and CXR. Patient does note some SOB with exertion, no leg swelling however    Follow up after testing, follow up with urogynecology for bladder issues    Gian Traylor MD

## 2024-08-15 ENCOUNTER — HOSPITAL ENCOUNTER (OUTPATIENT)
Dept: RADIOLOGY | Facility: CLINIC | Age: 89
Discharge: HOME | End: 2024-08-15
Payer: MEDICARE

## 2024-08-15 DIAGNOSIS — M79.604 PAIN OF RIGHT LOWER EXTREMITY: ICD-10-CM

## 2024-08-15 DIAGNOSIS — I05.9 MITRAL VALVE DISEASE: ICD-10-CM

## 2024-08-15 DIAGNOSIS — R06.09 EXERTIONAL DYSPNEA: ICD-10-CM

## 2024-08-15 PROCEDURE — 71046 X-RAY EXAM CHEST 2 VIEWS: CPT

## 2024-08-15 PROCEDURE — 73590 X-RAY EXAM OF LOWER LEG: CPT | Mod: RT

## 2024-08-20 ENCOUNTER — TELEPHONE (OUTPATIENT)
Dept: PRIMARY CARE | Facility: CLINIC | Age: 89
End: 2024-08-20
Payer: MEDICARE

## 2024-08-20 DIAGNOSIS — L81.9 PIGMENTED SKIN LESION OF UNCERTAIN BEHAVIOR OF HEAD: ICD-10-CM

## 2024-08-20 DIAGNOSIS — D04.9: Primary | ICD-10-CM

## 2024-08-20 NOTE — TELEPHONE ENCOUNTER
Pt would like a referral for a mole that she has on her temple.  It is dark and has grown bigger.  She would like to have it checked out.

## 2024-08-22 ENCOUNTER — HOSPITAL ENCOUNTER (OUTPATIENT)
Dept: CARDIOLOGY | Facility: HOSPITAL | Age: 89
Discharge: HOME | End: 2024-08-22
Payer: MEDICARE

## 2024-08-22 DIAGNOSIS — I05.9 MITRAL VALVE DISEASE: ICD-10-CM

## 2024-08-22 DIAGNOSIS — R06.09 EXERTIONAL DYSPNEA: ICD-10-CM

## 2024-08-22 LAB
AORTIC VALVE MEAN GRADIENT: 3.9 MMHG
AORTIC VALVE PEAK VELOCITY: 1.3 M/S
AV PEAK GRADIENT: 6.8 MMHG
AVA (PEAK VEL): 2.19 CM2
AVA (VTI): 2.16 CM2
EJECTION FRACTION APICAL 4 CHAMBER: 54.2
EJECTION FRACTION: 56 %
LEFT ATRIUM VOLUME AREA LENGTH INDEX BSA: 20.6 ML/M2
LEFT VENTRICLE INTERNAL DIMENSION DIASTOLE: 3.68 CM (ref 3.5–6)
LEFT VENTRICULAR OUTFLOW TRACT DIAMETER: 1.94 CM
LV EJECTION FRACTION BIPLANE: 56 %
MITRAL VALVE E/A RATIO: 0.9
RIGHT VENTRICLE FREE WALL PEAK S': 12.35 CM/S
RIGHT VENTRICLE PEAK SYSTOLIC PRESSURE: 29.9 MMHG
TRICUSPID ANNULAR PLANE SYSTOLIC EXCURSION: 2.8 CM

## 2024-08-22 PROCEDURE — 93306 TTE W/DOPPLER COMPLETE: CPT

## 2024-08-22 PROCEDURE — 93306 TTE W/DOPPLER COMPLETE: CPT | Performed by: INTERNAL MEDICINE

## 2024-09-03 ENCOUNTER — APPOINTMENT (OUTPATIENT)
Dept: RADIOLOGY | Facility: CLINIC | Age: 89
End: 2024-09-03
Payer: MEDICARE

## 2024-09-04 ENCOUNTER — TELEPHONE (OUTPATIENT)
Dept: PRIMARY CARE | Facility: CLINIC | Age: 89
End: 2024-09-04
Payer: MEDICARE

## 2024-09-04 DIAGNOSIS — M79.604 PAIN OF RIGHT LOWER EXTREMITY: Primary | ICD-10-CM

## 2024-09-06 RX ORDER — GABAPENTIN 100 MG/1
100 CAPSULE ORAL 3 TIMES DAILY
Qty: 90 CAPSULE | Refills: 0 | Status: SHIPPED | OUTPATIENT
Start: 2024-09-06 | End: 2025-03-05

## 2024-09-06 NOTE — ADDENDUM NOTE
"Chief Complaint   Patient presents with     Pain     L knee and foot pain since this morning, patient fell        Initial /85 (BP Location: Right arm, Patient Position: Sitting, Cuff Size: Adult Large)  Pulse 90  Temp 97.7  F (36.5  C) (Oral)  LMP 06/30/2016 Estimated body mass index is 40.08 kg/(m^2) as calculated from the following:    Height as of 7/21/17: 5' 6.75\" (1.695 m).    Weight as of 10/6/17: 254 lb (115.2 kg).  Medication Reconciliation: complete  Pancho Harding MA    " Addended by: RAINE BORJAS on: 9/6/2024 01:48 PM     Modules accepted: Orders

## 2024-09-06 NOTE — TELEPHONE ENCOUNTER
Pt stopped by the office to say that she is having right leg pain (mostly in the center of leg) and runs down to her foot.  She recently had x-rays done and would like to know what she can do for her pain.  Her right foot by the end of the day will hurt so much that it is hard to find a comfortable position for her.  Please advise.

## 2024-09-17 ENCOUNTER — TELEPHONE (OUTPATIENT)
Dept: PRIMARY CARE | Facility: CLINIC | Age: 89
End: 2024-09-17
Payer: MEDICARE

## 2024-09-17 NOTE — TELEPHONE ENCOUNTER
Patient called in stating that she is no longer going to be taking the Gabapentin you have prescribed. She said it makes her too tired to be able to function, and was losing her balance. She last took this on Friday Septeember 13th. She just wanted to let you know.

## 2024-09-27 ENCOUNTER — OFFICE VISIT (OUTPATIENT)
Dept: URGENT CARE | Age: 89
End: 2024-09-27
Payer: MEDICARE

## 2024-09-27 VITALS
DIASTOLIC BLOOD PRESSURE: 77 MMHG | OXYGEN SATURATION: 98 % | BODY MASS INDEX: 28.35 KG/M2 | WEIGHT: 160 LBS | HEIGHT: 63 IN | TEMPERATURE: 97.8 F | SYSTOLIC BLOOD PRESSURE: 151 MMHG | HEART RATE: 68 BPM

## 2024-09-27 DIAGNOSIS — L25.5 PLANT DERMATITIS: Primary | ICD-10-CM

## 2024-09-27 PROCEDURE — 1159F MED LIST DOCD IN RCRD: CPT | Performed by: PHYSICIAN ASSISTANT

## 2024-09-27 PROCEDURE — 99202 OFFICE O/P NEW SF 15 MIN: CPT | Performed by: PHYSICIAN ASSISTANT

## 2024-09-27 PROCEDURE — 3077F SYST BP >= 140 MM HG: CPT | Performed by: PHYSICIAN ASSISTANT

## 2024-09-27 PROCEDURE — 3078F DIAST BP <80 MM HG: CPT | Performed by: PHYSICIAN ASSISTANT

## 2024-09-27 RX ORDER — TRIAMCINOLONE ACETONIDE 1 MG/G
CREAM TOPICAL
Qty: 30 G | Refills: 0 | Status: SHIPPED | OUTPATIENT
Start: 2024-09-27 | End: 2025-01-25

## 2024-09-27 ASSESSMENT — ENCOUNTER SYMPTOMS
NAUSEA: 0
CHILLS: 0
FEVER: 0
WOUND: 0
VOMITING: 0

## 2024-09-27 NOTE — PROGRESS NOTES
Subjective   Patient ID: Consuelo Andres is a 89 y.o. female. They present today with a chief complaint of Rash (Rash b/l aarms).    History of Present Illness  Patient presents today for rash. She states this started about 2 weeks ago with a red spot to her right wrist. She states she then developed other red itchy spots to left forearm and posterior upper arm.  She states this developed after pulling weeds in her neighbor's garden.  DENIES: pain, discharge, fevers, chills. No contacts with a rash. She has been treated with vinegar application with relief.       Rash  Pertinent negatives include no fever or vomiting.       Past Medical History  Allergies as of 09/27/2024 - Reviewed 09/27/2024   Allergen Reaction Noted    Penicillins Anaphylaxis and Hives 04/28/2023    Caffeine Other and Unknown 04/28/2023    Ciprofloxacin Diarrhea 06/12/2023    Donepezil Unknown 04/28/2023    Fexofenadine Unknown 04/28/2023    Lanolin Unknown 01/21/2016    Lipase-protease-amylase Unknown 01/05/2023    Methen-m.blue-s.phos-phsal-hyo Unknown 05/05/2016    Methylprednisolone Unknown 04/28/2023    Mirabegron Unknown 04/28/2023    Naproxen Unknown 04/28/2023    Norfloxacin Other 06/12/2023    Rivastigmine Unknown 04/28/2023    Clindamycin Itching and Rash 04/28/2023    Nitrofurantoin Rash 04/28/2023    Ofloxacin Rash 04/28/2016    Quinolones Hives, Diarrhea, and Rash 04/28/2023    Sulfamethoxazole Rash 04/28/2023    Trimethoprim Rash 04/28/2023       (Not in a hospital admission)       Past Medical History:   Diagnosis Date    Acute follicular conjunctivitis, bilateral 07/05/2024    Anesthesia of skin     Numbness and tingling in right hand    Diverticulitis 01/13/2024    Personal history of diseases of the blood and blood-forming organs and certain disorders involving the immune mechanism     History of anemia    Personal history of other medical treatment 05/06/2021    History of screening mammography    Unspecified cataract      "Cataracts, bilateral       Past Surgical History:   Procedure Laterality Date    BLADDER SURGERY  09/07/2017    Bladder Surgery    BREAST BIOPSY  09/07/2017    Biopsy Breast Open    CARDIAC SURGERY  09/07/2017    Heart Surgery    FOOT SURGERY  09/07/2017    Foot Surgery    HYSTERECTOMY  09/07/2017    Hysterectomy    OTHER SURGICAL HISTORY  09/05/2022    Uvulopalatopharyngoplasty    OTHER SURGICAL HISTORY  03/06/2020    Surgery    OTHER SURGICAL HISTORY  11/09/2019    Cardiac catheterization    OTHER SURGICAL HISTORY  11/09/2019    Tonsillectomy        reports that she has never smoked. She has never used smokeless tobacco. She reports that she does not drink alcohol and does not use drugs.    Review of Systems  Review of Systems   Constitutional:  Negative for chills and fever.   Gastrointestinal:  Negative for nausea and vomiting.   Skin:  Positive for rash. Negative for wound.                                  Objective    Vitals:    09/27/24 1142   BP: 151/77   BP Location: Left arm   Patient Position: Sitting   BP Cuff Size: Adult   Pulse: 68   Temp: 36.6 °C (97.8 °F)   TempSrc: Oral   SpO2: 98%   Weight: 72.6 kg (160 lb)   Height: 1.6 m (5' 3\")     No LMP recorded.    Physical Exam  Vitals and nursing note reviewed.   Constitutional:       General: She is not in acute distress.     Appearance: Normal appearance.   Skin:     Comments: Erythematous macular rash with small areas of scabbing to right radial wrist. There are similar appearing erythematous macular lesions scattered on left upper arm. No areas are TTP, have induration/fluctuance or discharge present.   Neurological:      Mental Status: She is alert.         Procedures    Point of Care Test & Imaging Results from this visit  No results found for this visit on 09/27/24.   No results found.    Diagnostic study results (if any) were reviewed by [facility].    Assessment/Plan   Allergies, medications, history, and pertinent labs/EKGs/Imaging reviewed " by Aide Avendano PA-C.     Medical Decision Making  MDM- History and examination consistent with contact dermatitis, due to exposure to poision ivy or another plant. No evidence of infection or sepsis. Plan is for taper of steroid medications, and symptomatic support at home. Return to clinic or present to ED if symptoms change or worsen. Otherwise follow up with PCP. Patient verbalized understanding and agrees with plan.       Orders and Diagnoses  Diagnoses and all orders for this visit:  Plant dermatitis  -     triamcinolone (Kenalog) 0.1 % cream; Apply to affected area 1-2 times daily as needed. Avoid face and groin.      Medical Admin Record      Patient disposition: Home    Electronically signed by [facility]  11:59 AM

## 2024-10-17 ENCOUNTER — CONSULT (OUTPATIENT)
Facility: HOSPITAL | Age: 89
End: 2024-10-17
Payer: MEDICARE

## 2024-10-17 ENCOUNTER — HOSPITAL ENCOUNTER (OUTPATIENT)
Dept: RADIOLOGY | Facility: HOSPITAL | Age: 89
Discharge: HOME | End: 2024-10-17
Payer: MEDICARE

## 2024-10-17 VITALS — RESPIRATION RATE: 19 BRPM | DIASTOLIC BLOOD PRESSURE: 79 MMHG | HEART RATE: 91 BPM | SYSTOLIC BLOOD PRESSURE: 134 MMHG

## 2024-10-17 DIAGNOSIS — M79.604 PAIN IN BOTH LOWER EXTREMITIES: ICD-10-CM

## 2024-10-17 DIAGNOSIS — M79.604 PAIN OF RIGHT LOWER EXTREMITY: ICD-10-CM

## 2024-10-17 DIAGNOSIS — M79.605 PAIN IN BOTH LOWER EXTREMITIES: Primary | ICD-10-CM

## 2024-10-17 DIAGNOSIS — M79.604 PAIN IN BOTH LOWER EXTREMITIES: Primary | ICD-10-CM

## 2024-10-17 DIAGNOSIS — M79.605 PAIN IN BOTH LOWER EXTREMITIES: ICD-10-CM

## 2024-10-17 PROCEDURE — 72110 X-RAY EXAM L-2 SPINE 4/>VWS: CPT | Performed by: RADIOLOGY

## 2024-10-17 PROCEDURE — 99214 OFFICE O/P EST MOD 30 MIN: CPT | Performed by: PHYSICAL MEDICINE & REHABILITATION

## 2024-10-17 PROCEDURE — 72110 X-RAY EXAM L-2 SPINE 4/>VWS: CPT

## 2024-10-17 RX ORDER — LIDOCAINE 40 MG/G
CREAM TOPICAL 4 TIMES DAILY PRN
Qty: 113 G | Refills: 3 | Status: SHIPPED | OUTPATIENT
Start: 2024-10-17 | End: 2025-10-17

## 2024-10-17 RX ORDER — DICLOFENAC SODIUM 10 MG/G
4 GEL TOPICAL 4 TIMES DAILY PRN
Qty: 100 G | Refills: 2 | Status: SHIPPED | OUTPATIENT
Start: 2024-10-17

## 2024-10-17 ASSESSMENT — PAIN SCALES - GENERAL: PAINLEVEL_OUTOF10: 0-NO PAIN

## 2024-10-17 NOTE — PROGRESS NOTES
Physical Medicine and Rehabilitation MSK Consult  10/17/24       Chief Complaint:  Low back pain     HPI:  Consuelo Andres is a  89 y.o. F who presents to the clinic today  for evaluation of leg  Onset: R lateral side and bottom of foot and left medial lower leg pain. Started few months ago.  Location: mostly R lateral lower leg  Radiation: as above  No pain in thighs, intermittent low back pain   Quality: dull pain and sharp and comes and goes  Duration: intermittent  Aggravating factors:  sewing and pushing down on pedal and being busy  Walking, activities, bending, working in yard    Alleviating factors: not sure  Severity: 0 comes and goes  Numbness/Tingling: Yes - R arm  Bowel or bladder incontinence: Yes - chronic bladder  Pt's current medication regimen includes: gabapentin 100 mg for few weeks wo relief, not sure if had side effects     Treatment to date:  Physical therapy: No  Medications taken to date for this complaint include the following:   none  Prior injections:Hilda spine, But had left cmc injection        Does have numbness in the hand and arm on the R,.   Not sure it helps. Has a splint, two months,. No pain neck or upper arm. Follows w [facility].     Drives   Imaging  Reviewed 10/17/24     Xr tibia 8/2024  FINDINGS:  No fracture dislocation or bone lesion. Ankle joint and knee joint  are normal the extent of visualization. Soft tissues are normal.      IMPRESSION:  No abnormality  Past Medical History:   Diagnosis Date    Acute follicular conjunctivitis, bilateral 07/05/2024    Anesthesia of skin     Numbness and tingling in right hand    Diverticulitis 01/13/2024    Personal history of diseases of the blood and blood-forming organs and certain disorders involving the immune mechanism     History of anemia    Personal history of other medical treatment 05/06/2021    History of screening mammography    Unspecified cataract     Cataracts, bilateral        Past Surgical History:   Procedure  Laterality Date    BLADDER SURGERY  09/07/2017    Bladder Surgery    BREAST BIOPSY  09/07/2017    Biopsy Breast Open    CARDIAC SURGERY  09/07/2017    Heart Surgery    FOOT SURGERY  09/07/2017    Foot Surgery    HYSTERECTOMY  09/07/2017    Hysterectomy    OTHER SURGICAL HISTORY  09/05/2022    Uvulopalatopharyngoplasty    OTHER SURGICAL HISTORY  03/06/2020    Surgery    OTHER SURGICAL HISTORY  11/09/2019    Cardiac catheterization    OTHER SURGICAL HISTORY  11/09/2019    Tonsillectomy        Patient Active Problem List    Diagnosis Date Noted    Other chronic pancreatitis 08/14/2024    Exertional dyspnea 08/14/2024    Pelvic floor dysfunction 08/02/2024    Muscle weakness 08/02/2024    Urge incontinence of urine 05/07/2024    Mild protein-calorie malnutrition (Multi) 02/13/2024    History of anemia 12/18/2023    Injury of free lower extremity 12/18/2023    Routine general medical examination at health care facility 12/18/2023    Stage 3a chronic kidney disease (Multi) 12/18/2023    Breast tenderness 11/04/2023    Dry eye syndrome, bilateral 11/04/2023    Dermatitis 06/12/2023    Unknown cause of injury 06/12/2023    Colloid thyroid nodule 06/12/2023    Acute sinusitis 06/12/2023    Acute viral syndrome 06/12/2023    Contact dermatitis, allergic 06/12/2023    Rheumatoid nodule, left ankle and foot (Multi) 06/12/2023    Facial rash 06/12/2023    Skin lesions 06/12/2023    Cystitis 06/12/2023    Nipple pain 06/12/2023    Other chest pain 06/12/2023    Impacted cerumen of right ear 06/12/2023    Cortical cataract of right eye 06/12/2023    Diverticulosis 06/12/2023    Gastroesophageal reflux disease 06/12/2023    Muscle cramps 06/12/2023    Muscle weakness of lower extremity 06/12/2023    Nocturnal leg cramps 06/12/2023    Mitral valve prolapse 06/12/2023    Abdominal pain, LLQ (left lower quadrant) 04/28/2023    Abnormal CT scan, colon 04/28/2023    Abrasion 04/28/2023    Accidental fall 04/28/2023    Acute  diverticulitis 04/28/2023    Acute generalized abdominal pain 04/28/2023    Acute maxillary sinusitis 04/28/2023    Acute nasopharyngitis 04/28/2023    Acute pancreatitis 04/28/2023    Acute UTI 04/28/2023    Adhesive capsulitis of shoulder 04/28/2023    Allergic contact dermatitis due to adhesives 04/28/2023    Anemia 04/28/2023    Anxiety 04/28/2023    Arthritis 04/28/2023    Bilateral impacted cerumen 04/28/2023    Carpal tunnel syndrome of right wrist 04/28/2023    Cataract, left 04/28/2023    Clindamycin adverse reaction 04/28/2023    Contact dermatitis due to plant 04/28/2023    Diaphragmatic hernia 04/28/2023    Diarrhea, unspecified 04/28/2023    Disorder of bone and cartilage 04/28/2023    Disorder of skin or subcutaneous tissue 04/28/2023    Diverticulosis of large intestine 04/28/2023    Dysuria 04/28/2023    Ecchymosis 04/28/2023    Fecal incontinence 04/28/2023    Flatulence, eructation, and gas pain 04/28/2023    GERD without esophagitis 04/28/2023    Hair loss 04/28/2023    Hematuria 04/28/2023    Hyperlipidemia 04/28/2023    Hypertension 04/28/2023    Hypothyroidism 04/28/2023    Incomplete bladder emptying 04/28/2023    Increased frequency of urination 04/28/2023    Laceration of right hand without foreign body 04/28/2023    Left knee pain 04/28/2023    Lesion of spleen 04/28/2023    Memory loss 04/28/2023    Midline cystocele 04/28/2023    Mitral valve disease 04/28/2023    Limb cramp 04/28/2023    Myalgia 04/28/2023    Neck pain 04/28/2023    Atypical chest pain 04/28/2023    Nocturia 04/28/2023    Nontoxic single thyroid nodule 04/28/2023    Obstructive sleep apnea 04/28/2023    Osteoarthritis of knee 04/28/2023    Pain in both feet 04/28/2023    Pain in joint involving pelvic region and thigh 04/28/2023    Pain of right lower extremity 04/28/2023    Poison ivy 04/28/2023    Postmenopausal bone loss 04/28/2023    Primary dysthymia 04/28/2023    Rectocele, female 04/28/2023    Restless legs  syndrome 04/28/2023    Right hand paresthesia 04/28/2023    Antibiotic-induced allergic rash 04/28/2023    Skin sensation disturbance 04/28/2023    Swallowing disorder 04/28/2023    Swelling of left lower extremity 04/28/2023    Unsteady gait 04/28/2023    URI (upper respiratory infection) 04/28/2023    Vision changes 04/28/2023    Vitamin D deficiency 04/28/2023    Watery stools 04/28/2023    Dehydration 05/12/2022    Monocular diplopia of left eye 07/21/2021    Neurogenic bladder 09/21/2015    Urethral stricture 09/21/2015    Functional disorder of bladder 09/02/2015    Carcinoma in situ of skin 01/10/2012    Inflamed seborrheic keratosis 01/10/2012    Actinic keratosis 11/03/2011    Photoaged skin 10/04/2011        Family History   Problem Relation Name Age of Onset    Other (cva) Mother      Coronary artery disease Father      Coronary artery disease Brother      Other (malignant neoplasm) Brother      Stomach cancer Brother          Current Outpatient Medications   Medication Sig Dispense Refill    cholecalciferol (Vitamin D-3) 25 MCG (1000 UT) tablet Take 1 tablet (1,000 Units) by mouth once daily. 90 tablet 1    levothyroxine (Synthroid, Levoxyl) 75 mcg tablet Take 1 tablet (75 mcg) by mouth once daily. 90 tablet 1    metoprolol succinate XL (Toprol-XL) 25 mg 24 hr tablet Take 1 tablet (25 mg) by mouth once daily. 90 tablet 3    multivitamin (Daily Multi-Vitamin) tablet Take 1 tablet by mouth once daily.      pantoprazole (ProtoNix) 40 mg EC tablet Take 1 tablet (40 mg) by mouth once daily. Do not crush, chew, or split. 90 tablet 2    rosuvastatin (Crestor) 10 mg tablet Take 1 tablet (10 mg) by mouth once daily at bedtime. 90 tablet 3    triamcinolone (Kenalog) 0.1 % cream Apply to affected area 1-2 times daily as needed. Avoid face and groin. 30 g 0     No current facility-administered medications for this visit.        Allergies   Allergen Reactions    Penicillins Anaphylaxis and Hives    Caffeine Other and  "Unknown    Ciprofloxacin Diarrhea    Donepezil Unknown     \"Aricept - Alzheimer's\"    Fexofenadine Unknown     \"Antihistamine\"    Lanolin Unknown     \"Sheep's wool\"    burning    Lipase-Protease-Amylase Unknown     \"Digestive enzymes\"    Pt states she can't remember, but that she knows she had a reaction.    Methen-M.Blue-S.Phos-Phsal-Hyo Unknown     \"Relieves discomfort, swelling, pain, frequent urge to urinate\"    Other reaction(s): Other (See Comments)   Pain in stomach,difficulty urinating,headache    Methylprednisolone Unknown    Mirabegron Unknown     \"Myrbetriq - Overactive bladder\"    Naproxen Unknown    Norfloxacin Other     \"Antibiotic\"    Rivastigmine Unknown     \"Dementia medication\"    Clindamycin Itching and Rash    Nitrofurantoin Rash    Ofloxacin Rash    Quinolones Hives, Diarrhea and Rash     \"Antibiotic\"    Sulfamethoxazole Rash    Trimethoprim Rash        Social History     Socioeconomic History    Marital status:    Tobacco Use    Smoking status: Never    Smokeless tobacco: Never   Vaping Use    Vaping status: Never Used   Substance and Sexual Activity    Alcohol use: Not Currently     Comment: back in her 20s    Drug use: Never     Social Drivers of Health     Food Insecurity: No Food Insecurity (8/14/2024)    Hunger Vital Sign     Worried About Running Out of Food in the Last Year: Never true     Ran Out of Food in the Last Year: Never true   Lives alone     Review of Systems:  Constitutional:  Denies fever or chills, malaise, weight changes.   Eyes:  Denies change in visual acuity   HENT:  Denies nasal congestion or sore throat   Respiratory:  Denies cough or shortness of breath   Cardiovascular:  Denies chest pain or edema   GI:  Denies abdominal pain, nausea, vomiting, bloody stools or diarrhea   :  Denies dysuria   Integument:  Denies rash   Neurologic:  As per HPI  MSK: Per above HPI  Endocrine:  Denies polyuria or polydipsia   Lymphatic:  Denies swollen glands   Psychiatric:  " Denies depression or anxiety            PHYSICAL EXAM:  /79 (BP Location: Left arm, Patient Position: Sitting)   Pulse 91   Resp 19   LMP  (LMP Unknown)     General:  NAD, well developed, F      Psychiatric: appropriate mood & affect.   Cardiovascular:  Normal pedal pulses; no LE edema.  Respiratory:  Normal rate; unlabored breathing.  Skin:  Intact; no erythema; no ecchymosis or rash.  Lymphatic:  No lymphadenopathy or lymphedema.  NEURO:  Alert and appropriate. Speech fluent, conversing appropriately.   Motor:    Rt: HF 5/5, KE 5/5, KF 5/5, DF 5/5, EHL 5/5, PF 5/5.    Lt: HF 5/5, KE 5/5, KF 5/5, DF 5/5, EHL 5/5, PF 5/5.  Sensation:     Light touch: intact in the b/l L3-S1 dermatomes.    PP: intact in the b/l L3-S1 dermatomes  Reflexes:     Right:  patellar 2+, achilles 2+,     Left: patellar 2+, achilles 2+,     Babinski's downgoing b/l; no clonus  Gait: antalgic    MSK:  Inspection reveals no evidence of a pelvic obliqity   Spinal range of motion: Flexion to 30° degrees, Extension of 10 degrees.  There is mild tenderness over the paraspinals bl.   Special tests:    Straight leg raise: -bl    Slump test: -bl    Facet loading: -bl          Impression: Consuelo Andres is a 89 y.o. F w pmh of hlp, gerd presenting with distal LE pain that could be neurogenic in nature thought difficult to replicate on exam.  Plan:  Orders Placed This Encounter   Procedures    XR lumbar spine complete 4+ views    Referral to Physical Therapy    1. Xr l spine  2. PT for core rom hep  3. Lidocaine cream prn  4. Voltaren gel prn  5. Gabapentin made her too drowsy  6. Renal insufficiency would try and avoid oral nsaids    Fu 4 weeks       Thank you for the consultation.     Anne Streeter MD  Physical Medicine and Rehabilitation

## 2024-10-29 ENCOUNTER — APPOINTMENT (OUTPATIENT)
Dept: UROLOGY | Facility: CLINIC | Age: 89
End: 2024-10-29
Payer: MEDICARE

## 2024-11-08 ENCOUNTER — EVALUATION (OUTPATIENT)
Dept: PHYSICAL THERAPY | Facility: HOSPITAL | Age: 89
End: 2024-11-08
Payer: MEDICARE

## 2024-11-08 DIAGNOSIS — M54.50 CHRONIC BILATERAL LOW BACK PAIN WITHOUT SCIATICA: ICD-10-CM

## 2024-11-08 DIAGNOSIS — G89.29 CHRONIC BILATERAL LOW BACK PAIN WITHOUT SCIATICA: ICD-10-CM

## 2024-11-08 DIAGNOSIS — M79.604 PAIN OF RIGHT LOWER EXTREMITY: Primary | ICD-10-CM

## 2024-11-08 DIAGNOSIS — R26.89 IMPAIRED GAIT AND MOBILITY: ICD-10-CM

## 2024-11-08 PROCEDURE — 97110 THERAPEUTIC EXERCISES: CPT | Mod: GP

## 2024-11-08 PROCEDURE — 97161 PT EVAL LOW COMPLEX 20 MIN: CPT | Mod: GP

## 2024-11-08 ASSESSMENT — ENCOUNTER SYMPTOMS
OCCASIONAL FEELINGS OF UNSTEADINESS: 0
LOSS OF SENSATION IN FEET: 0
DEPRESSION: 0

## 2024-11-08 ASSESSMENT — PAIN - FUNCTIONAL ASSESSMENT: PAIN_FUNCTIONAL_ASSESSMENT: 0-10

## 2024-11-08 ASSESSMENT — PAIN DESCRIPTION - DESCRIPTORS: DESCRIPTORS: ACHING;SORE;SHARP

## 2024-11-08 ASSESSMENT — PAIN SCALES - GENERAL: PAINLEVEL_OUTOF10: 4

## 2024-11-08 NOTE — PROGRESS NOTES
"Physical Therapy    Physical Therapy Evaluation    Patient Name: Consuelo Andres  MRN: [id number]  : 1934  Referring Physician: Anne Son  Today's Date: 2024  Time Calculation  Start Time: 1345  Stop Time: 1431  Time Calculation (min): 46 min  PT Evaluation Time Entry  PT Evaluation (Low) Time Entry: 35  PT Therapeutic Procedures Time Entry  Therapeutic Exercise Time Entry: 11  Auth Visit Dates: 10/17/24-10/17/25  Visit: #1    Current Problem  Problem List Items Addressed This Visit             ICD-10-CM    Pain of right lower extremity - Primary M79.604    Relevant Orders    Follow Up In Physical Therapy    Chronic bilateral low back pain without sciatica M54.50, G89.29    Relevant Orders    Follow Up In Physical Therapy    Impaired gait and mobility R26.89    Relevant Orders    Follow Up In Physical Therapy          SUBJECTIVE  Subjective   Pt's name and  were confirmed this date. Pt is a 89 year old F reporting to initial physical therapy evaluation for RLE pain that began around the beginning of the year with pt reporting blaming it on medication that has side effects of joint pain. Pain in lateral aspect of R knee radiating inferiorly to dorsal aspect of R foot. Pt states she also has trouble with back. Xrays 10/17/24 with results as followed: \"The multilevel spondylosis and moderate facet disease worse in the lower lumbar spine. Minimal retrolisthesis L1 on L2 and L2 on L3\".  1 week ago on Monday, 10/28, pt notes she had 1 day of anshu LE pins and needles with dizziness. Pt states she has since changed her medication and has not had any incidence of it since. This is leading to difficulty with yard work, stair negotiation, and increased activity without pain. Patient states that their goal is to reduce anshu LE pain with physical therapy intervention. Prior level of function: Lives alone in a split level house with a yard. 6 steps to upstairs and 7 stairs to downstairs. "     Precautions  Precautions  STEADI Fall Risk Score (The score of 4 or more indicates an increased risk of falling): 2  Medical Precautions:  (L TKA spring 2023)       Pain  Pain Assessment: 0-10  0-10 (Numeric) Pain Score: 4  Pain Type: Chronic pain  Pain Location: Leg  Pain Orientation: Right  Pain Descriptors: Aching, Sore, Sharp      OBJECTIVE:  Lower Extremity Strength:  LE strength not listed below is WNL  MMT 5/5 max  LEFT RIGHT   Hip Flexion 5 5   Hip Extension 4 4   Hip Abduction 5 4   Hip Adduction 5 5   Knee Extension 4 4   Knee Flexion 5 4   Ankle DF 5 5   Ankle PF 5 5     LE AROM:   MMT 5/5 max  LEFT RIGHT   Knee Extension 0 0   Knee Flexion 130 degrees 130 degrees   90-90 Hamstring 55 degrees 55 degrees     Joint mobility North patella mobilizations grade 1-3. Some resistance R>L.     Gait mechanics: Trunk flexion. Intermittent R antalgic gait pattern. Minimal hip flexion and knee extension north.     Palpation TTP at R sided mid IT band.     Single Leg Raise: 1x5 RLE. Pt reports some aching pointing at right quad tendon noting feeling muscles working.     Bridges: x1 with RLE cramp in hamstring.     Sit to Stand: Pushes up with north UEs on mat/chair. Antalgic gait pattern RLE when initially stands to walk with pt noting increased pain in low back compared to RLE.     Special tests: RLE   Varus: -   Valgus: -    Outcome Measure:  Other Measures  Lower Extremity Funtional Score (LEFS): 58/80     TREATMENT: HEP performed in session and issued for home. See below.   EXERCISES       Date              VISIT # # # # #    REPS REPS REPS REPS          Nustep              Shuttle  DLP       Shuttle SLP       Shuttle TR/HR              Qhip Flexion       Qhip Abduction       Qhip Extension       Qhip  TKE              Trunk Flexion with SB       Trunk Flexion + Rotation with SB       DKTC with SB       LTR with SB       Piriformis Stretch        RB stretch       HS stretch              PPT  PPT ABD/ADD  PPT with  march       Bridges       Quad set + SLR              Step ups FW       Step ups lateral              Manual               HEP         HEP  Access Code: [email or web address]  URL: [email or web address]  Date: 11/08/2024  Prepared by: Jacquelyn Marin  Exercises  - Supine Lower Trunk Rotation  - 1 x daily - 7 x weekly - 3 sets - 10 reps - 3-5 seconds hold  - Standing 'L' Stretch at Counter  - 1 x daily - 7 x weekly - 3 sets - 10 reps - 10-15 seconds hold  - Hooklying Single Knee to Chest Stretch  - 1 x daily - 7 x weekly - 3 sets - 10 reps    ASSESSEMENT  PT Assessment  PT Assessment Results: Decreased strength, Decreased endurance, Decreased mobility, Pain  Rehab Prognosis: Fair  Strengths: Ability to acquire knowledge, Access to adaptive/assistive products, Attitude of self, Capable of completing ADLs semi/independent, Insight into problems, Premorbid level of function, Support of extended family/friends, Support of social community, Support of Rastafari community  Pt would benefit from skilled physical therapy to address the deficits listed above in order to improve pt's anshu LE strength and low back mobility to reduce compensatory strategies at low back due to pain to improve functional mobility and quality of life. Pt would benefit from aquatic PT in order to progress interventions in warm therapeutic pool to reduce load on joints and reduce stiffness.     EDUCATION  Outpatient Education  Individual(s) Educated: Patient  Education Provided: Anatomy, Body Mechanics, Home Exercise Program, POC  Risk and Benefits Discussed with Patient/Caregiver/Other: yes  Patient/Caregiver Demonstrated Understanding: yes  Plan of Care Discussed and Agreed Upon: yes  Patient Response to Education: Patient/Caregiver Verbalized Understanding of Information, Patient/Caregiver Performed Return Demonstration of Exercises/Activities, Patient/Caregiver Asked Appropriate Questions  Education Comment: Pt educated on role of PT and  progression through POC. Educated pt on benefits of aquatic PT and therapeutic exercies. Educated pt through HEP interventions and purpose for each with pt reporting and demonstrating good understanding at this time.    PLAN  Treatment/Interventions: Education/ Instruction, Manual therapy, Mechanical traction, Neuromuscular re-education, Therapeutic activities, Therapeutic exercises, Ultrasound, Gait training, Aquatic therapy  PT Plan: Skilled PT  PT Frequency: 2 times per week  Duration: 6-8 weeks (from IE 11/8/24)  Onset Date: 11/08/24  Certification Period Start Date: 10/17/24  Certification Period End Date: 10/17/25  Rehab Potential: Fair  Plan of Care Agreement: Patient    Goals:  Active       PT Problem       Patient will ambulate greater than or equal to 600 foot distance for community ambulation without increase in knee pain from baseline to demonstrate improvement in activity tolerance.       Start:  11/11/24    Expected End:  02/08/25            Patient will achieve right hip abduction and knee flexion strength of 5/5 to improve functional mobility for yard work and household chores       Start:  11/11/24    Expected End:  02/08/25            Patient will demonstrate independence in home program for support of progression       Start:  11/11/24    Expected End:  02/08/25            Patient will report pain of less than or equal to 2/10 over two treatment sessions demonstrating a reduction of overall pain       Start:  11/11/24    Expected End:  02/08/25

## 2024-11-15 RX ORDER — DOXAZOSIN 4 MG/1
4 TABLET ORAL NIGHTLY
COMMUNITY
Start: 2024-10-12

## 2024-11-19 ENCOUNTER — APPOINTMENT (OUTPATIENT)
Dept: CARDIOLOGY | Facility: CLINIC | Age: 89
End: 2024-11-19
Payer: MEDICARE

## 2024-11-19 VITALS
WEIGHT: 163 LBS | HEART RATE: 74 BPM | HEIGHT: 63 IN | DIASTOLIC BLOOD PRESSURE: 66 MMHG | BODY MASS INDEX: 28.88 KG/M2 | SYSTOLIC BLOOD PRESSURE: 102 MMHG

## 2024-11-19 DIAGNOSIS — E78.49 OTHER HYPERLIPIDEMIA: ICD-10-CM

## 2024-11-19 DIAGNOSIS — I34.1 MITRAL VALVE PROLAPSE: Primary | ICD-10-CM

## 2024-11-19 DIAGNOSIS — I10 HYPERTENSION, UNSPECIFIED TYPE: ICD-10-CM

## 2024-11-19 DIAGNOSIS — I05.9 MITRAL VALVE DISEASE: ICD-10-CM

## 2024-11-19 PROCEDURE — 3078F DIAST BP <80 MM HG: CPT | Performed by: INTERNAL MEDICINE

## 2024-11-19 PROCEDURE — 99214 OFFICE O/P EST MOD 30 MIN: CPT | Performed by: INTERNAL MEDICINE

## 2024-11-19 PROCEDURE — 3074F SYST BP LT 130 MM HG: CPT | Performed by: INTERNAL MEDICINE

## 2024-11-19 PROCEDURE — 93005 ELECTROCARDIOGRAM TRACING: CPT | Performed by: INTERNAL MEDICINE

## 2024-11-19 PROCEDURE — 1159F MED LIST DOCD IN RCRD: CPT | Performed by: INTERNAL MEDICINE

## 2024-11-19 PROCEDURE — 1036F TOBACCO NON-USER: CPT | Performed by: INTERNAL MEDICINE

## 2024-11-19 PROCEDURE — 93010 ELECTROCARDIOGRAM REPORT: CPT | Performed by: INTERNAL MEDICINE

## 2024-11-19 RX ORDER — METOPROLOL SUCCINATE 25 MG/1
25 TABLET, EXTENDED RELEASE ORAL DAILY
Qty: 90 TABLET | Refills: 3 | Status: SHIPPED | OUTPATIENT
Start: 2024-11-19 | End: 2025-11-19

## 2024-11-19 NOTE — PROGRESS NOTES
Chief Complaint:   No chief complaint on file.     History Of Present Illness:    Consuelo Andres is a 89 y.o. female presenting for annual follow up.  H/o HTN, DL, mitral valve prolapse, KATHRYN - has not used her CPAP for many years.     Consuelo tells me that her PCP Dr. WATSON ordered a TTE due to her report of balance problems / dizziness, and b/c of her h/o MVP.  TTE did not demonstrate a clear cause of dizziness or imbalance, and no mention of MVP.  She tells me she had an adverse reaction to rosuvastatin and pantoprazole after she had a bad episode of dizziness - since being off of these two her dizziness has improved.    No current complaint chest pain/pressure.  No significant LE edema.  She does occasionally have some pain in her RLE (located in the outer side of her lower leg), which comes and goes for the last 2 months - does not recall any trauma recently to her leg.  No heart fluttering or syncope/presyncope.       ROS:  The remainder of the review of systems was obtained, as was negative as pertains to the chief complaint.     Prior CV Testing Reviewed:  7/2024:  K 4.0  Cr 0.84  ALT 14  AST 21    7/2024:  Tchol 137  LDL 57    HDL 56.7    TTE 8/2024:  CONCLUSIONS:   1. The left ventricular systolic function is normal, with a Galo's biplane calculated ejection fraction of 56%.   2. Spectral Doppler shows an impaired relaxation pattern of left ventricular diastolic filling.   3. There is normal right ventricular global systolic function.  MV mildly thickened, mild mitralannular calcification, mild MR - no mention of MV prolapse    TTE 5/12/22:   1. The left ventricular systolic function is normal with a 60-65% estimated ejection fraction.  2. Spectral Doppler shows an impaired relaxation pattern of left ventricular diastolic filling.  3. There is a moderate pleural effusion.     NM Cardiac stress test 3/21/22:  1. Normal stress myocardial perfusion imaging in response to  pharmacologic stress.  2.  "Well-maintained left ventricular function.     Last Recorded Vitals:  Vitals:    11/19/24 1054   BP: 102/66   Pulse: 74   Weight: 73.9 kg (163 lb)   Height: 1.6 m (5' 3\")       Past Medical History:  She has a past medical history of Acute follicular conjunctivitis, bilateral (07/05/2024), Anesthesia of skin, Diverticulitis (01/13/2024), Personal history of diseases of the blood and blood-forming organs and certain disorders involving the immune mechanism, Personal history of other medical treatment (05/06/2021), and Unspecified cataract.    Past Surgical History:  She has a past surgical history that includes Bladder surgery (09/07/2017); Breast biopsy (09/07/2017); Cardiac surgery (09/07/2017); Foot surgery (09/07/2017); Hysterectomy (09/07/2017); Other surgical history (09/05/2022); Other surgical history (03/06/2020); Other surgical history (11/09/2019); and Other surgical history (11/09/2019).      Social History:  She reports that she has never smoked. She has never used smokeless tobacco. She reports that she does not currently use alcohol. She reports that she does not use drugs.    Family History:  Family History   Problem Relation Name Age of Onset    Other (cva) Mother      Coronary artery disease Father      Coronary artery disease Brother      Other (malignant neoplasm) Brother      Stomach cancer Brother          Allergies:  Penicillins, Caffeine, Ciprofloxacin, Donepezil, Fexofenadine, Lanolin, Lipase-protease-amylase, Methen-m.blue-s.phos-phsal-hyo, Methylprednisolone, Mirabegron, Naproxen, Norfloxacin, Rivastigmine, Clindamycin, Nitrofurantoin, Ofloxacin, Quinolones, Sulfamethoxazole, and Trimethoprim    Outpatient Medications:  Current Outpatient Medications   Medication Instructions    cholecalciferol (VITAMIN D-3) 1,000 Units, oral, Daily    diclofenac sodium (VOLTAREN) 4 g, Topical, 4 times daily PRN    doxazosin (CARDURA) 4 mg, Nightly    levothyroxine (SYNTHROID, LEVOXYL) 75 mcg, oral, Daily "    lidocaine (LMX) 4 % cream Topical, 4 times daily PRN    metoprolol succinate XL (TOPROL-XL) 25 mg, oral, Daily    multivitamin (Daily Multi-Vitamin) tablet 1 tablet, Daily    pantoprazole (PROTONIX) 40 mg, oral, Daily, Do not crush, chew, or split.    rosuvastatin (CRESTOR) 10 mg, oral, Nightly    triamcinolone (Kenalog) 0.1 % cream Apply to affected area 1-2 times daily as needed. Avoid face and groin.       Physical Exam:  Physical Exam  HENT:      Head: Normocephalic.      Mouth/Throat:      Mouth: Mucous membranes are moist.   Eyes:      Extraocular Movements: Extraocular movements intact.   Cardiovascular:      Rate and Rhythm: Normal rate and regular rhythm.   Abdominal:      Palpations: Abdomen is soft.   Musculoskeletal:      Cervical back: Neck supple.   Skin:     General: Skin is warm.   Neurological:      Mental Status: She is alert and oriented to person, place, and time.   Psychiatric:         Mood and Affect: Mood normal.            Last Labs:  CBC -  Lab Results   Component Value Date    WBC 6.3 07/02/2024    HGB 14.3 07/02/2024    HCT 43.5 07/02/2024    MCV 97 07/02/2024     07/02/2024       CMP -  Lab Results   Component Value Date    CALCIUM 9.2 07/02/2024    PROT 5.9 (L) 07/02/2024    ALBUMIN 4.0 07/02/2024    AST 21 07/02/2024    ALT 14 07/02/2024    ALKPHOS 60 07/02/2024    BILITOT 1.1 07/02/2024       LIPID PANEL -   Lab Results   Component Value Date    CHOL 137 07/02/2024    TRIG 117 07/02/2024    HDL 56.7 07/02/2024    CHHDL 2.4 07/02/2024    LDLF 120 (H) 01/13/2023    VLDL 23 07/02/2024    NHDL 80 07/02/2024       RENAL FUNCTION PANEL -   Lab Results   Component Value Date    GLUCOSE 90 07/02/2024     07/02/2024    K 4.0 07/02/2024     07/02/2024    CO2 28 07/02/2024    ANIONGAP 10 07/02/2024    BUN 18 07/02/2024    CREATININE 0.84 07/02/2024    CALCIUM 9.2 07/02/2024    ALBUMIN 4.0 07/02/2024        Lab Results   Component Value Date    BNP 66 05/25/2022       Last  Cardiology Tests:  ECG:  ECG 12 Lead 11/09/2023    Echo:  Transthoracic Echo Complete 08/22/2024    Stress Test:  Nuclear Stress Test 03/21/2022      Assessment/Plan   Atypical chest pain:  pinpoint pain in the left breast, random, lasting a few seconds   -to call us if this pain becomes more severe/frequent or occurs with exertion, at which point we would check a stress test     Hypertension  Today BP in office 102/66  -continue metoprolol - given Rx  -advised to stay hydrated     Dyslipidemia  Tells me she had some side effects with rosuvastatin  -advised she could stay off of rosuvastatin  -to have rpt FLP checked by PCP     History of MV prolapse  TTE 5/12/22: Mitral Valve: The mitral valve is normal in structure. There is no evidence of mitral valve regurgitation.  Today, patient denies any shortness of breath.  TTE 72000:  no significant MVP  -monitor clinically and re-check TTE if develops HF sx     Keila Dennis MD

## 2024-11-19 NOTE — PATIENT INSTRUCTIONS
Thank you for following up with us in office today.    1) Please continue the remainder of your medications as prescribed    If you have any questions, please contact the office and leave a message for Dr. Dennis's nurse, Ciera Baez.  [phone]  Please follow up with  [facility] in 6 months  Please follow up with Dr. Dennis in one year

## 2024-12-19 ENCOUNTER — OFFICE VISIT (OUTPATIENT)
Facility: HOSPITAL | Age: 89
End: 2024-12-19
Payer: MEDICARE

## 2024-12-19 VITALS — HEART RATE: 82 BPM | SYSTOLIC BLOOD PRESSURE: 147 MMHG | RESPIRATION RATE: 22 BRPM | DIASTOLIC BLOOD PRESSURE: 82 MMHG

## 2024-12-19 DIAGNOSIS — M79.605 PAIN IN BOTH LOWER EXTREMITIES: ICD-10-CM

## 2024-12-19 DIAGNOSIS — M79.604 PAIN IN BOTH LOWER EXTREMITIES: ICD-10-CM

## 2024-12-19 DIAGNOSIS — M79.604 PAIN OF RIGHT LOWER EXTREMITY: Primary | ICD-10-CM

## 2024-12-19 PROCEDURE — 1126F AMNT PAIN NOTED NONE PRSNT: CPT | Performed by: PHYSICAL MEDICINE & REHABILITATION

## 2024-12-19 PROCEDURE — 3077F SYST BP >= 140 MM HG: CPT | Performed by: PHYSICAL MEDICINE & REHABILITATION

## 2024-12-19 PROCEDURE — 1159F MED LIST DOCD IN RCRD: CPT | Performed by: PHYSICAL MEDICINE & REHABILITATION

## 2024-12-19 PROCEDURE — 99213 OFFICE O/P EST LOW 20 MIN: CPT | Performed by: PHYSICAL MEDICINE & REHABILITATION

## 2024-12-19 PROCEDURE — 1036F TOBACCO NON-USER: CPT | Performed by: PHYSICAL MEDICINE & REHABILITATION

## 2024-12-19 PROCEDURE — G2211 COMPLEX E/M VISIT ADD ON: HCPCS | Performed by: PHYSICAL MEDICINE & REHABILITATION

## 2024-12-19 PROCEDURE — 3079F DIAST BP 80-89 MM HG: CPT | Performed by: PHYSICAL MEDICINE & REHABILITATION

## 2024-12-19 PROCEDURE — 1160F RVW MEDS BY RX/DR IN RCRD: CPT | Performed by: PHYSICAL MEDICINE & REHABILITATION

## 2024-12-19 ASSESSMENT — PAIN SCALES - GENERAL: PAINLEVEL_OUTOF10: 0-NO PAIN

## 2024-12-19 NOTE — PROGRESS NOTES
[facility]   12/19/24       Chief Complaint:  Low back pain     HPI:  Consuelo Andres is a  89 y.o. F who presents to the clinic today  for evaluation of leg  Onset: R lateral side and bottom of foot and left medial lower leg pain. Started few months ago.  Location: mostly R lateral lower leg  Radiation: as above  No pain in thighs, intermittent low back pain   Quality: dull pain and sharp and comes and goes  Duration: intermittent  Aggravating factors:  sewing and pushing down on pedal and being busy  Walking, activities, bending, working in yard    Alleviating factors: not sure  Severity: 0 comes and goes  Numbness/Tingling: Yes - R arm  Bowel or bladder incontinence: Yes - chronic bladder  Pt's current medication regimen includes: gabapentin 100 mg for few weeks wo relief, not sure if had side effects     Treatment to date:  Physical therapy: No  Medications taken to date for this complaint include the following:   none  Prior injections:Hilda spine, But had left cmc injection        Does have numbness in the hand and arm on the R,.   Not sure it helps. Has a splint, two months,. No pain neck or upper arm. Follows w [facility].     Drives      She was last seen in October 2024. At that time we discussed:   1. Xr l spine  2. PT for core rom hep  3. Lidocaine cream prn  4. Voltaren gel prn  5. Gabapentin made her too drowsy  6. Renal insufficiency would try and avoid oral nsaids    Xr l spine reviewed,. Significant spondylosis.  R leg pain is better.   She does have back pain w activities like vacuuming was ok w rest.  Pain w working in the yard.  R forearm numbness.  Had therapy eval but no follow ups not clear.       Imaging  Reviewed 12/19/24 w patient and personally  Xr l spin3 10/2024  FINDINGS:  Lumbar spine, five views      There is moderate multilevel disc space narrowing osteophytosis  throughout the lumbar spine. There is moderate facet disease as well.  There is mild  scoliosis of the lumbar spine. No fracture seen. There  is minimal retrolisthesis of L1 on L2 and L2 on L3      IMPRESSION:      The multilevel spondylosis and moderate facet disease worse in the  lower lumbar spine. Minimal retrolisthesis L1 on L2 and L2 on L3   Xr tibia 8/2024  FINDINGS:  No fracture dislocation or bone lesion. Ankle joint and knee joint  are normal the extent of visualization. Soft tissues are normal.      IMPRESSION:  No abnormality  Past Medical History:   Diagnosis Date    Acute follicular conjunctivitis, bilateral 07/05/2024    Anesthesia of skin     Numbness and tingling in right hand    Diverticulitis 01/13/2024    Personal history of diseases of the blood and blood-forming organs and certain disorders involving the immune mechanism     History of anemia    Personal history of other medical treatment 05/06/2021    History of screening mammography    Unspecified cataract     Cataracts, bilateral        Past Surgical History:   Procedure Laterality Date    BLADDER SURGERY  09/07/2017    Bladder Surgery    BREAST BIOPSY  09/07/2017    Biopsy Breast Open    CARDIAC SURGERY  09/07/2017    Heart Surgery    FOOT SURGERY  09/07/2017    Foot Surgery    HYSTERECTOMY  09/07/2017    Hysterectomy    OTHER SURGICAL HISTORY  09/05/2022    Uvulopalatopharyngoplasty    OTHER SURGICAL HISTORY  03/06/2020    Surgery    OTHER SURGICAL HISTORY  11/09/2019    Cardiac catheterization    OTHER SURGICAL HISTORY  11/09/2019    Tonsillectomy        Patient Active Problem List    Diagnosis Date Noted    Chronic bilateral low back pain without sciatica 11/08/2024    Impaired gait and mobility 11/08/2024    Other chronic pancreatitis 08/14/2024    Exertional dyspnea 08/14/2024    Pelvic floor dysfunction 08/02/2024    Muscle weakness 08/02/2024    Urge incontinence of urine 05/07/2024    Mild protein-calorie malnutrition (Multi) 02/13/2024    History of anemia 12/18/2023    Injury of free lower extremity 12/18/2023     Routine general medical examination at health care facility 12/18/2023    Stage 3a chronic kidney disease (Multi) 12/18/2023    Breast tenderness 11/04/2023    Dry eye syndrome, bilateral 11/04/2023    Dermatitis 06/12/2023    Unknown cause of injury 06/12/2023    Colloid thyroid nodule 06/12/2023    Acute sinusitis 06/12/2023    Acute viral syndrome 06/12/2023    Contact dermatitis, allergic 06/12/2023    Rheumatoid nodule, left ankle and foot (Multi) 06/12/2023    Facial rash 06/12/2023    Skin lesions 06/12/2023    Cystitis 06/12/2023    Nipple pain 06/12/2023    Other chest pain 06/12/2023    Impacted cerumen of right ear 06/12/2023    Cortical cataract of right eye 06/12/2023    Diverticulosis 06/12/2023    Gastroesophageal reflux disease 06/12/2023    Muscle cramps 06/12/2023    Muscle weakness of lower extremity 06/12/2023    Nocturnal leg cramps 06/12/2023    Mitral valve prolapse 06/12/2023    Abdominal pain, LLQ (left lower quadrant) 04/28/2023    Abnormal CT scan, colon 04/28/2023    Abrasion 04/28/2023    Accidental fall 04/28/2023    Acute diverticulitis 04/28/2023    Acute generalized abdominal pain 04/28/2023    Acute maxillary sinusitis 04/28/2023    Acute nasopharyngitis 04/28/2023    Acute pancreatitis 04/28/2023    Acute UTI 04/28/2023    Adhesive capsulitis of shoulder 04/28/2023    Allergic contact dermatitis due to adhesives 04/28/2023    Anemia 04/28/2023    Anxiety 04/28/2023    Arthritis 04/28/2023    Bilateral impacted cerumen 04/28/2023    Carpal tunnel syndrome of right wrist 04/28/2023    Cataract, left 04/28/2023    Clindamycin adverse reaction 04/28/2023    Contact dermatitis due to plant 04/28/2023    Diaphragmatic hernia 04/28/2023    Diarrhea, unspecified 04/28/2023    Disorder of bone and cartilage 04/28/2023    Disorder of skin or subcutaneous tissue 04/28/2023    Diverticulosis of large intestine 04/28/2023    Dysuria 04/28/2023    Ecchymosis 04/28/2023    Fecal incontinence  04/28/2023    Flatulence, eructation, and gas pain 04/28/2023    GERD without esophagitis 04/28/2023    Hair loss 04/28/2023    Hematuria 04/28/2023    Hyperlipidemia 04/28/2023    Hypertension 04/28/2023    Hypothyroidism 04/28/2023    Incomplete bladder emptying 04/28/2023    Increased frequency of urination 04/28/2023    Laceration of right hand without foreign body 04/28/2023    Left knee pain 04/28/2023    Lesion of spleen 04/28/2023    Memory loss 04/28/2023    Midline cystocele 04/28/2023    Mitral valve disease 04/28/2023    Limb cramp 04/28/2023    Myalgia 04/28/2023    Neck pain 04/28/2023    Atypical chest pain 04/28/2023    Nocturia 04/28/2023    Nontoxic single thyroid nodule 04/28/2023    Obstructive sleep apnea 04/28/2023    Osteoarthritis of knee 04/28/2023    Pain in both feet 04/28/2023    Pain in joint involving pelvic region and thigh 04/28/2023    Pain of right lower extremity 04/28/2023    Poison ivy 04/28/2023    Postmenopausal bone loss 04/28/2023    Primary dysthymia 04/28/2023    Rectocele, female 04/28/2023    Restless legs syndrome 04/28/2023    Right hand paresthesia 04/28/2023    Antibiotic-induced allergic rash 04/28/2023    Skin sensation disturbance 04/28/2023    Swallowing disorder 04/28/2023    Swelling of left lower extremity 04/28/2023    Unsteady gait 04/28/2023    URI (upper respiratory infection) 04/28/2023    Vision changes 04/28/2023    Vitamin D deficiency 04/28/2023    Watery stools 04/28/2023    Dehydration 05/12/2022    Monocular diplopia of left eye 07/21/2021    Neurogenic bladder 09/21/2015    Urethral stricture 09/21/2015    Functional disorder of bladder 09/02/2015    Carcinoma in situ of skin 01/10/2012    Inflamed seborrheic keratosis 01/10/2012    Actinic keratosis 11/03/2011    Photoaged skin 10/04/2011        Family History   Problem Relation Name Age of Onset    Other (cva) Mother      Coronary artery disease Father      Coronary artery disease Brother       "Other (malignant neoplasm) Brother      Stomach cancer Brother          Current Outpatient Medications   Medication Sig Dispense Refill    cholecalciferol (Vitamin D-3) 25 MCG (1000 UT) tablet Take 1 tablet (1,000 Units) by mouth once daily. 90 tablet 1    diclofenac sodium (Voltaren) 1 % gel Apply 4.5 inches (4 g) topically 4 times a day as needed (pain). 100 g 2    levothyroxine (Synthroid, Levoxyl) 75 mcg tablet Take 1 tablet (75 mcg) by mouth once daily. 90 tablet 1    metoprolol succinate XL (Toprol-XL) 25 mg 24 hr tablet Take 1 tablet (25 mg) by mouth once daily. 90 tablet 3    multivitamin (Daily Multi-Vitamin) tablet Take 1 tablet by mouth once daily.      doxazosin (Cardura) 4 mg tablet Take 1 tablet (4 mg) by mouth once daily at bedtime. (Patient not taking: Reported on 12/19/2024)      lidocaine (LMX) 4 % cream Apply topically 4 times a day as needed for mild pain (1 - 3). (Patient not taking: Reported on 12/19/2024) 113 g 3    triamcinolone (Kenalog) 0.1 % cream Apply to affected area 1-2 times daily as needed. Avoid face and groin. (Patient not taking: Reported on 12/19/2024) 30 g 0     No current facility-administered medications for this visit.        Allergies   Allergen Reactions    Penicillins Anaphylaxis and Hives    Caffeine Other and Unknown    Ciprofloxacin Diarrhea    Donepezil Unknown     \"Aricept - Alzheimer's\"    Fexofenadine Unknown     \"Antihistamine\"    Lanolin Unknown     \"Sheep's wool\"    burning    Lipase-Protease-Amylase Unknown     \"Digestive enzymes\"    Pt states she can't remember, but that she knows she had a reaction.    Methen-M.Blue-S.Phos-Phsal-Hyo Unknown     \"Relieves discomfort, swelling, pain, frequent urge to urinate\"    Other reaction(s): Other (See Comments)   Pain in stomach,difficulty urinating,headache    Methylprednisolone Unknown    Mirabegron Unknown     \"Myrbetriq - Overactive bladder\"    Naproxen Unknown    Norfloxacin Other     \"Antibiotic\"    Rivastigmine " "Unknown     \"Dementia medication\"    Clindamycin Itching and Rash    Nitrofurantoin Rash    Ofloxacin Rash    Quinolones Hives, Diarrhea and Rash     \"Antibiotic\"    Sulfamethoxazole Rash    Trimethoprim Rash        Social History     Socioeconomic History    Marital status:    Tobacco Use    Smoking status: Never    Smokeless tobacco: Never   Vaping Use    Vaping status: Never Used   Substance and Sexual Activity    Alcohol use: Not Currently     Comment: back in her 20s    Drug use: Never     Social Drivers of Health     Food Insecurity: No Food Insecurity (8/14/2024)    Hunger Vital Sign     Worried About Running Out of Food in the Last Year: Never true     Ran Out of Food in the Last Year: Never true   Lives alone     Review of Systems:  Constitutional:  Denies fever or chills, malaise, weight changes.   Eyes:  Denies change in visual acuity   HENT:  Denies nasal congestion or sore throat   Respiratory:  Denies cough or shortness of breath   Cardiovascular:  Denies chest pain or edema   GI:  Denies abdominal pain, nausea, vomiting, bloody stools or diarrhea   :  Denies dysuria   Integument:  Denies rash   Neurologic:  As per HPI  MSK: Per above HPI  Endocrine:  Denies polyuria or polydipsia   Lymphatic:  Denies swollen glands   Psychiatric:  Denies depression or anxiety            PHYSICAL EXAM:  /82 (BP Location: Left arm, Patient Position: Sitting)   Pulse 82   Resp 22   LMP  (LMP Unknown)     General:  NAD, well developed, F      Psychiatric: appropriate mood & affect.   Cardiovascular:  Normal pedal pulses; no LE edema.  Respiratory:  Normal rate; unlabored breathing.  Skin:  Intact; no erythema; no ecchymosis or rash.  Lymphatic:  No lymphadenopathy or lymphedema.  NEURO:  Alert and appropriate. Speech fluent, conversing appropriately.   Motor:    Rt: HF 5/5, KE 5/5, KF 5/5, DF 5/5, EHL 5/5, PF 5/5.    Lt: HF 5/5, KE 5/5, KF 5/5, DF 5/5, EHL 5/5, PF 5/5.  Sensation:     Light touch: intact " in the b/l L3-S1 dermatomes.    PP: intact in the b/l L3-S1 dermatomes  Reflexes:     Right:  patellar 2+, achilles 2+,     Left: patellar 2+, achilles 2+,     Babinski's downgoing b/l; no clonus  Gait: antalgic    MSK:  Inspection reveals no evidence of a pelvic obliqity   Flexed forward  Spinal range of motion: Flexion to 30° degrees, Extension of 10 degrees.  There is mild tenderness over the paraspinals bl.   Special tests:    Straight leg raise: -bl    Slump test: -bl    Facet loading: -bl          Impression: Consuelo Andres is a 89 y.o. F w pmh of hlp, gerd presenting with distal LE pain that could be neurogenic in nature thought difficult to replicate on exam.  Plan:     1. Xr l spine reviewed w patient and personally, on personal review spondylosis and facet arthropathy  2. PT for core rom hep, had one eval and no follow up, contacted therapist to help set up more sessions and also work on proper lifting techniques  3. Lidocaine cream or patch prn  4. Voltaren gel prn  5. Gabapentin made her too drowsy  6. Renal insufficiency would try and avoid oral nsaids    Fu 8 weeks      Anne Streeter MD  Physical Medicine and Rehabilitation

## 2024-12-30 ENCOUNTER — APPOINTMENT (OUTPATIENT)
Dept: RADIOLOGY | Facility: HOSPITAL | Age: 89
End: 2024-12-30
Payer: MEDICARE

## 2024-12-30 ENCOUNTER — HOSPITAL ENCOUNTER (EMERGENCY)
Facility: HOSPITAL | Age: 89
Discharge: HOME | End: 2024-12-30
Payer: MEDICARE

## 2024-12-30 VITALS
RESPIRATION RATE: 16 BRPM | BODY MASS INDEX: 28.35 KG/M2 | OXYGEN SATURATION: 95 % | WEIGHT: 160 LBS | HEIGHT: 63 IN | HEART RATE: 92 BPM | TEMPERATURE: 98.8 F

## 2024-12-30 DIAGNOSIS — J11.1 FLU: Primary | ICD-10-CM

## 2024-12-30 LAB
FLUAV RNA RESP QL NAA+PROBE: DETECTED
FLUBV RNA RESP QL NAA+PROBE: NOT DETECTED
RSV RNA RESP QL NAA+PROBE: NOT DETECTED
S PYO DNA THROAT QL NAA+PROBE: NOT DETECTED
SARS-COV-2 RNA RESP QL NAA+PROBE: NOT DETECTED

## 2024-12-30 PROCEDURE — 71046 X-RAY EXAM CHEST 2 VIEWS: CPT

## 2024-12-30 PROCEDURE — 87637 SARSCOV2&INF A&B&RSV AMP PRB: CPT | Performed by: NURSE PRACTITIONER

## 2024-12-30 PROCEDURE — 87651 STREP A DNA AMP PROBE: CPT | Performed by: NURSE PRACTITIONER

## 2024-12-30 PROCEDURE — 2500000004 HC RX 250 GENERAL PHARMACY W/ HCPCS (ALT 636 FOR OP/ED): Performed by: NURSE PRACTITIONER

## 2024-12-30 PROCEDURE — 99284 EMERGENCY DEPT VISIT MOD MDM: CPT | Mod: 25

## 2024-12-30 PROCEDURE — 71046 X-RAY EXAM CHEST 2 VIEWS: CPT | Performed by: RADIOLOGY

## 2024-12-30 RX ORDER — DEXAMETHASONE 4 MG/1
10 TABLET ORAL ONCE
Status: COMPLETED | OUTPATIENT
Start: 2024-12-30 | End: 2024-12-30

## 2024-12-30 RX ADMIN — DEXAMETHASONE 10 MG: 4 TABLET ORAL at 17:04

## 2024-12-30 ASSESSMENT — PAIN DESCRIPTION - LOCATION: LOCATION: HEAD

## 2024-12-30 ASSESSMENT — PAIN - FUNCTIONAL ASSESSMENT: PAIN_FUNCTIONAL_ASSESSMENT: 0-10

## 2024-12-30 ASSESSMENT — COLUMBIA-SUICIDE SEVERITY RATING SCALE - C-SSRS
1. IN THE PAST MONTH, HAVE YOU WISHED YOU WERE DEAD OR WISHED YOU COULD GO TO SLEEP AND NOT WAKE UP?: NO
6. HAVE YOU EVER DONE ANYTHING, STARTED TO DO ANYTHING, OR PREPARED TO DO ANYTHING TO END YOUR LIFE?: NO
2. HAVE YOU ACTUALLY HAD ANY THOUGHTS OF KILLING YOURSELF?: NO

## 2024-12-30 ASSESSMENT — PAIN DESCRIPTION - PAIN TYPE: TYPE: ACUTE PAIN

## 2024-12-30 ASSESSMENT — LIFESTYLE VARIABLES
HAVE PEOPLE ANNOYED YOU BY CRITICIZING YOUR DRINKING: NO
HAVE YOU EVER FELT YOU SHOULD CUT DOWN ON YOUR DRINKING: NO
EVER HAD A DRINK FIRST THING IN THE MORNING TO STEADY YOUR NERVES TO GET RID OF A HANGOVER: NO
EVER FELT BAD OR GUILTY ABOUT YOUR DRINKING: NO
TOTAL SCORE: 0

## 2024-12-30 ASSESSMENT — PAIN SCALES - GENERAL: PAINLEVEL_OUTOF10: 10 - WORST POSSIBLE PAIN

## 2024-12-30 NOTE — ED TRIAGE NOTES
Pt presents for  sore throat, fever, chills, sore throat HA and body aches that started on Saturday. States that she also has acid reflux that she has had since her colonoscopy that is not new.

## 2024-12-31 ENCOUNTER — APPOINTMENT (OUTPATIENT)
Dept: CARDIOLOGY | Facility: HOSPITAL | Age: 89
End: 2024-12-31
Payer: MEDICARE

## 2024-12-31 ENCOUNTER — HOSPITAL ENCOUNTER (EMERGENCY)
Facility: HOSPITAL | Age: 89
Discharge: HOME | End: 2024-12-31
Attending: EMERGENCY MEDICINE
Payer: MEDICARE

## 2024-12-31 VITALS
OXYGEN SATURATION: 98 % | RESPIRATION RATE: 18 BRPM | WEIGHT: 158 LBS | HEART RATE: 65 BPM | SYSTOLIC BLOOD PRESSURE: 150 MMHG | DIASTOLIC BLOOD PRESSURE: 78 MMHG | BODY MASS INDEX: 28 KG/M2 | HEIGHT: 63 IN | TEMPERATURE: 97.2 F

## 2024-12-31 DIAGNOSIS — R53.1 GENERALIZED WEAKNESS: Primary | ICD-10-CM

## 2024-12-31 LAB
ALBUMIN SERPL BCP-MCNC: 4.3 G/DL (ref 3.4–5)
ALP SERPL-CCNC: 60 U/L (ref 33–136)
ALT SERPL W P-5'-P-CCNC: 16 U/L (ref 7–45)
ANION GAP SERPL CALC-SCNC: 13 MMOL/L (ref 10–20)
AST SERPL W P-5'-P-CCNC: 28 U/L (ref 9–39)
BASOPHILS # BLD AUTO: 0.01 X10*3/UL (ref 0–0.1)
BASOPHILS NFR BLD AUTO: 0.1 %
BILIRUB SERPL-MCNC: 0.6 MG/DL (ref 0–1.2)
BUN SERPL-MCNC: 30 MG/DL (ref 6–23)
CALCIUM SERPL-MCNC: 9.7 MG/DL (ref 8.6–10.3)
CARDIAC TROPONIN I PNL SERPL HS: 7 NG/L (ref 0–13)
CHLORIDE SERPL-SCNC: 101 MMOL/L (ref 98–107)
CO2 SERPL-SCNC: 27 MMOL/L (ref 21–32)
CREAT SERPL-MCNC: 0.87 MG/DL (ref 0.5–1.05)
EGFRCR SERPLBLD CKD-EPI 2021: 63 ML/MIN/1.73M*2
EOSINOPHIL # BLD AUTO: 0.01 X10*3/UL (ref 0–0.4)
EOSINOPHIL NFR BLD AUTO: 0.1 %
ERYTHROCYTE [DISTWIDTH] IN BLOOD BY AUTOMATED COUNT: 13.3 % (ref 11.5–14.5)
GLUCOSE SERPL-MCNC: 110 MG/DL (ref 74–99)
HCT VFR BLD AUTO: 46.1 % (ref 36–46)
HGB BLD-MCNC: 15.4 G/DL (ref 12–16)
IMM GRANULOCYTES # BLD AUTO: 0.03 X10*3/UL (ref 0–0.5)
IMM GRANULOCYTES NFR BLD AUTO: 0.3 % (ref 0–0.9)
LYMPHOCYTES # BLD AUTO: 1.31 X10*3/UL (ref 0.8–3)
LYMPHOCYTES NFR BLD AUTO: 13.4 %
MCH RBC QN AUTO: 30.7 PG (ref 26–34)
MCHC RBC AUTO-ENTMCNC: 33.4 G/DL (ref 32–36)
MCV RBC AUTO: 92 FL (ref 80–100)
MONOCYTES # BLD AUTO: 1.19 X10*3/UL (ref 0.05–0.8)
MONOCYTES NFR BLD AUTO: 12.2 %
NEUTROPHILS # BLD AUTO: 7.19 X10*3/UL (ref 1.6–5.5)
NEUTROPHILS NFR BLD AUTO: 73.9 %
NRBC BLD-RTO: 0 /100 WBCS (ref 0–0)
PLATELET # BLD AUTO: 185 X10*3/UL (ref 150–450)
POTASSIUM SERPL-SCNC: 4.6 MMOL/L (ref 3.5–5.3)
PROT SERPL-MCNC: 7.2 G/DL (ref 6.4–8.2)
RBC # BLD AUTO: 5.01 X10*6/UL (ref 4–5.2)
SODIUM SERPL-SCNC: 136 MMOL/L (ref 136–145)
WBC # BLD AUTO: 9.7 X10*3/UL (ref 4.4–11.3)

## 2024-12-31 PROCEDURE — 99284 EMERGENCY DEPT VISIT MOD MDM: CPT | Performed by: EMERGENCY MEDICINE

## 2024-12-31 PROCEDURE — 85025 COMPLETE CBC W/AUTO DIFF WBC: CPT | Performed by: NURSE PRACTITIONER

## 2024-12-31 PROCEDURE — 93005 ELECTROCARDIOGRAM TRACING: CPT

## 2024-12-31 PROCEDURE — 84484 ASSAY OF TROPONIN QUANT: CPT | Performed by: NURSE PRACTITIONER

## 2024-12-31 PROCEDURE — 36415 COLL VENOUS BLD VENIPUNCTURE: CPT | Performed by: NURSE PRACTITIONER

## 2024-12-31 PROCEDURE — 80053 COMPREHEN METABOLIC PANEL: CPT | Performed by: NURSE PRACTITIONER

## 2024-12-31 ASSESSMENT — PAIN SCALES - GENERAL: PAINLEVEL_OUTOF10: 0 - NO PAIN

## 2024-12-31 ASSESSMENT — COLUMBIA-SUICIDE SEVERITY RATING SCALE - C-SSRS
2. HAVE YOU ACTUALLY HAD ANY THOUGHTS OF KILLING YOURSELF?: NO
1. IN THE PAST MONTH, HAVE YOU WISHED YOU WERE DEAD OR WISHED YOU COULD GO TO SLEEP AND NOT WAKE UP?: NO
6. HAVE YOU EVER DONE ANYTHING, STARTED TO DO ANYTHING, OR PREPARED TO DO ANYTHING TO END YOUR LIFE?: NO

## 2024-12-31 ASSESSMENT — PAIN - FUNCTIONAL ASSESSMENT: PAIN_FUNCTIONAL_ASSESSMENT: 0-10

## 2024-12-31 NOTE — ED PROVIDER NOTES
"Chief Complaint   Patient presents with   • Weakness, Gen     Weakness Tested positive for the flu yesterday        HPI       89 year old female presents to the Emergency Department today complaining of feeling weak and shaky today that is worsened with ambulation. States that she felt as though nichole legs were \"numb\" at times. Has had URI symptoms over the past several days and was evaluated in the ED yesterday and diagnosed with Influenza A. Denies any associated fever, chills, headache, neck pain, chest pain, shortness of breath, abdominal pain, nausea, vomiting, diarrhea, constipation, or urinary symptoms. Notes that her appetite has been decreased.       History provided by:  Patient             Patient History   Past Medical History:   Diagnosis Date   • Acute follicular conjunctivitis, bilateral 07/05/2024   • Anesthesia of skin     Numbness and tingling in right hand   • Diverticulitis 01/13/2024   • Personal history of diseases of the blood and blood-forming organs and certain disorders involving the immune mechanism     History of anemia   • Personal history of other medical treatment 05/06/2021    History of screening mammography   • Unspecified cataract     Cataracts, bilateral     Past Surgical History:   Procedure Laterality Date   • BLADDER SURGERY  09/07/2017    Bladder Surgery   • BREAST BIOPSY  09/07/2017    Biopsy Breast Open   • CARDIAC SURGERY  09/07/2017    Heart Surgery   • FOOT SURGERY  09/07/2017    Foot Surgery   • HYSTERECTOMY  09/07/2017    Hysterectomy   • OTHER SURGICAL HISTORY  09/05/2022    Uvulopalatopharyngoplasty   • OTHER SURGICAL HISTORY  03/06/2020    Surgery   • OTHER SURGICAL HISTORY  11/09/2019    Cardiac catheterization   • OTHER SURGICAL HISTORY  11/09/2019    Tonsillectomy     Family History   Problem Relation Name Age of Onset   • Other (cva) Mother     • Coronary artery disease Father     • Coronary artery disease Brother     • Other (malignant neoplasm) Brother     • " Stomach cancer Brother       Social History     Tobacco Use   • Smoking status: Never   • Smokeless tobacco: Never   Vaping Use   • Vaping status: Never Used   Substance Use Topics   • Alcohol use: Not Currently     Comment: back in her 20s   • Drug use: Never           Physical Exam  Constitutional:       Appearance: Normal appearance.   HENT:      Head: Normocephalic.      Right Ear: External ear normal.      Left Ear: External ear normal.      Nose: Nose normal.      Mouth/Throat:      Mouth: Mucous membranes are moist.      Pharynx: Oropharynx is clear. No oropharyngeal exudate or posterior oropharyngeal erythema.   Eyes:      Conjunctiva/sclera: Conjunctivae normal.      Pupils: Pupils are equal, round, and reactive to light.   Cardiovascular:      Rate and Rhythm: Normal rate and regular rhythm.      Pulses:           Radial pulses are 3+ on the right side and 3+ on the left side.        Dorsalis pedis pulses are 3+ on the right side and 3+ on the left side.      Heart sounds: Normal heart sounds. No murmur heard.     No friction rub. No gallop.   Pulmonary:      Effort: Pulmonary effort is normal. No respiratory distress.      Breath sounds: Normal breath sounds. No wheezing, rhonchi or rales.   Abdominal:      General: Abdomen is flat. Bowel sounds are normal.      Palpations: Abdomen is soft.      Tenderness: There is no abdominal tenderness. There is no right CVA tenderness, left CVA tenderness, guarding or rebound. Negative signs include Nguyen's sign and McBurney's sign.   Musculoskeletal:         General: No swelling or deformity.      Cervical back: Full passive range of motion without pain.      Right lower leg: No edema.      Left lower leg: No edema.   Lymphadenopathy:      Cervical: No cervical adenopathy.   Skin:     Capillary Refill: Capillary refill takes less than 2 seconds.      Coloration: Skin is not jaundiced.      Findings: No rash.   Neurological:      General: No focal deficit present.       Mental Status: She is alert and oriented to person, place, and time. Mental status is at baseline.      Gait: Gait is intact.   Psychiatric:         Mood and Affect: Mood normal.         Behavior: Behavior is cooperative.         Labs Reviewed   CBC WITH AUTO DIFFERENTIAL - Abnormal       Result Value    WBC 9.7      nRBC 0.0      RBC 5.01      Hemoglobin 15.4      Hematocrit 46.1 (*)     MCV 92      MCH 30.7      MCHC 33.4      RDW 13.3      Platelets 185      Neutrophils % 73.9      Immature Granulocytes %, Automated 0.3      Lymphocytes % 13.4      Monocytes % 12.2      Eosinophils % 0.1      Basophils % 0.1      Neutrophils Absolute 7.19 (*)     Immature Granulocytes Absolute, Automated 0.03      Lymphocytes Absolute 1.31      Monocytes Absolute 1.19 (*)     Eosinophils Absolute 0.01      Basophils Absolute 0.01     COMPREHENSIVE METABOLIC PANEL - Abnormal    Glucose 110 (*)     Sodium 136      Potassium 4.6      Chloride 101      Bicarbonate 27      Anion Gap 13      Urea Nitrogen 30 (*)     Creatinine 0.87      eGFR 63      Calcium 9.7      Albumin 4.3      Alkaline Phosphatase 60      Total Protein 7.2      AST 28      Bilirubin, Total 0.6      ALT 16     TROPONIN I, HIGH SENSITIVITY - Normal    Troponin I, High Sensitivity 7      Narrative:     Less than 99th percentile of normal range cutoff-  Female and children under 18 years old <14 ng/L; Male <21 ng/L: Negative  Repeat testing should be performed if clinically indicated.     Female and children under 18 years old 14-50 ng/L; Male 21-50 ng/L:  Consistent with possible cardiac damage and possible increased clinical   risk. Serial measurements may help to assess extent of myocardial damage.     >50 ng/L: Consistent with cardiac damage, increased clinical risk and  myocardial infarction. Serial measurements may help assess extent of   myocardial damage.      NOTE: Children less than 1 year old may have higher baseline troponin   levels and results  should be interpreted in conjunction with the overall   clinical context.     NOTE: Troponin I testing is performed using a different   testing methodology at [facility] than at other   [facility] hospitals. Direct result comparisons should only   be made within the same method.       No orders to display            ED Course & MDM   Diagnoses as of 12/31/24 1852   Generalized weakness           Medical Decision Making  EKG interpreted by Dr. Jack. Indication: weakness. Findings: NSR with a ventricular rate of 74, normal axis, normal intervals, and no acute ischemic or injury pattern. Impression: No acute pathology.       Patient was seen and evaluated by Dr. Jack. Orthostatic VS were obtained and within normal limits and patient was asymptomatic. Saline lock was established with labs drawn and results as above. Blood counts, electrolytes, kidney function, and liver function were unremarkable. Her troponin was normal with a normal EKG. Therefore, we do not feel that her generalized weakness is cardiac in nature. Her symptoms are likely secondary to the Influenza A. Follow up with their doctor in 3 days. Return if worse in any way. Discharged in stable condition with computer instructions.    Diagnostic Impression:     1. Acute generalized weakness           Your medication list        ASK your doctor about these medications        Instructions Last Dose Given Next Dose Due   cholecalciferol 25 MCG (1000 UT) tablet  Commonly known as: Vitamin D-3      Take 1 tablet (1,000 Units) by mouth once daily.       Daily Multi-Vitamin tablet  Generic drug: multivitamin           diclofenac sodium 1 % gel  Commonly known as: Voltaren      Apply 4.5 inches (4 g) topically 4 times a day as needed (pain).       levothyroxine 75 mcg tablet  Commonly known as: Synthroid, Levoxyl      Take 1 tablet (75 mcg) by mouth once daily.       metoprolol succinate XL 25 mg 24 hr tablet  Commonly known as: Toprol-XL      Take  1 tablet (25 mg) by mouth once daily.                  Procedure  Procedures     Randy Morelos, APRN-CNP  12/31/24 [id number]

## 2024-12-31 NOTE — PROGRESS NOTES
Patient was seen in ED On 12/30 for flu like symptoms, tested positive for influenza A. Called office today reporting feeling very weak, tired, numb in the legs and hands for the past 4-5 days and hypertensive at 162/62    Reports feeling worse today. Directed clerical staff to advise patient return to ED for further evaluation and consideration for admission given her age, severity of symptoms.

## 2025-01-02 LAB
ATRIAL RATE: 74 BPM
P AXIS: 85 DEGREES
PR INTERVAL: 172 MS
Q ONSET: 252 MS
QRS COUNT: 12 BEATS
QRS DURATION: 127 MS
QT INTERVAL: 391 MS
QTC CALCULATION(BAZETT): 434 MS
QTC FREDERICIA: 419 MS
R AXIS: 14 DEGREES
T AXIS: 27 DEGREES
T OFFSET: 448 MS
VENTRICULAR RATE: 74 BPM

## 2025-01-07 ENCOUNTER — APPOINTMENT (OUTPATIENT)
Dept: PRIMARY CARE | Facility: CLINIC | Age: OVER 89
End: 2025-01-07
Payer: MEDICARE

## 2025-01-07 VITALS
WEIGHT: 156 LBS | SYSTOLIC BLOOD PRESSURE: 126 MMHG | RESPIRATION RATE: 18 BRPM | BODY MASS INDEX: 27.64 KG/M2 | DIASTOLIC BLOOD PRESSURE: 80 MMHG | HEIGHT: 63 IN

## 2025-01-07 DIAGNOSIS — E78.49 OTHER HYPERLIPIDEMIA: ICD-10-CM

## 2025-01-07 DIAGNOSIS — E03.9 HYPOTHYROIDISM, UNSPECIFIED TYPE: ICD-10-CM

## 2025-01-07 DIAGNOSIS — I10 HYPERTENSION, UNSPECIFIED TYPE: ICD-10-CM

## 2025-01-07 DIAGNOSIS — K21.9 GERD WITHOUT ESOPHAGITIS: ICD-10-CM

## 2025-01-07 DIAGNOSIS — Z00.00 ROUTINE GENERAL MEDICAL EXAMINATION AT HEALTH CARE FACILITY: Primary | ICD-10-CM

## 2025-01-07 DIAGNOSIS — E55.9 VITAMIN D DEFICIENCY: ICD-10-CM

## 2025-01-07 DIAGNOSIS — J10.1 INFLUENZA A: ICD-10-CM

## 2025-01-07 PROBLEM — K86.1 OTHER CHRONIC PANCREATITIS: Status: RESOLVED | Noted: 2024-08-14 | Resolved: 2025-01-07

## 2025-01-07 PROBLEM — N18.31 STAGE 3A CHRONIC KIDNEY DISEASE (MULTI): Status: RESOLVED | Noted: 2023-12-18 | Resolved: 2025-01-07

## 2025-01-07 PROBLEM — M06.372: Status: RESOLVED | Noted: 2023-06-12 | Resolved: 2025-01-07

## 2025-01-07 PROCEDURE — G0439 PPPS, SUBSEQ VISIT: HCPCS | Performed by: INTERNAL MEDICINE

## 2025-01-07 PROCEDURE — 1036F TOBACCO NON-USER: CPT | Performed by: INTERNAL MEDICINE

## 2025-01-07 PROCEDURE — 99215 OFFICE O/P EST HI 40 MIN: CPT | Performed by: INTERNAL MEDICINE

## 2025-01-07 PROCEDURE — 3079F DIAST BP 80-89 MM HG: CPT | Performed by: INTERNAL MEDICINE

## 2025-01-07 PROCEDURE — 1126F AMNT PAIN NOTED NONE PRSNT: CPT | Performed by: INTERNAL MEDICINE

## 2025-01-07 PROCEDURE — 1170F FXNL STATUS ASSESSED: CPT | Performed by: INTERNAL MEDICINE

## 2025-01-07 PROCEDURE — 3074F SYST BP LT 130 MM HG: CPT | Performed by: INTERNAL MEDICINE

## 2025-01-07 PROCEDURE — 1159F MED LIST DOCD IN RCRD: CPT | Performed by: INTERNAL MEDICINE

## 2025-01-07 PROCEDURE — 1160F RVW MEDS BY RX/DR IN RCRD: CPT | Performed by: INTERNAL MEDICINE

## 2025-01-07 RX ORDER — METOPROLOL SUCCINATE 25 MG/1
25 TABLET, EXTENDED RELEASE ORAL DAILY
Qty: 90 TABLET | Refills: 3 | Status: SHIPPED | OUTPATIENT
Start: 2025-01-07 | End: 2026-01-07

## 2025-01-07 RX ORDER — FAMOTIDINE 40 MG/1
40 TABLET, FILM COATED ORAL DAILY
Qty: 90 TABLET | Refills: 1 | Status: SHIPPED | OUTPATIENT
Start: 2025-01-07 | End: 2026-01-07

## 2025-01-07 RX ORDER — LEVOTHYROXINE SODIUM 75 UG/1
75 TABLET ORAL DAILY
Qty: 90 TABLET | Refills: 1 | Status: SHIPPED | OUTPATIENT
Start: 2025-01-07

## 2025-01-07 RX ORDER — BENZONATATE 200 MG/1
200 CAPSULE ORAL 3 TIMES DAILY PRN
Qty: 42 CAPSULE | Refills: 0 | Status: SHIPPED | OUTPATIENT
Start: 2025-01-07 | End: 2025-02-06

## 2025-01-07 SDOH — ECONOMIC STABILITY: FOOD INSECURITY: WITHIN THE PAST 12 MONTHS, YOU WORRIED THAT YOUR FOOD WOULD RUN OUT BEFORE YOU GOT MONEY TO BUY MORE.: NEVER TRUE

## 2025-01-07 SDOH — ECONOMIC STABILITY: FOOD INSECURITY: WITHIN THE PAST 12 MONTHS, THE FOOD YOU BOUGHT JUST DIDN'T LAST AND YOU DIDN'T HAVE MONEY TO GET MORE.: NEVER TRUE

## 2025-01-07 ASSESSMENT — ENCOUNTER SYMPTOMS
OCCASIONAL FEELINGS OF UNSTEADINESS: 1
DEPRESSION: 0
LOSS OF SENSATION IN FEET: 1

## 2025-01-07 ASSESSMENT — ACTIVITIES OF DAILY LIVING (ADL)
MANAGING_FINANCES: INDEPENDENT
TAKING_MEDICATION: INDEPENDENT
GROCERY_SHOPPING: NEEDS ASSISTANCE
BATHING: INDEPENDENT
DRESSING: INDEPENDENT
DOING_HOUSEWORK: INDEPENDENT

## 2025-01-07 ASSESSMENT — PAIN SCALES - GENERAL: PAINLEVEL_OUTOF10: 0-NO PAIN

## 2025-01-07 ASSESSMENT — LIFESTYLE VARIABLES
HOW MANY STANDARD DRINKS CONTAINING ALCOHOL DO YOU HAVE ON A TYPICAL DAY: PATIENT DOES NOT DRINK
AUDIT-C TOTAL SCORE: 0
HOW OFTEN DO YOU HAVE SIX OR MORE DRINKS ON ONE OCCASION: NEVER
HOW OFTEN DO YOU HAVE A DRINK CONTAINING ALCOHOL: NEVER
SKIP TO QUESTIONS 9-10: 1

## 2025-01-07 NOTE — ASSESSMENT & PLAN NOTE
Patient has GERD symptoms, trial of famotidine as ordered.     Orders:    famotidine (Pepcid) 40 mg tablet; Take 1 tablet (40 mg) by mouth once daily.

## 2025-01-07 NOTE — ASSESSMENT & PLAN NOTE
Patient recently was diagnosed, she has a residual cough. Patient still feels week, she has been sleeping, she still wakes up to urinate.  Will order Tessalon for treatment of cough symptoms.   Orders:    benzonatate (Tessalon) 200 mg capsule; Take 1 capsule (200 mg) by mouth 3 times a day as needed for cough. Do not crush or chew.

## 2025-01-07 NOTE — ASSESSMENT & PLAN NOTE
Continue current dose of levothyroxine, recheck labs as ordered  Orders:    levothyroxine (Synthroid, Levoxyl) 75 mcg tablet; Take 1 tablet (75 mcg) by mouth once daily.

## 2025-01-07 NOTE — ASSESSMENT & PLAN NOTE
BP is very stable today, no med changes are needed. Check labs as already ordered.   Orders:    metoprolol succinate XL (Toprol-XL) 25 mg 24 hr tablet; Take 1 tablet (25 mg) by mouth once daily.

## 2025-01-07 NOTE — PROGRESS NOTES
"Subjective   Reason for Visit: Consuelo Andres is an 89 y.o. female here for a Medicare Wellness visit.     Past Medical, Surgical, and Family History reviewed and updated in chart.    Reviewed all medications by prescribing practitioner or clinical pharmacist (such as prescriptions, OTCs, herbal therapies and supplements) and documented in the medical record.    HPI    Follow up and Medicare wellness exam:     Influenza A: recently diagnosed after ER evaluation. Patient visited [facility] on Christmas Eve. She developed flu like symptoms. Patient was seen at the ER, she was treated symptomatically also. Patient went to ER again on New Year's [place], she had tremor at that time.  She still feels weak, she does have a cough.     food does not taste good,  patient states that she eats due to necessity only, patient has \"acid taste \" in the throat all the time.     urinary frequency/incontinence: H/o uterine prolapse. The patient states that she can go from seated to standing and notices that she leaks some urine. Patient was advised to do Kegel exercises, she still forgets to do them. She has noticed decreased urination. She also does not empty her bowels completely. She does get some rectal leaking. She had colonoscopy completed at [facility].   Patient does see Dr Ray, she is supposed to follow up with Dr Ray for possible bladder stimulation device, she states. Patient states that she does not want to have \"needles\" in her back , patient has never discussed Botox option, (not certain if this is a reasonable option). Patient has been going to bathroom every hour, she has been drinking a lot of water since illness Patient had colonoscopy completed. She no longer is having abdominal pain that she had before. .       memory issues: memory issues have not changed now, she is stable. Patient has current memory issues. She denies any prior memory issues.     right hand numbness: patient only notes numbness in the hand when she " "wakes up in the morning. She also has thenar numbness as well. Dr Garcia ordered a cock up wrist splint. The splint is effective the patient states.      HTN/MV prolapse: Currently on metoprolol,  she does note some lightheadedness.  She did not stop the Cardura.  Patient still gets recurrent chest pain and shortness of breath with exertion. Patient does see Dr Dennis.  Patient is to go for PT also, she states     Patient feels dizzy, she has not fallen, she is careful at home , PT is ordered patient states     hyperlipidemia: patient no longer takes med therapy     hypothyroidism: takes levothyroxine 75 mcg  daily.    Recent thyroid ultrasound with multiple nodules appreciated. Right sided 25 mm TIRADS 3 nodule that appears to have increased in size since 2019 US. Patient had needle aspiration completed     Sleep issues:  patient states that she had sleep study completed at [facility]. She does not wear a CPAP. She did see sleep medicine, she apparently no longer needs to wear CPAP       Patient Care Team:  Gian Traylor MD as PCP - General (Internal Medicine)  Gian Traylor MD as PCP - [employer] PCP  Keila Dennis MD as Consulting Physician (Cardiology)     Review of Systems     otherwise negative aside from what was mentioned above in HPI.     Objective   Vitals:  /80 (BP Location: Left arm, Patient Position: Sitting, BP Cuff Size: Adult)   Resp 18   Ht 1.6 m (5' 3\")   Wt 70.8 kg (156 lb)   LMP  (LMP Unknown)   BMI 27.63 kg/m²       Physical Exam    CONSTITUTIONAL - well nourished, well developed, looks like stated age, in no acute distress, not ill-appearing, and not tired appearing , wearing a mask, occasional cough is noted.   SKIN - normal skin color and pigmentation, normal skin turgor without rash, lesions, or nodules visualized on exposed skin  HEAD - no trauma, normocephalic  EYES - extraocular muscles are intact, and normal external exam, wears glasses  ENT - " auricles are intact  NECK - supple without rigidity, no neck mass was noted, no obvious thyromegaly  CHEST - clear to auscultation, no wheezing, no crackles and no rales, good effort, diminished breath sounds in the bases only,  occasional cough noted with deep breathing  CARDIAC - regular rate and regular rhythm, no skipped beats, no murmur, no carotid bruits noted  EXTREMITIES - no edema, no deformities  ABDOMEN - no tenderness is noted, no palpable masses. Patient admits to burning in the chest associated with acid reflux symptoms   NEUROLOGICAL - normal gait, normal balance, normal motor, no ataxia, alert, oriented and no focal signs  PSYCHIATRIC - alert, pleasant and cordial, age-appropriate, she does c/o of some memory loss  IMMUNOLOGIC - no cervical lymphadenopathy   MSK: tender over the  anterior legs, no edema noted       Assessment & Plan  Hypertension, unspecified type  BP is very stable today, no med changes are needed. Check labs as already ordered.   Orders:    metoprolol succinate XL (Toprol-XL) 25 mg 24 hr tablet; Take 1 tablet (25 mg) by mouth once daily.    Hypothyroidism, unspecified type  Continue current dose of levothyroxine, recheck labs as ordered  Orders:    levothyroxine (Synthroid, Levoxyl) 75 mcg tablet; Take 1 tablet (75 mcg) by mouth once daily.    Routine general medical examination at [facility] care facility  Medicare wellness exam completed today,  get PT completed as ordered also  Orders:    1 Year Follow Up In Primary Care - Wellness Exam; Future    Other hyperlipidemia  Patient takes no meds now, will monitor       Vitamin D deficiency  Check labs as ordered       Influenza A  Patient recently was diagnosed, she has a residual cough. Patient still feels week, she has been sleeping, she still wakes up to urinate.  Will order Tessalon for treatment of cough symptoms.   Orders:    benzonatate (Tessalon) 200 mg capsule; Take 1 capsule (200 mg) by mouth 3 times a day as needed for cough.  Do not crush or chew.    GERD without esophagitis  Patient has GERD symptoms, trial of famotidine as ordered.     Orders:    famotidine (Pepcid) 40 mg tablet; Take 1 tablet (40 mg) by mouth once daily.     Get labs checked, see if the famotidine helps with GERD symptoms.  Follow up in about 3 months

## 2025-01-07 NOTE — ASSESSMENT & PLAN NOTE
Medicare wellness exam completed today,  get PT completed as ordered also  Orders:    1 Year Follow Up In Primary Care - Wellness Exam; Future

## 2025-01-08 ENCOUNTER — APPOINTMENT (OUTPATIENT)
Dept: PHYSICAL THERAPY | Facility: HOSPITAL | Age: OVER 89
End: 2025-01-08
Payer: MEDICARE

## 2025-01-08 ENCOUNTER — DOCUMENTATION (OUTPATIENT)
Dept: PHYSICAL THERAPY | Facility: HOSPITAL | Age: OVER 89
End: 2025-01-08
Payer: MEDICARE

## 2025-01-08 NOTE — PROGRESS NOTES
Physical Therapy                 Therapy Communication Note    Patient Name: Consuelo Andres  MRN: [id number]  Today's Date: 1/8/2025     Discipline: Physical Therapy    Missed Time: Cancel    Missed Visit Reason:  Treatment session canceled due to pt illness. Recheck rescheduled for 1/14/25.

## 2025-01-14 ENCOUNTER — TREATMENT (OUTPATIENT)
Dept: PHYSICAL THERAPY | Facility: HOSPITAL | Age: OVER 89
End: 2025-01-14
Payer: MEDICARE

## 2025-01-14 DIAGNOSIS — R26.89 IMPAIRED GAIT AND MOBILITY: ICD-10-CM

## 2025-01-14 DIAGNOSIS — G89.29 CHRONIC BILATERAL LOW BACK PAIN WITHOUT SCIATICA: ICD-10-CM

## 2025-01-14 DIAGNOSIS — M79.604 PAIN OF RIGHT LOWER EXTREMITY: Primary | ICD-10-CM

## 2025-01-14 DIAGNOSIS — M54.50 CHRONIC BILATERAL LOW BACK PAIN WITHOUT SCIATICA: ICD-10-CM

## 2025-01-14 PROCEDURE — 97110 THERAPEUTIC EXERCISES: CPT | Mod: GP

## 2025-01-14 ASSESSMENT — PAIN SCALES - GENERAL
PAINLEVEL_OUTOF10: 0 - NO PAIN
PAINLEVEL_OUTOF10: 0 - NO PAIN

## 2025-01-14 ASSESSMENT — PAIN - FUNCTIONAL ASSESSMENT: PAIN_FUNCTIONAL_ASSESSMENT: 0-10

## 2025-01-14 NOTE — PROGRESS NOTES
"Physical Therapy    Physical Therapy Treatment - Recheck    Patient Name: Consuelo Andres  MRN: [id number]  Today's Date: 1/14/2025  Time Calculation  Start Time: 0950  Stop Time: 1030  Time Calculation (min): 40 min  PT Therapeutic Procedures Time Entry  Therapeutic Exercise Time Entry: 40  Auth Visit Dates: 11/8/24-11/8/25  VISIT# 2    Current Problem  Problem List Items Addressed This Visit             ICD-10-CM    Pain of right lower extremity - Primary M79.604    Relevant Orders    Follow Up In Physical Therapy    Chronic bilateral low back pain without sciatica M54.50, G89.29    Relevant Orders    Follow Up In Physical Therapy    Impaired gait and mobility R26.89    Relevant Orders    Follow Up In Physical Therapy       Subjective   Pt declines falls since last session. Pt reports noncompliance with HEP due to being forgetful and forgetting which exercises to be doing. Pt has not been seen since IE due to failure to schedule follow up appointments immediately following IE. Pt had illness over Christmas. 2 ER visits due to illness and weakness. Pt states RLE leg pain did go away but continues to have RLE plantar foot pain but is following up with podiatry 1/16/25. Pt notes she was vacuuming and deep cleaning house over the weekend without RLE or back pain. Pt states balance is still \"lousy\" without specific times/movements when she notices worsening of balance.    Precautions  Precautions  STEADI Fall Risk Score (The score of 4 or more indicates an increased risk of falling): 2  Medical Precautions:  (L TKA spring 2023)    Pain  Pain Assessment: 0-10  0-10 (Numeric) Pain Score: 0 - No pain  Pain Type: Chronic pain  Pain Location: Foot  Pain Orientation: Right  Pain Frequency: Intermittent  Multiple Pain Sites: Two  Pain Score 2: 0 - No pain  Pain Location 2: Back  Pain Orientation 2: Lower    Objective   Recheck this date.     Lower Extremity Strength:  LE strength not listed below is WNL  MMT 5/5 max  LEFT RIGHT " "  Hip Extension 4+ 5   Hip Abduction 5 at IE  Not formally assessed this date 4 at IE  Not formally assessed this date   Knee Extension 4 4   Knee Flexion 5 4     LE AROM: from IE 11/8/24  MMT 5/5 max  LEFT RIGHT   Knee Extension 0 0   Knee Flexion 130 degrees 130 degrees   90-90 Hamstring 55 degrees 55 degrees     Gait mechanics: Trunk flexion. Minimal hip flexion and knee extension anshu. Minimal weight shift to L side.    Palpation TTP at R and L sided mid IT band (R>L).    Single Leg Raise: 1x5 anshu. Pt reports no pain with good body mechanics demonstrated.     Bridges: x10 with RLE cramp in hamstring at rep #2.     Sit to Stand: Pushes up with anshu UEs on mat/chair without low back/leg pain throughout transitions in session.     Outcome Measures:  Other Measures  Lower Extremity Funtional Score (LEFS): 56/80     TREATMENT: HEP performed in session and issued for home. See below.   EXERCISES       Date 1/14/25             VISIT # #2 # # #    REPS REPS REPS REPS    Recheck.       Nustep              Shuttle  DLP       Shuttle SLP       Shuttle TR/HR              Qhip Flexion Standing 1x10 anshu      Qhip Abduction Standing 1x10 anshu      Qhip Extension Standing 1x10 anshu       Qhip  TKE              Balance Interventions       Backward Ambulation 2 laps without UE support      Lateral Ambulation  2 laps without UE support      Modified Tandem Ambulation  2 laps uni RUE support   SBA      Stepping over obstacles/boxes                      Piriformis Stretch        RB stretch       HS stretch 2x30\" anshu              PPT  PPT ABD/ADD  PPT with march       Bridges x10      Quad set + SLR X5 anshu             Step ups FW       Step ups lateral              Manual               HEP Updated. See below.         HEP  Access Code: [email or web address]  URL: [email or web address]  Date: 11/08/2024  Prepared by: Jacquelyn Marin  Exercises  - Supine Lower Trunk Rotation  - 1 x daily - 7 x weekly - 3 sets - 10 reps - 3-5 seconds " "hold  - Standing 'L' Stretch at Counter  - 1 x daily - 7 x weekly - 3 sets - 10 reps - 10-15 seconds hold  - Hooklying Single Knee to Chest Stretch  - 1 x daily - 7 x weekly - 3 sets - 10 reps  Updated: 1/14/25  - Supine Bridge  - 1 x daily - 7 x weekly - 3 sets - 10 reps  - Active Straight Leg Raise with Quad Set  - 1 x daily - 7 x weekly - 3 sets - 10 reps    Assessment:  Pt tolerates treatment session well reporting 0/10 pain at end of session with anshu LE \"tingling\" noting likely due to increased exercise this date since getting sick in December. Recheck this date since pt last seen 11/8/24. Some improvements in anshu LE strengthening due to reduction in RLE symptomology. HEP reviewed in session and updated with pt demonstrating and reporting good understanding. Pt demonstrates continued anshu LE hamstring cramping during bridging that reduces and pt able to complete interventions. SLR anshu without quad tendon pain. Pt would benefit from continued skilled PT services to initiate treatment and improve overall strength, balance, and mobility to reduce fall risk and maintain functional independence. Pt requests 1x/week at this time due to chances of bad weather.     OP EDUCATION:  Outpatient Education  Individual(s) Educated: Patient  Education Provided: Body Mechanics, Home Exercise Program, POC  Risk and Benefits Discussed with Patient/Caregiver/Other: yes  Patient/Caregiver Demonstrated Understanding: yes  Plan of Care Discussed and Agreed Upon: yes  Patient Response to Education: Patient/Caregiver Verbalized Understanding of Information, Patient/Caregiver Performed Return Demonstration of Exercises/Activities, Patient/Caregiver Asked Appropriate Questions  Education Comment: Pt education provided for the following: importance of HEP compliance with appropriate body mechanics, frequency, and intensity reviewed this date, benefits of updated HEP interventions, POC with aquatic PT at this time. Pt demonstrates and " "reports understanding.    Plan:  Extended POC due to initiation of treatment at this time  Continue to progress strengthening and balance interventions with aquatic PT   OP PT Plan  Treatment/Interventions: Education/ Instruction, Manual therapy, Mechanical traction, Neuromuscular re-education, Therapeutic activities, Therapeutic exercises, Ultrasound, Gait training, Aquatic therapy  PT Plan: Skilled PT  PT Frequency: 1 time per week  Duration: 6-8 weeks (from recheck 1/14/25)  Onset Date: 11/08/24  Certification Period Start Date: 11/08/24  Certification Period End Date: 11/08/25  Number of Treatments Authorized: Med Nec  Rehab Potential: Fair  Plan of Care Agreement: Patient    Goals:  Active       PT Problem       Patient will ambulate greater than or equal to 600 foot distance for community ambulation without increase in knee pain from baseline to demonstrate improvement in activity tolerance. (Progressing)       Start:  11/11/24    Expected End:  02/08/25         Goal Note       Pt ambulates throughout session without assistive device and without antalgic gait pattern or LOBx1. Not formally measured this date. Will assess at recheck.               Patient will achieve right hip abduction and knee flexion strength of 5/5 to improve functional mobility for yard work and household chores (Progressing)       Start:  11/11/24    Expected End:  02/08/25         Goal Note       R hip abduction not formally assessed this date. R knee flexion strength 4/5 (same as during IE). Pt without pain during MMT. Pt to begin strengthening in aquatic PT to progress LE strength.               Patient will demonstrate independence in home program for support of progression (Progressing)       Start:  11/11/24    Expected End:  02/08/25         Goal Note       Pt reports noncompliance with HEP due to \"being forgetful\". HEP reviewed and updated in session with pt demonstrating and reporting good understanding at this time.          "      Patient will report pain of less than or equal to 2/10 over two treatment sessions demonstrating a reduction of overall pain (Progressing)       Start:  11/11/24    Expected End:  02/08/25         Goal Note       Pt reports 0/10 pain this date at beginning and end of session with reduction in pain symptoms since IE 11/8/24. Will continue to monitor throughout POC.

## 2025-01-20 ENCOUNTER — TREATMENT (OUTPATIENT)
Dept: PHYSICAL THERAPY | Facility: HOSPITAL | Age: OVER 89
End: 2025-01-20
Payer: MEDICARE

## 2025-01-20 DIAGNOSIS — M62.81 MUSCLE WEAKNESS: Primary | ICD-10-CM

## 2025-01-20 DIAGNOSIS — M54.50 CHRONIC BILATERAL LOW BACK PAIN WITHOUT SCIATICA: ICD-10-CM

## 2025-01-20 DIAGNOSIS — M79.604 PAIN OF RIGHT LOWER EXTREMITY: ICD-10-CM

## 2025-01-20 DIAGNOSIS — G89.29 CHRONIC BILATERAL LOW BACK PAIN WITHOUT SCIATICA: ICD-10-CM

## 2025-01-20 DIAGNOSIS — R26.89 IMPAIRED GAIT AND MOBILITY: ICD-10-CM

## 2025-01-20 PROCEDURE — 97113 AQUATIC THERAPY/EXERCISES: CPT | Mod: GP,CQ

## 2025-01-20 ASSESSMENT — PAIN SCALES - GENERAL
PAINLEVEL_OUTOF10: 0 - NO PAIN
PAINLEVEL_OUTOF10: 0 - NO PAIN

## 2025-01-20 ASSESSMENT — PAIN - FUNCTIONAL ASSESSMENT: PAIN_FUNCTIONAL_ASSESSMENT: 0-10

## 2025-01-20 NOTE — PROGRESS NOTES
"Physical Therapy    Physical Therapy Treatment    Patient Name: Consuelo Andres  MRN: [id number]  Today's Date: 1/20/2025  Time Calculation  Start Time: 1515  Stop Time: 1600  Time Calculation (min): 45 min  VISIT 3  PT Therapeutic Procedures Time Entry  Aquatic Therapy Time Entry: 45    Assessment:  PT Assessment  Assessment Comment: pt reports no pain after treatment pt reports increased overall fatigue, good popstural awareness with treatment and good pace    Plan:  OP PT Plan  Treatment/Interventions: Education/ Instruction, Manual therapy, Mechanical traction, Neuromuscular re-education, Therapeutic activities, Therapeutic exercises, Ultrasound, Gait training, Aquatic therapy  PT Plan: Skilled PT  PT Frequency: 1 time per week  Duration: 6-8 weeks (from recheck 1/14/25)  Onset Date: 11/08/24  Certification Period Start Date: 11/08/24  Certification Period End Date: 11/08/25  Number of Treatments Authorized: Med Nec    Current Problem  1. Muscle weakness        2. Pain of right lower extremity  Follow Up In Physical Therapy      3. Chronic bilateral low back pain without sciatica  Follow Up In Physical Therapy      4. Impaired gait and mobility  Follow Up In Physical Therapy          Subjective  pt reports not having any pain today       Precautions  Precautions  Medical Precautions:  (L TKA spring 2023)     Pain  Pain Assessment: 0-10  0-10 (Numeric) Pain Score: 0 - No pain  Pain Type: Chronic pain  Pain Location: Leg  Pain Orientation: Right    Objective initiated aquatics        Treatments:     Aquatic Exercise  Aquatic Exercise Performed: Yes  Aquatic Exercise Activity 1: Amb Forw/Retro/Lat x 2 EA  Aquatic Exercise Activity 2: Marching x 2  Aquatic Exercise Activity 3: Squats x15  Aquatic Exercise Activity 4: TR/HR x 15 EA  Aquatic Exercise Activity 5: SLR x 15 EA F/E/L  Aquatic Exercise Activity 6: step ups x10 EA  Aquatic Exercise Activity 7: HS and Gastroc stretches 3 X 15\" EA  Aquatic Exercise Activity 8: " deep water biking and Distraction 5' EA Flex      Goals:  Physical Therapy - Urinary incontinence - July 2024 Problems       Physical Therapy - Urinary incontinence - July 2024 Problems (Active)       Urge incontinence & bowel incontinence       1) Pt to report reduction in bowel incontinence by >40% to reduce her skin irritation       Start:  08/02/24    Expected End:  10/25/24            2) Pt to report 50% reduction in urinary incontinence episodes to indicate increased bladder control       Start:  08/02/24    Expected End:  10/25/24            3) Pt to demonstrate good bladder habits to report decreased urinary frequency to <10x/day       Start:  08/02/24    Expected End:  10/25/24            4) Functional Outcome NIH CPSI score improves by >50% raw score to indicate improvement in subscales       Start:  08/02/24    Expected End:  10/25/24                 leg pain  Problems       leg pain  Problems (Active)       PT Problem       Patient will ambulate greater than or equal to 600 foot distance for community ambulation without increase in knee pain from baseline to demonstrate improvement in activity tolerance. (Progressing)       Start:  11/11/24    Expected End:  02/08/25         Goal Note       Pt ambulates throughout session without assistive device and without antalgic gait pattern or LOBx1. Not formally measured this date. Will assess at recheck.               Patient will achieve right hip abduction and knee flexion strength of 5/5 to improve functional mobility for yard work and household chores (Progressing)       Start:  11/11/24    Expected End:  02/08/25         Goal Note       R hip abduction not formally assessed this date. R knee flexion strength 4/5 (same as during IE). Pt without pain during MMT. Pt to begin strengthening in aquatic PT to progress LE strength.               Patient will demonstrate independence in home program for support of progression (Progressing)       Start:  11/11/24     "Expected End:  02/08/25         Goal Note       Pt reports noncompliance with HEP due to \"being forgetful\". HEP reviewed and updated in session with pt demonstrating and reporting good understanding at this time.               Patient will report pain of less than or equal to 2/10 over two treatment sessions demonstrating a reduction of overall pain (Progressing)       Start:  11/11/24    Expected End:  02/08/25         Goal Note       Pt reports 0/10 pain this date at beginning and end of session with reduction in pain symptoms since IE 11/8/24. Will continue to monitor throughout POC.                    "

## 2025-01-27 ENCOUNTER — TREATMENT (OUTPATIENT)
Dept: PHYSICAL THERAPY | Facility: HOSPITAL | Age: OVER 89
End: 2025-01-27
Payer: MEDICARE

## 2025-01-27 DIAGNOSIS — R26.89 IMPAIRED GAIT AND MOBILITY: ICD-10-CM

## 2025-01-27 DIAGNOSIS — M62.81 MUSCLE WEAKNESS: Primary | ICD-10-CM

## 2025-01-27 DIAGNOSIS — G89.29 CHRONIC BILATERAL LOW BACK PAIN WITHOUT SCIATICA: ICD-10-CM

## 2025-01-27 DIAGNOSIS — M79.604 PAIN OF RIGHT LOWER EXTREMITY: ICD-10-CM

## 2025-01-27 DIAGNOSIS — M54.50 CHRONIC BILATERAL LOW BACK PAIN WITHOUT SCIATICA: ICD-10-CM

## 2025-01-27 PROCEDURE — 97113 AQUATIC THERAPY/EXERCISES: CPT | Mod: GP,CQ

## 2025-01-27 ASSESSMENT — PAIN SCALES - GENERAL
PAINLEVEL_OUTOF10: 0 - NO PAIN
PAINLEVEL_OUTOF10: 0 - NO PAIN

## 2025-01-27 ASSESSMENT — PAIN - FUNCTIONAL ASSESSMENT: PAIN_FUNCTIONAL_ASSESSMENT: 0-10

## 2025-01-27 NOTE — PROGRESS NOTES
Physical Therapy    Physical Therapy Treatment    Patient Name: Consuelo Andres  MRN: [id number]  Today's Date: 1/27/2025  Time Calculation  Start Time: 1545  Stop Time: 1630  Time Calculation (min): 45 min  VISIT 4  PT Therapeutic Procedures Time Entry  Aquatic Therapy Time Entry: 45    Assessment:  PT Assessment  Assessment Comment: pt reports no pain, [place] LE fatigue and general heaviness, good pace and technique with exsvc for posture with amb    Plan:  OP PT Plan  Treatment/Interventions: Education/ Instruction, Manual therapy, Mechanical traction, Neuromuscular re-education, Therapeutic activities, Therapeutic exercises, Ultrasound, Gait training, Aquatic therapy  PT Plan:  (progress as tori for increased core strength)  PT Frequency: 1 time per week  Duration: 6-8 weeks (from recheck 1/14/25)  Onset Date: 11/08/24  Certification Period Start Date: 11/08/24  Certification Period End Date: 11/08/25  Number of Treatments Authorized: Med Nec    Current Problem  1. Muscle weakness        2. Pain of right lower extremity  Follow Up In Physical Therapy      3. Chronic bilateral low back pain without sciatica  Follow Up In Physical Therapy      4. Impaired gait and mobility  Follow Up In Physical Therapy          Subjective     Pt reports no pain but now is wearing a heart monitor but able to get it wet with showers she is going to call and make sure ok in  pool after this visit    Precautions  Precautions  Medical Precautions:  (L TKA spring 2023)     Pain  Pain Assessment: 0-10  0-10 (Numeric) Pain Score: 0 - No pain    Objective increased reps and added tray exs        Treatments:     Aquatic Exercise  Aquatic Exercise Performed: Yes  Aquatic Exercise Activity 1: Amb Forw/Retro/Lat x 2 EA  Aquatic Exercise Activity 2: Marching x 2  Aquatic Exercise Activity 3: Squats x20  Aquatic Exercise Activity 4: TR/HR x 1 EA  Aquatic Exercise Activity 5: SLR x 15 EA F/E/L  Aquatic Exercise Activity 6: step ups x10 EA  Aquatic  "Exercise Activity 7: HS and Gastroc stretches 3 X 15\" EA  Aquatic Exercise Activity 8: deep water biking and Distraction 5' EA Flex  Aquatic Exercise Activity 9: Tray exs x 10 EA T1 Push/pull, up/down      Goals:  Physical Therapy - Urinary incontinence - July 2024 Problems       Physical Therapy - Urinary incontinence - July 2024 Problems (Active)       Urge incontinence & bowel incontinence       1) Pt to report reduction in bowel incontinence by >40% to reduce her skin irritation       Start:  08/02/24    Expected End:  10/25/24            2) Pt to report 50% reduction in urinary incontinence episodes to indicate increased bladder control       Start:  08/02/24    Expected End:  10/25/24            3) Pt to demonstrate good bladder habits to report decreased urinary frequency to <10x/day       Start:  08/02/24    Expected End:  10/25/24            4) Functional Outcome NIH CPSI score improves by >50% raw score to indicate improvement in subscales       Start:  08/02/24    Expected End:  10/25/24                 leg pain  Problems       leg pain  Problems (Active)       PT Problem       Patient will ambulate greater than or equal to 600 foot distance for community ambulation without increase in knee pain from baseline to demonstrate improvement in activity tolerance. (Progressing)       Start:  11/11/24    Expected End:  02/08/25         Goal Note       Pt ambulates throughout session without assistive device and without antalgic gait pattern or LOBx1. Not formally measured this date. Will assess at recheck.               Patient will achieve right hip abduction and knee flexion strength of 5/5 to improve functional mobility for yard work and household chores (Progressing)       Start:  11/11/24    Expected End:  02/08/25         Goal Note       R hip abduction not formally assessed this date. R knee flexion strength 4/5 (same as during IE). Pt without pain during MMT. Pt to begin strengthening in aquatic PT to " "progress LE strength.               Patient will demonstrate independence in home program for support of progression (Progressing)       Start:  11/11/24    Expected End:  02/08/25         Goal Note       Pt reports noncompliance with HEP due to \"being forgetful\". HEP reviewed and updated in session with pt demonstrating and reporting good understanding at this time.               Patient will report pain of less than or equal to 2/10 over two treatment sessions demonstrating a reduction of overall pain (Progressing)       Start:  11/11/24    Expected End:  02/08/25         Goal Note       Pt reports 0/10 pain this date at beginning and end of session with reduction in pain symptoms since IE 11/8/24. Will continue to monitor throughout POC.                    "

## 2025-02-03 ENCOUNTER — APPOINTMENT (OUTPATIENT)
Dept: PHYSICAL THERAPY | Facility: HOSPITAL | Age: OVER 89
End: 2025-02-03
Payer: MEDICARE

## 2025-02-03 ENCOUNTER — DOCUMENTATION (OUTPATIENT)
Dept: PHYSICAL THERAPY | Facility: HOSPITAL | Age: OVER 89
End: 2025-02-03
Payer: MEDICARE

## 2025-02-03 NOTE — PROGRESS NOTES
Physical Therapy                 Therapy Communication Note    Patient Name: Consuelo Andres  MRN: [id number]  Department:   Room: Room/bed info not found  Today's Date: 2/3/2025     Discipline: Physical Therapy          Missed Visit Reason:      Missed Time: Cancel    Comment: open wound

## 2025-02-04 ENCOUNTER — TELEPHONE (OUTPATIENT)
Dept: PRIMARY CARE | Facility: CLINIC | Age: OVER 89
End: 2025-02-04

## 2025-02-07 ENCOUNTER — TREATMENT (OUTPATIENT)
Dept: PHYSICAL THERAPY | Facility: HOSPITAL | Age: OVER 89
End: 2025-02-07
Payer: MEDICARE

## 2025-02-07 DIAGNOSIS — M62.81 MUSCLE WEAKNESS: Primary | ICD-10-CM

## 2025-02-07 DIAGNOSIS — R26.89 IMPAIRED GAIT AND MOBILITY: ICD-10-CM

## 2025-02-07 DIAGNOSIS — M54.50 CHRONIC BILATERAL LOW BACK PAIN WITHOUT SCIATICA: ICD-10-CM

## 2025-02-07 DIAGNOSIS — G89.29 CHRONIC BILATERAL LOW BACK PAIN WITHOUT SCIATICA: ICD-10-CM

## 2025-02-07 DIAGNOSIS — M79.604 PAIN OF RIGHT LOWER EXTREMITY: ICD-10-CM

## 2025-02-07 PROCEDURE — 97113 AQUATIC THERAPY/EXERCISES: CPT | Mod: GP,CQ

## 2025-02-07 ASSESSMENT — PAIN SCALES - GENERAL
PAINLEVEL_OUTOF10: 0 - NO PAIN
PAINLEVEL_OUTOF10: 0 - NO PAIN

## 2025-02-07 ASSESSMENT — PAIN - FUNCTIONAL ASSESSMENT: PAIN_FUNCTIONAL_ASSESSMENT: 0-10

## 2025-02-07 NOTE — PROGRESS NOTES
"Physical Therapy    Physical Therapy Treatment    Patient Name: Consuelo Andres  MRN: [id number]  Today's Date: 2/7/2025  Time Calculation  Start Time: 1110  Stop Time: 1155  Time Calculation (min): 45 min  VISIT 5  PT Therapeutic Procedures Time Entry  Aquatic Therapy Time Entry: 45    Assessment:  PT Assessment  Assessment Comment: no pain with increases, increased VC for posture and technique today, pt having a more difficult time staying on task    Plan:  OP PT Plan  Treatment/Interventions: Education/ Instruction, Manual therapy, Mechanical traction, Neuromuscular re-education, Therapeutic activities, Therapeutic exercises, Ultrasound, Gait training, Aquatic therapy  PT Plan:  (add wand exs if able next visit)  PT Frequency: 1 time per week  Duration: 6-8 weeks (from recheck 1/14/25)  Onset Date: 11/08/24  Certification Period Start Date: 11/08/24  Certification Period End Date: 11/08/25  Number of Treatments Authorized: Med Nec    Current Problem  1. Muscle weakness        2. Pain of right lower extremity  Follow Up In Physical Therapy      3. Chronic bilateral low back pain without sciatica  Follow Up In Physical Therapy      4. Impaired gait and mobility  Follow Up In Physical Therapy          Subjective         Precautions  Precautions  Medical Precautions:  (L TKA spring 2023)     Pain  Pain Assessment: 0-10  0-10 (Numeric) Pain Score: 0 - No pain    Objective         Treatments:     Aquatic Exercise  Aquatic Exercise Performed: Yes  Aquatic Exercise Activity 1: Amb Forw/Retro/Lat x 3 EA  Aquatic Exercise Activity 2: Marching x 2  Aquatic Exercise Activity 3: Squats x20  Aquatic Exercise Activity 4: TR/HR x 20 EA  Aquatic Exercise Activity 5: SLR x 20 EA F/E/L  Aquatic Exercise Activity 6: step ups x15 EA  Aquatic Exercise Activity 7: HS and Gastroc stretches 3 X 15\" EA  Aquatic Exercise Activity 8: deep water biking and Distraction 5' EA Flex  Aquatic Exercise Activity 9: Tray exs x 15 EA T1 Push/pull, " up/down      Goals:  Physical Therapy - Urinary incontinence - July 2024 Problems       Physical Therapy - Urinary incontinence - July 2024 Problems (Active)       Urge incontinence & bowel incontinence       1) Pt to report reduction in bowel incontinence by >40% to reduce her skin irritation       Start:  08/02/24    Expected End:  10/25/24            2) Pt to report 50% reduction in urinary incontinence episodes to indicate increased bladder control       Start:  08/02/24    Expected End:  10/25/24            3) Pt to demonstrate good bladder habits to report decreased urinary frequency to <10x/day       Start:  08/02/24    Expected End:  10/25/24            4) Functional Outcome NIH CPSI score improves by >50% raw score to indicate improvement in subscales       Start:  08/02/24    Expected End:  10/25/24                 leg pain  Problems       leg pain  Problems (Active)       PT Problem       Patient will ambulate greater than or equal to 600 foot distance for community ambulation without increase in knee pain from baseline to demonstrate improvement in activity tolerance. (Progressing)       Start:  11/11/24    Expected End:  02/08/25         Goal Note       Pt ambulates throughout session without assistive device and without antalgic gait pattern or LOBx1. Not formally measured this date. Will assess at recheck.               Patient will achieve right hip abduction and knee flexion strength of 5/5 to improve functional mobility for yard work and household chores (Progressing)       Start:  11/11/24    Expected End:  02/08/25         Goal Note       R hip abduction not formally assessed this date. R knee flexion strength 4/5 (same as during IE). Pt without pain during MMT. Pt to begin strengthening in aquatic PT to progress LE strength.               Patient will demonstrate independence in home program for support of progression (Progressing)       Start:  11/11/24    Expected End:  02/08/25         Goal  "Note       Pt reports noncompliance with HEP due to \"being forgetful\". HEP reviewed and updated in session with pt demonstrating and reporting good understanding at this time.               Patient will report pain of less than or equal to 2/10 over two treatment sessions demonstrating a reduction of overall pain (Progressing)       Start:  11/11/24    Expected End:  02/08/25         Goal Note       Pt reports 0/10 pain this date at beginning and end of session with reduction in pain symptoms since IE 11/8/24. Will continue to monitor throughout POC.                    "

## 2025-02-11 ENCOUNTER — DOCUMENTATION (OUTPATIENT)
Dept: PHYSICAL THERAPY | Facility: HOSPITAL | Age: OVER 89
End: 2025-02-11
Payer: MEDICARE

## 2025-02-11 ENCOUNTER — APPOINTMENT (OUTPATIENT)
Dept: PHYSICAL THERAPY | Facility: HOSPITAL | Age: OVER 89
End: 2025-02-11
Payer: MEDICARE

## 2025-02-11 NOTE — PROGRESS NOTES
Physical Therapy                 Therapy Communication Note    Patient Name: Consuelo Andres  MRN: [id number]  Today's Date: 2/11/2025     Discipline: Physical Therapy    Missed Time: Cancel    Missed Visit Reason:  Pt called to cancel recheck 5 minutes past start time due to forgetting about appointment. Recheck rescheduled for 2/13/25 at 1:45PM.

## 2025-02-13 ENCOUNTER — TREATMENT (OUTPATIENT)
Dept: PHYSICAL THERAPY | Facility: HOSPITAL | Age: OVER 89
End: 2025-02-13
Payer: MEDICARE

## 2025-02-13 DIAGNOSIS — M79.604 PAIN OF RIGHT LOWER EXTREMITY: Primary | ICD-10-CM

## 2025-02-13 DIAGNOSIS — R26.89 IMPAIRED GAIT AND MOBILITY: ICD-10-CM

## 2025-02-13 DIAGNOSIS — G89.29 CHRONIC BILATERAL LOW BACK PAIN WITHOUT SCIATICA: ICD-10-CM

## 2025-02-13 DIAGNOSIS — M54.50 CHRONIC BILATERAL LOW BACK PAIN WITHOUT SCIATICA: ICD-10-CM

## 2025-02-13 PROCEDURE — 97110 THERAPEUTIC EXERCISES: CPT | Mod: GP

## 2025-02-13 ASSESSMENT — PAIN - FUNCTIONAL ASSESSMENT: PAIN_FUNCTIONAL_ASSESSMENT: 0-10

## 2025-02-13 ASSESSMENT — PAIN SCALES - GENERAL
PAINLEVEL_OUTOF10: 8
PAINLEVEL_OUTOF10: 0 - NO PAIN

## 2025-02-13 NOTE — PROGRESS NOTES
"Physical Therapy    Physical Therapy Treatment - Recheck     Patient Name: Consuelo Andres  MRN: [id number]  Today's Date: 2/13/2025  Time Calculation  Start Time: 1345  Stop Time: 1426  Time Calculation (min): 41 min  PT Therapeutic Procedures Time Entry  Therapeutic Exercise Time Entry: 41  Auth Visit Dates: 1/14/25-1/14/26  VISIT# 6    Current Problem  Problem List Items Addressed This Visit             ICD-10-CM    Pain of right lower extremity - Primary M79.604    Chronic bilateral low back pain without sciatica M54.50, G89.29    Impaired gait and mobility R26.89       Subjective   Pt declines falls since last session. Pt reports she has not been completing HEP \"faithfully\". Pt states she has had some back pain on and off due to standing and making tie blankets for [facility] but was able to take seated rest breaks to assist with pain. Pt reports she does not get RLE radiating pain any more. Pt reports she has liked aquatic PT services but would like to begin more land based PT interventions at this time.     Precautions  Precautions  STEADI Fall Risk Score (The score of 4 or more indicates an increased risk of falling): 2  Medical Precautions:  (L TKA spring 2023)    Pain  Pain Assessment: 0-10  0-10 (Numeric) Pain Score: 0 - No pain  Pain Location: Leg  Pain Orientation: Right  Pain Score 2: 8 (\"it comes and goes and does not stay\")  Pain Location 2: Back    Objective   Recheck this date.     Lower Extremity Strength:  LE strength not listed below is WNL  MMT 5/5 max  LEFT RIGHT   Hip Extension 4+ 5   Hip Abduction 5 4+    Knee Extension 4+ 4+   Knee Flexion 5 5     LE AROM: from IE 11/8/24  MMT 5/5 max  LEFT RIGHT   Knee Extension 0 0   Knee Flexion 130 degrees 130 degrees   90-90 Hamstring 55 degrees 55 degrees     Gait mechanics: Trunk flexion. Minimal hip flexion and knee extension anshu.     Bridges: x10 with good body mechanics and without cramping in hamstrings this date.     Sit to Stand: " "Pushes up with anshu UEs on mat/chair without low back/leg pain throughout transitions in session.     Outcome Measures:  Other Measures  Lower Extremity Funtional Score (LEFS): 68/80     TREATMENT:   EXERCISES       Date 1/14/25 2/13/25            VISIT # #2 #5 # #    REPS REPS REPS REPS    Recheck.  Recheck.      Nustep  L2 10 min             Shuttle  DLP       Shuttle SLP       Shuttle TR/HR              Qhip Flexion Standing 1x10 anshu      Qhip Abduction Standing 1x10 anshu      Qhip Extension Standing 1x10 anshu       Qhip  TKE              Balance Interventions       Backward Ambulation 2 laps without UE support      Lateral Ambulation  2 laps without UE support      Modified Tandem Ambulation  2 laps uni RUE support   SBA      Stepping over obstacles/boxes                      Piriformis Stretch        RB stretch       HS stretch 2x30\" anshu              PPT  PPT ABD/ADD  PPT with march  X10 3\" H  X10 3\" H ea  X10 3\" H anshu      Bridges x10 Resisted bridge with PPT 3\" H x10      Quad set + SLR X5 anshu X 5 anshu             Step ups FW       Step ups lateral              Manual               HEP Updated. See below.  Updated. See below.        HEP  Access Code: [email or web address]  URL: [email or web address]  Date: 11/08/2024  Prepared by: Jacquelyn Marin  Exercises  - Supine Lower Trunk Rotation  - 1 x daily - 7 x weekly - 3 sets - 10 reps - 3-5 seconds hold  - Standing 'L' Stretch at Counter  - 1 x daily - 7 x weekly - 3 sets - 10 reps - 10-15 seconds hold  - Hooklying Single Knee to Chest Stretch  - 1 x daily - 7 x weekly - 3 sets - 10 reps  Updated: 1/14/25  - Supine Bridge  - 1 x daily - 7 x weekly - 3 sets - 10 reps  - Active Straight Leg Raise with Quad Set  - 1 x daily - 7 x weekly - 3 sets - 10 reps  Updated: 2/13/25  - Pelvic Tilt  - 1 x daily - 7 x weekly - 3 sets - 10 reps - 3 seconds hold  - Supine Hip Adduction Isometric with Ball  - 1 x daily - 7 x weekly - 3 sets - 10 reps - 3 seconds hold  - Bridge " with Hip Abduction and Resistance  - 1 x daily - 7 x weekly - 3 sets - 10 reps - 3 seconds hold  - Hooklying Clamshell with Resistance  - 1 x daily - 7 x weekly - 3 sets - 10 reps  - Supine March with Posterior Pelvic Tilt  - 1 x daily - 7 x weekly - 3 sets - 10 reps - 3 seconds hold    Assessment:  Pt tolerates treatment session well reporting 0/10 back pain at end of session. Recheck this date demonstrates improvements in anshu knee flexion and extension strength and hip strength as anshu LE strength is greater than or equal to 4+/5 for MMT globally. Pt is progressing in all goals set by PT at this time with the exception of HEP IND as pt reports she has not been compliant at home. Updated HEP to include more core strengthening interventions to improve pt tolerance in standing and walking to maintain activity levels and functional independence to reduce risk of sedentary lifestyle. Pt would benefit from continued skilled PT services to initiate land based PT services for continued core, back, and anshu LE strengthening to reduce back pain during activities to improve quality of life and maintain functional independence.    OP EDUCATION:  Outpatient Education  Individual(s) Educated: Patient  Education Provided: Body Mechanics, Home Exercise Program, POC  Risk and Benefits Discussed with Patient/Caregiver/Other: yes  Patient/Caregiver Demonstrated Understanding: yes  Plan of Care Discussed and Agreed Upon: yes  Patient Response to Education: Patient/Caregiver Verbalized Understanding of Information, Patient/Caregiver Asked Appropriate Questions, Patient/Caregiver Performed Return Demonstration of Exercises/Activities  Education Comment: Pt education provided for the following: trunk anatomy and relation to pain with weak musculature at LEs and core, updated HEP interventions for core strengthening with appropriate body mechanics, intensity, and frequency, importance of HEP compliance to reduce back pain. Pt demonstrates  and reports understanding at this time.    Plan:  Initiate land based PT services for continued core, back, and anshu LE strengthening to reduce back pain during activities to improve quality of life and maintian functional independence  OP PT Plan  Treatment/Interventions: Education/ Instruction, Manual therapy, Mechanical traction, Neuromuscular re-education, Therapeutic activities, Therapeutic exercises, Ultrasound, Gait training, Aquatic therapy  PT Plan:  (Initiate land based PT servcies for 1 more month)  PT Frequency: 1 time per week  Duration: 6-8 weeks (from recheck 1/14/25)  Onset Date: 11/08/24  Certification Period Start Date: 11/08/24  Certification Period End Date: 11/08/25  Number of Treatments Authorized: Med Nec  Rehab Potential: Fair  Plan of Care Agreement: Patient    Goals:  Active       PT Problem       Patient will ambulate greater than or equal to 600 foot distance for community ambulation without increase in knee pain from baseline to demonstrate improvement in activity tolerance. (Progressing)       Start:  11/11/24    Expected End:  04/13/25         Goal Note       Pt ambulates into, throughout, and out of session without reports of increased pain or required seated rest break. Distances not formally measured. Will continue to monitor and progress throughout remaining POC.               Patient will achieve right hip abduction and knee flexion strength of 5/5 to improve functional mobility for yard work and household chores (Progressing)       Start:  11/11/24    Expected End:  04/13/25         Goal Note       Improvements in anshu LE strength evidenced by MMT scores greater than or 4+/5 globally.              Patient will demonstrate independence in home program for support of progression (Not Progressing)       Start:  11/11/24    Expected End:  04/13/25         Goal Note       Pt reports she has not been compliant with HEP interventions.               Patient will report pain of less than or  equal to 2/10 over two treatment sessions demonstrating a reduction of overall pain (Progressing)       Start:  11/11/24    Expected End:  04/13/25         Goal Note       Pt reporting pain levels reaching an 8/10 intermittently at home. Will continue to monitor throughout remainder of POC.

## 2025-02-25 ENCOUNTER — TREATMENT (OUTPATIENT)
Dept: PHYSICAL THERAPY | Facility: HOSPITAL | Age: OVER 89
End: 2025-02-25
Payer: MEDICARE

## 2025-02-25 DIAGNOSIS — R26.89 IMPAIRED GAIT AND MOBILITY: ICD-10-CM

## 2025-02-25 DIAGNOSIS — M54.50 CHRONIC BILATERAL LOW BACK PAIN WITHOUT SCIATICA: ICD-10-CM

## 2025-02-25 DIAGNOSIS — M79.604 PAIN OF RIGHT LOWER EXTREMITY: Primary | ICD-10-CM

## 2025-02-25 DIAGNOSIS — G89.29 CHRONIC BILATERAL LOW BACK PAIN WITHOUT SCIATICA: ICD-10-CM

## 2025-02-25 PROCEDURE — 97110 THERAPEUTIC EXERCISES: CPT | Mod: GP

## 2025-02-25 ASSESSMENT — PAIN SCALES - GENERAL
PAINLEVEL_OUTOF10: 0 - NO PAIN
PAINLEVEL_OUTOF10: 0 - NO PAIN

## 2025-02-25 ASSESSMENT — PAIN - FUNCTIONAL ASSESSMENT: PAIN_FUNCTIONAL_ASSESSMENT: 0-10

## 2025-02-25 NOTE — PROGRESS NOTES
"Physical Therapy    Physical Therapy Treatment    Patient Name: Consuelo Andres  MRN: [id number]  Today's Date: 2/25/2025  Time Calculation  Start Time: 1429  Stop Time: 1512  Time Calculation (min): 43 min  PT Therapeutic Procedures Time Entry  Therapeutic Exercise Time Entry: 43  Auth Visit Dates: 11/8/24-11/8/25  VISIT# 7    Current Problem  Problem List Items Addressed This Visit             ICD-10-CM       Musculoskeletal and Injuries    Pain of right lower extremity - Primary M79.604    Chronic bilateral low back pain without sciatica M54.50, G89.29       Symptoms and Signs    Impaired gait and mobility R26.89       Subjective   Pt declines falls since last session. Pt reports \"not faithful but yes\" with completion of HEP . Pt reports when completing her supine lower trunk rotation this morning she had a pain in her left chest although reports it has improved and is no longer having pain once sitting upright following intervention. Pt notes she was laying with anshu UEs 90 degrees in shoulder abduction and extended on bed during intervention.    Precautions  Precautions  STEADI Fall Risk Score (The score of 4 or more indicates an increased risk of falling): 2  Medical Precautions:  (L TKA spring 2023)    Pain  Pain Assessment: 0-10  0-10 (Numeric) Pain Score: 0 - No pain  Pain Location: Leg  Pain Orientation: Right  Pain Score 2: 0 - No pain  Pain Location 2: Back    Objective   Initiation of DL/SL shuttle  Initiation of FW step ups  Progression of Qhip resistance    TREATMENT:   EXERCISES       Date 1/14/25 2/13/25 2/25/25           VISIT # #2 #5 #6 #    REPS REPS REPS REPS    Recheck.  Recheck.      Nustep  L2 10 min  L2 10 min           Shuttle  DLP   5B 2x10    Shuttle SLP   4B 2x10    Shuttle TR/HR              Qhip Flexion Standing 1x10 anshu  Red Tband 1x10    Qhip Abduction Standing 1x10 anshu  Red Tband 1x10    Qhip Extension Standing 1x10 anshu   Red Tband 1x10    Qhip  TKE              Balance Interventions   " "    Backward Ambulation 2 laps without UE support  3 laps no UE support    Lateral Ambulation  2 laps without UE support  3 laps no UE support    Modified Tandem Ambulation  2 laps uni RUE support   SBA  3 laps single UE support    Stepping over obstacles/boxes    3 laps in // bars                  Piriformis Stretch        RB stretch       HS stretch 2x30\" north              PPT  PPT ABD/ADD  PPT with march  X10 3\" H  X10 3\" H ea  X10 3\" H north    X10 3\" H ea  X10 3\" H north     Bridges x10 Resisted bridge with PPT 3\" H x10  Resisted bridge with PPT 3\" H 2x10 Red Tband    Quad set + SLR X5 north X 5 north  X10 North           Step ups FW   6in 1x10 North    Step ups lateral              Manual               HEP Updated. See below.  Updated. See below.        HEP  Access Code: [email or web address]  URL: [email or web address]  Date: 11/08/2024  Prepared by: Jacquelyn Marin  Exercises  - Supine Lower Trunk Rotation  - 1 x daily - 7 x weekly - 3 sets - 10 reps - 3-5 seconds hold  - Standing 'L' Stretch at Counter  - 1 x daily - 7 x weekly - 3 sets - 10 reps - 10-15 seconds hold  - Hooklying Single Knee to Chest Stretch  - 1 x daily - 7 x weekly - 3 sets - 10 reps  Updated: 1/14/25  - Supine Bridge  - 1 x daily - 7 x weekly - 3 sets - 10 reps  - Active Straight Leg Raise with Quad Set  - 1 x daily - 7 x weekly - 3 sets - 10 reps  Updated: 2/13/25  - Pelvic Tilt  - 1 x daily - 7 x weekly - 3 sets - 10 reps - 3 seconds hold  - Supine Hip Adduction Isometric with Ball  - 1 x daily - 7 x weekly - 3 sets - 10 reps - 3 seconds hold  - Bridge with Hip Abduction and Resistance  - 1 x daily - 7 x weekly - 3 sets - 10 reps - 3 seconds hold  - Hooklying Clamshell with Resistance  - 1 x daily - 7 x weekly - 3 sets - 10 reps  - Supine March with Posterior Pelvic Tilt  - 1 x daily - 7 x weekly - 3 sets - 10 reps - 3 seconds hold    Assessment:  Pt tolerates treatment session well reporting 0/10 pain at end of session. Pt with fatigue in " RLE > LLE with SL shuttle. Pt with cramp in R hamstring towards end of reps with resisted bridges. Treatment session provided by DEANDRA Hidalgo. The supervising therapist, Jacquelyn Marin, PT, DPT, was present throughout session and made all clinical decisions.     OP EDUCATION:  Outpatient Education  Individual(s) Educated: Patient  Education Provided: Body Mechanics, Home Exercise Program, POC  Risk and Benefits Discussed with Patient/Caregiver/Other: yes  Plan of Care Discussed and Agreed Upon: yes  Patient Response to Education: Patient/Caregiver Verbalized Understanding of Information, Patient/Caregiver Asked Appropriate Questions  Education Comment: Pt education provided for the following: Importance of compliance with HEP to reduce back pain, body mechanics. Pt demonstrates good understanding this date.    Plan:  Continue to progress core, back, and anshu LE strengthening to reduce back pain during activities to improve quality of life and maintian functional independence.   OP PT Plan  Treatment/Interventions: Education/ Instruction, Manual therapy, Mechanical traction, Neuromuscular re-education, Therapeutic activities, Therapeutic exercises, Ultrasound, Gait training, Aquatic therapy  PT Plan:  (Initiate land based PT servcies for 1 more month)  PT Frequency: 1 time per week  Duration: 6-8 weeks (from recheck 1/14/25)  Onset Date: 11/08/24  Certification Period Start Date: 11/08/24  Certification Period End Date: 11/08/25  Number of Treatments Authorized: Med Nec  Rehab Potential: Fair  Plan of Care Agreement: Patient    Goals:  Active       PT Problem       Patient will ambulate greater than or equal to 600 foot distance for community ambulation without increase in knee pain from baseline to demonstrate improvement in activity tolerance. (Progressing)       Start:  11/11/24    Expected End:  04/13/25            Patient will achieve right hip abduction and knee flexion strength of 5/5 to improve functional  mobility for yard work and household chores (Progressing)       Start:  11/11/24    Expected End:  04/13/25            Patient will demonstrate independence in home program for support of progression (Not Progressing)       Start:  11/11/24    Expected End:  04/13/25            Patient will report pain of less than or equal to 2/10 over two treatment sessions demonstrating a reduction of overall pain (Progressing)       Start:  11/11/24    Expected End:  04/13/25

## 2025-03-04 ENCOUNTER — TREATMENT (OUTPATIENT)
Dept: PHYSICAL THERAPY | Facility: HOSPITAL | Age: OVER 89
End: 2025-03-04
Payer: MEDICARE

## 2025-03-04 DIAGNOSIS — G89.29 CHRONIC BILATERAL LOW BACK PAIN WITHOUT SCIATICA: ICD-10-CM

## 2025-03-04 DIAGNOSIS — M79.604 PAIN OF RIGHT LOWER EXTREMITY: Primary | ICD-10-CM

## 2025-03-04 DIAGNOSIS — R26.89 IMPAIRED GAIT AND MOBILITY: ICD-10-CM

## 2025-03-04 DIAGNOSIS — M54.50 CHRONIC BILATERAL LOW BACK PAIN WITHOUT SCIATICA: ICD-10-CM

## 2025-03-04 PROCEDURE — 97110 THERAPEUTIC EXERCISES: CPT | Mod: GP

## 2025-03-04 ASSESSMENT — PAIN SCALES - GENERAL
PAINLEVEL_OUTOF10: 0 - NO PAIN
PAINLEVEL_OUTOF10: 0 - NO PAIN

## 2025-03-04 ASSESSMENT — PAIN - FUNCTIONAL ASSESSMENT: PAIN_FUNCTIONAL_ASSESSMENT: 0-10

## 2025-03-04 NOTE — PROGRESS NOTES
"Physical Therapy    Physical Therapy Treatment    Patient Name: Consuelo Andres  MRN: [id number]  Today's Date: 3/4/2025  Time Calculation  Start Time: 1343  Stop Time: 1427  Time Calculation (min): 44 min  PT Therapeutic Procedures Time Entry  Therapeutic Exercise Time Entry: 44  Auth Visit Dates: 11/8/24-11/8/25  VISIT# 8    Current Problem  Problem List Items Addressed This Visit             ICD-10-CM       Musculoskeletal and Injuries    Pain of right lower extremity - Primary M79.604    Chronic bilateral low back pain without sciatica M54.50, G89.29       Symptoms and Signs    Impaired gait and mobility R26.89     Subjective   Pt declines falls since last session. Pt reports she is doing \"some\"of her HEP. Pt reports she had some pain in her back when sewing last night standing for around 1 hour to 1.5 hours    Precautions  Precautions  STEADI Fall Risk Score (The score of 4 or more indicates an increased risk of falling): 2  Medical Precautions:  (L TKA spring 2023)    Pain  Pain Assessment: 0-10  0-10 (Numeric) Pain Score: 0 - No pain  Pain Location: Leg  Pain Orientation: Right  Pain Score 2: 0 - No pain  Pain Location 2: Back    Objective   Initiation of marches on foam  Progression of Qhip reps and resistance  Progression of shuttle DLP/SLP resistance    TREATMENT:   EXERCISES       Date 1/14/25 2/13/25 2/25/25 3/4/25          VISIT # #2 #5 #6 #7    REPS REPS REPS REPS    Recheck.  Recheck.      Nustep  L2 10 min  L2 10 min L3 10 min          Shuttle  DLP   5B 2x10 6B 2x10   Shuttle SLP   4B 2x10 5B 2x10   Shuttle TR/HR              Qhip Flexion Standing 1x10 anshu  Red Tband 1x10 Green Tband 2x10   Qhip Abduction Standing 1x10 anshu  Red Tband 1x10 Green Tband 2x10   Qhip Extension Standing 1x10 anshu   Red Tband 1x10 Green Tband 2x10   Qhip  TKE              Balance Interventions       Backward Ambulation 2 laps without UE support  3 laps no UE support 3 laps no UE support   Lateral Ambulation  2 laps without UE " "support  3 laps no UE support 3 laps green Tband    Modified Tandem Ambulation  2 laps uni RUE support   SBA  3 laps single UE support 3 laps   Stepping over obstacles/boxes    3 laps in // bars 3 laps in // bars   Marches on foam    2x10          Piriformis Stretch        RB stretch       HS stretch 2x30\" north              PPT  PPT ABD/ADD  PPT with march  X10 3\" H  X10 3\" H ea  X10 3\" H north    X10 3\" H ea  X10 3\" H north     Bridges x10 Resisted bridge with PPT 3\" H x10  Resisted bridge with PPT 3\" H 2x10 Red Tband    Quad set + SLR X5 north X 5 north  X10 North 2x10 3 lbs          Step ups FW   6in 1x10 North    Step ups lateral              Manual               HEP Updated. See below.  Updated. See below.        HEP  Access Code: [email or web address]  URL: [email or web address]  Date: 11/08/2024  Prepared by: Jacquelyn Marin  Exercises  - Supine Lower Trunk Rotation  - 1 x daily - 7 x weekly - 3 sets - 10 reps - 3-5 seconds hold  - Standing 'L' Stretch at Counter  - 1 x daily - 7 x weekly - 3 sets - 10 reps - 10-15 seconds hold  - Hooklying Single Knee to Chest Stretch  - 1 x daily - 7 x weekly - 3 sets - 10 reps  Updated: 1/14/25  - Supine Bridge  - 1 x daily - 7 x weekly - 3 sets - 10 reps  - Active Straight Leg Raise with Quad Set  - 1 x daily - 7 x weekly - 3 sets - 10 reps  Updated: 2/13/25  - Pelvic Tilt  - 1 x daily - 7 x weekly - 3 sets - 10 reps - 3 seconds hold  - Supine Hip Adduction Isometric with Ball  - 1 x daily - 7 x weekly - 3 sets - 10 reps - 3 seconds hold  - Bridge with Hip Abduction and Resistance  - 1 x daily - 7 x weekly - 3 sets - 10 reps - 3 seconds hold  - Hooklying Clamshell with Resistance  - 1 x daily - 7 x weekly - 3 sets - 10 reps  - Supine March with Posterior Pelvic Tilt  - 1 x daily - 7 x weekly - 3 sets - 10 reps - 3 seconds hold    Assessment:  Pt tolerates progression of treatment and initiation of interventions well in session reporting 0/10 pain at end of session. Treatment " session provided by DEANDRA Hidalgo. The supervising therapist, Jacquelyn Marin, PT, DPT, was present throughout session and made all clinical decisions.     OP EDUCATION:  Outpatient Education  Individual(s) Educated: Patient  Education Provided: Body Mechanics, Home Exercise Program, POC  Risk and Benefits Discussed with Patient/Caregiver/Other: yes  Plan of Care Discussed and Agreed Upon: yes  Patient Response to Education: Patient/Caregiver Verbalized Understanding of Information, Patient/Caregiver Asked Appropriate Questions  Education Comment: Pt education provided for the following: Importance of compliance with HEP to reduce back pain, body mechanics. Pt demonstrates good understanding this date.    Plan:  Continue to progress core, back, and anshu LE strengthening to reduce back pain during activities to improve quality of life and maintian functional independence.  OP PT Plan  Treatment/Interventions: Education/ Instruction, Manual therapy, Mechanical traction, Neuromuscular re-education, Therapeutic activities, Therapeutic exercises, Ultrasound, Gait training, Aquatic therapy  PT Plan: Skilled PT  PT Frequency: 1 time per week  Duration: 6-8 weeks (from recheck 1/14/25)  Onset Date: 11/08/24  Certification Period Start Date: 11/08/24  Certification Period End Date: 11/08/25  Number of Treatments Authorized: [facility]  Rehab Potential: Fair  Plan of Care Agreement: Patient    Goals:  Active       PT Problem       Patient will ambulate greater than or equal to 600 foot distance for community ambulation without increase in knee pain from baseline to demonstrate improvement in activity tolerance. (Progressing)       Start:  11/11/24    Expected End:  04/13/25            Patient will achieve right hip abduction and knee flexion strength of 5/5 to improve functional mobility for yard work and household chores (Progressing)       Start:  11/11/24    Expected End:  04/13/25            Patient will demonstrate  independence in home program for support of progression (Progressing)       Start:  11/11/24    Expected End:  04/13/25            Patient will report pain of less than or equal to 2/10 over two treatment sessions demonstrating a reduction of overall pain (Progressing)       Start:  11/11/24    Expected End:  04/13/25

## 2025-03-11 ENCOUNTER — TREATMENT (OUTPATIENT)
Dept: PHYSICAL THERAPY | Facility: HOSPITAL | Age: OVER 89
End: 2025-03-11
Payer: MEDICARE

## 2025-03-11 DIAGNOSIS — G89.29 CHRONIC BILATERAL LOW BACK PAIN WITHOUT SCIATICA: ICD-10-CM

## 2025-03-11 DIAGNOSIS — M54.50 CHRONIC BILATERAL LOW BACK PAIN WITHOUT SCIATICA: ICD-10-CM

## 2025-03-11 DIAGNOSIS — M79.604 PAIN OF RIGHT LOWER EXTREMITY: Primary | ICD-10-CM

## 2025-03-11 DIAGNOSIS — R26.89 IMPAIRED GAIT AND MOBILITY: ICD-10-CM

## 2025-03-11 PROCEDURE — 97110 THERAPEUTIC EXERCISES: CPT | Mod: GP

## 2025-03-11 ASSESSMENT — PAIN - FUNCTIONAL ASSESSMENT: PAIN_FUNCTIONAL_ASSESSMENT: 0-10

## 2025-03-11 ASSESSMENT — PAIN SCALES - GENERAL
PAINLEVEL_OUTOF10: 0 - NO PAIN
PAINLEVEL_OUTOF10: 0 - NO PAIN

## 2025-03-11 NOTE — PROGRESS NOTES
Physical Therapy    Physical Therapy Treatment    Patient Name: Consuelo Andres  MRN: [id number]  Today's Date: 3/11/2025  Time Calculation  Start Time: 1300  Stop Time: 1343  Time Calculation (min): 43 min  PT Therapeutic Procedures Time Entry  Therapeutic Exercise Time Entry: 43  Auth Visit Dates: 11/8/24-11/8/25  VISIT# 9    Current Problem  Problem List Items Addressed This Visit             ICD-10-CM       Musculoskeletal and Injuries    Pain of right lower extremity - Primary M79.604    Chronic bilateral low back pain without sciatica M54.50, G89.29       Symptoms and Signs    Impaired gait and mobility R26.89     Subjective   Pt declines falls since last session. Pt reports compliance with HEP daily. Pt reports she was able to clean the house prior to PT session today without an ache in the back.    Precautions  Precautions  STEADI Fall Risk Score (The score of 4 or more indicates an increased risk of falling): 2  Medical Precautions:  (L TKA spring 2023)    Pain  Pain Assessment: 0-10  0-10 (Numeric) Pain Score: 0 - No pain  Pain Location: Leg  Pain Orientation: Right  Pain Score 2: 0 - No pain  Pain Location 2: Back    Objective   Progression of shuttle reps and initiation of HR's  Progression to 3 way Qhip  Progression of resisted bridges resistance    TREATMENT:   EXERCISES       Date 2/13/25 2/25/25 3/4/25 3/11/25          VISIT # #5 #6 #7 #8    REPS REPS REPS REPS    Recheck.       Nustep L2 10 min  L2 10 min L3 10 min L3 10 min          Shuttle  DLP  5B 2x10 6B 2x10 6B 2x15   Shuttle SLP  4B 2x10 5B 2x10 5B 2x15 RLE  5B 2x11 LLE   Shuttle TR/HR    5B 2x10           Qhip Flexion  Red Tband 1x10 Green Tband 2x10 10 # 2x10   Qhip Abduction  Red Tband 1x10 Green Tband 2x10 10 # 2x10   Qhip Extension  Red Tband 1x10 Green Tband 2x10 10 # 2x10   Qhip  TKE              Balance Interventions       Backward Ambulation  3 laps no UE support 3 laps no UE support 3 laps no UE support   Lateral Ambulation   3 laps no  "UE support 3 laps green Tband  3 laps green Tband   Modified Tandem Ambulation   3 laps single UE support 3 laps 3 laps single UE support   Stepping over obstacles/boxes   3 laps in // bars 3 laps in // bars 3 laps in // bars single UE support   Marches on foam   2x10 2x10 single UE support          Piriformis Stretch        RB stretch       HS stretch              PPT  PPT ABD/ADD  PPT with march X10 3\" H  X10 3\" H ea  X10 3\" H north    X10 3\" H ea  X10 3\" H north      Bridges Resisted bridge with PPT 3\" H x10  Resisted bridge with PPT 3\" H 2x10 Red Tband  Resisted bridge with PPT 3\" H 2x10 Green Tband   Quad set + SLR X 5 north  X10 North 2x10 3 lbs 1x10 3 lbs North          Step ups FW  6in 1x10 North     Step ups lateral              Manual               HEP Updated. See below.         HEP  Access Code: [email or web address]  URL: [email or web address]  Date: 11/08/2024  Prepared by: Jacquelyn Marin  Exercises  - Supine Lower Trunk Rotation  - 1 x daily - 7 x weekly - 3 sets - 10 reps - 3-5 seconds hold  - Standing 'L' Stretch at Counter  - 1 x daily - 7 x weekly - 3 sets - 10 reps - 10-15 seconds hold  - Hooklying Single Knee to Chest Stretch  - 1 x daily - 7 x weekly - 3 sets - 10 reps  Updated: 1/14/25  - Supine Bridge  - 1 x daily - 7 x weekly - 3 sets - 10 reps  - Active Straight Leg Raise with Quad Set  - 1 x daily - 7 x weekly - 3 sets - 10 reps  Updated: 2/13/25  - Pelvic Tilt  - 1 x daily - 7 x weekly - 3 sets - 10 reps - 3 seconds hold  - Supine Hip Adduction Isometric with Ball  - 1 x daily - 7 x weekly - 3 sets - 10 reps - 3 seconds hold  - Bridge with Hip Abduction and Resistance  - 1 x daily - 7 x weekly - 3 sets - 10 reps - 3 seconds hold  - Hooklying Clamshell with Resistance  - 1 x daily - 7 x weekly - 3 sets - 10 reps  - Supine March with Posterior Pelvic Tilt  - 1 x daily - 7 x weekly - 3 sets - 10 reps - 3 seconds hold    Assessment:  Pt tolerates treatment session well reporting 0/10 pain at end " of session. Pt with difficulty completing single leg LLE due to prior knee surgery. Pt requiring verbal cues for knee extension with shuttle HR's. Pt with increased fatigue this date due to cleaning house prior to session limiting progression of some interventions this date. Pt with LOBx2 throughout tandem ambulation but able to recover IND with uni UE support on parallel bar. Treatment session provided by DEANDRA Hidalgo. The supervising therapist, Jacquelyn Marin, PT, DPT, was present throughout session and made all clinical decisions.     OP EDUCATION:  Outpatient Education  Individual(s) Educated: Patient  Education Provided: Body Mechanics, Home Exercise Program, POC  Risk and Benefits Discussed with Patient/Caregiver/Other: yes  Plan of Care Discussed and Agreed Upon: yes  Patient Response to Education: Patient/Caregiver Verbalized Understanding of Information, Patient/Caregiver Asked Appropriate Questions  Education Comment: Pt education provided for the following: Porper mechanics throughout session, continued compliance with HEP. Pt demonstrates good understanding this date.    Plan:  Continue to progress core, back, and anshu LE strengthening to reduce back pain during activities to improve quality of life and maintian functional independence.  OP PT Plan  Treatment/Interventions: Education/ Instruction, Manual therapy, Mechanical traction, Neuromuscular re-education, Therapeutic activities, Therapeutic exercises, Ultrasound, Gait training, Aquatic therapy  PT Plan: Skilled PT  PT Frequency: 1 time per week  Duration: 6-8 weeks (from recheck 1/14/25)  Onset Date: 11/08/24  Certification Period Start Date: 11/08/24  Certification Period End Date: 11/08/25  Number of Treatments Authorized: Med Nec  Rehab Potential: Fair  Plan of Care Agreement: Patient    Goals:  Active       PT Problem       Patient will ambulate greater than or equal to 600 foot distance for community ambulation without increase in knee pain  from baseline to demonstrate improvement in activity tolerance. (Progressing)       Start:  11/11/24    Expected End:  04/13/25            Patient will achieve right hip abduction and knee flexion strength of 5/5 to improve functional mobility for yard work and household chores (Progressing)       Start:  11/11/24    Expected End:  04/13/25            Patient will demonstrate independence in home program for support of progression (Progressing)       Start:  11/11/24    Expected End:  04/13/25            Patient will report pain of less than or equal to 2/10 over two treatment sessions demonstrating a reduction of overall pain (Progressing)       Start:  11/11/24    Expected End:  04/13/25

## 2025-03-18 ENCOUNTER — TREATMENT (OUTPATIENT)
Dept: PHYSICAL THERAPY | Facility: HOSPITAL | Age: OVER 89
End: 2025-03-18
Payer: MEDICARE

## 2025-03-18 DIAGNOSIS — M79.604 PAIN OF RIGHT LOWER EXTREMITY: Primary | ICD-10-CM

## 2025-03-18 DIAGNOSIS — M54.50 CHRONIC BILATERAL LOW BACK PAIN WITHOUT SCIATICA: ICD-10-CM

## 2025-03-18 DIAGNOSIS — G89.29 CHRONIC BILATERAL LOW BACK PAIN WITHOUT SCIATICA: ICD-10-CM

## 2025-03-18 DIAGNOSIS — R26.89 IMPAIRED GAIT AND MOBILITY: ICD-10-CM

## 2025-03-18 PROCEDURE — 97110 THERAPEUTIC EXERCISES: CPT | Mod: GP

## 2025-03-18 ASSESSMENT — PAIN DESCRIPTION - DESCRIPTORS: DESCRIPTORS: SHARP

## 2025-03-18 ASSESSMENT — PAIN SCALES - GENERAL
PAINLEVEL_OUTOF10: 5 - MODERATE PAIN
PAINLEVEL_OUTOF10: 0 - NO PAIN

## 2025-03-18 ASSESSMENT — PAIN - FUNCTIONAL ASSESSMENT: PAIN_FUNCTIONAL_ASSESSMENT: 0-10

## 2025-03-18 NOTE — PROGRESS NOTES
Physical Therapy    Physical Therapy Treatment    Patient Name: Consuelo Andres  MRN: [id number]  Today's Date: 3/18/2025  Time Calculation  Start Time: 1300  Stop Time: 1343  Time Calculation (min): 43 min  PT Therapeutic Procedures Time Entry  Therapeutic Exercise Time Entry: 43  Auth Visit Dates: 11/8/24-11/8/25  VISIT# 10    Current Problem  Problem List Items Addressed This Visit             ICD-10-CM       Musculoskeletal and Injuries    Pain of right lower extremity - Primary M79.604    Chronic bilateral low back pain without sciatica M54.50, G89.29       Symptoms and Signs    Impaired gait and mobility R26.89       Subjective   Pt declines falls since last session. Pt reports she has not done her exercises since Thursday with HEP. Pt reports she was helping set up a [employer] seminar for [employer] for 10 hours on Friday and taking down on Saturday for another 10 hours. Pt reports she has had an increase in pain/soreness due to increases in activities. Pt reports she is not sure what she is going to be able to do this date as she has an increase in fatigue levels.    Precautions  Precautions  STEADI Fall Risk Score (The score of 4 or more indicates an increased risk of falling): 2  Medical Precautions:  (L TKA spring 2023)    Pain  Pain Assessment: 0-10  0-10 (Numeric) Pain Score: 0 - No pain  Pain Location: Leg  Pain Orientation: Right  Pain Score 2: 5 - Moderate pain  Pain Location 2: Back  Pain Descriptors 2: Sharp    Objective   Progression of Nustep resistance  Increase in shuttle HR reps  Increase in Qhip weight    TREATMENT:   EXERCISES       Date 2/25/25 3/4/25 3/11/25 3/18/25          VISIT # #6 #7 #8 #9    REPS REPS REPS Reps          Nustep L2 10 min L3 10 min L3 10 min L4 10 min          Shuttle  DLP 5B 2x10 6B 2x10 6B 2x15 6B 2x15   Shuttle SLP 4B 2x10 5B 2x10 5B 2x15 RLE  5B 2x11 LLE 5 B 2x15 North   Shuttle TR/HR   5B 2x10  5B 2x15          Qhip Flexion Red Tband 1x10 Green Tband 2x10 10 # 2x10 15#  "2x10   Qhip Abduction Red Tband 1x10 Green Tband 2x10 10 # 2x10 15# 2x10   Qhip Extension Red Tband 1x10 Green Tband 2x10 10 # 2x10 15# 2x10   Qhip  TKE              Balance Interventions       Backward Ambulation 3 laps no UE support 3 laps no UE support 3 laps no UE support    Lateral Ambulation  3 laps no UE support 3 laps green Tband  3 laps green Tband 3 laps green Tband single UE support   Modified Tandem Ambulation  3 laps single UE support 3 laps 3 laps single UE support 3 laps single UE support   Stepping over obstacles/boxes  3 laps in // bars 3 laps in // bars 3 laps in // bars single UE support    Marches on foam  2x10 2x10 single UE support           DKT with SB    Green x10   LTR with SB    Green x10          Piriformis Stretch        RB stretch       HS stretch              PPT  PPT ABD/ADD  PPT with march   X10 3\" H ea  X10 3\" H north       Bridges Resisted bridge with PPT 3\" H 2x10 Red Tband  Resisted bridge with PPT 3\" H 2x10 Green Tband    Quad set + SLR X10 North 2x10 3 lbs 1x10 3 lbs North           Step ups FW 6in 1x10 North      Step ups lateral              Manual               HEP         HEP  Access Code: [email or web address]  URL: [email or web address]  Date: 11/08/2024  Prepared by: Jacquelyn Marin  Exercises  - Supine Lower Trunk Rotation  - 1 x daily - 7 x weekly - 3 sets - 10 reps - 3-5 seconds hold  - Standing 'L' Stretch at Counter  - 1 x daily - 7 x weekly - 3 sets - 10 reps - 10-15 seconds hold  - Hooklying Single Knee to Chest Stretch  - 1 x daily - 7 x weekly - 3 sets - 10 reps  Updated: 1/14/25  - Supine Bridge  - 1 x daily - 7 x weekly - 3 sets - 10 reps  - Active Straight Leg Raise with Quad Set  - 1 x daily - 7 x weekly - 3 sets - 10 reps  Updated: 2/13/25  - Pelvic Tilt  - 1 x daily - 7 x weekly - 3 sets - 10 reps - 3 seconds hold  - Supine Hip Adduction Isometric with Ball  - 1 x daily - 7 x weekly - 3 sets - 10 reps - 3 seconds hold  - Bridge with Hip Abduction and " Resistance  - 1 x daily - 7 x weekly - 3 sets - 10 reps - 3 seconds hold  - Hooklying Clamshell with Resistance  - 1 x daily - 7 x weekly - 3 sets - 10 reps  - Supine March with Posterior Pelvic Tilt  - 1 x daily - 7 x weekly - 3 sets - 10 reps - 3 seconds hold    Assessment:  Pt tolerates treatment session well reporting 0/10 pain at end of session. Pt requiring seated rest breaks throughout session due to increased fatigue levels this date. Treatment session provided by DEANDRA Hidalgo. The supervising therapist, Jacquelyn Marin, PT, DPT, was present throughout session and made all clinical decisions.     OP EDUCATION:  Outpatient Education  Individual(s) Educated: Patient  Education Provided: Body Mechanics, Home Exercise Program, POC  Risk and Benefits Discussed with Patient/Caregiver/Other: yes  Plan of Care Discussed and Agreed Upon: yes  Patient Response to Education: Patient/Caregiver Verbalized Understanding of Information, Patient/Caregiver Asked Appropriate Questions  Education Comment: Pt education provided for the following: Porper mechanics throughout session, continued compliance with HEP. Pt demonstrates good understanding this date.    Plan:  Continue to progress core, back, and anshu LE strengthening to reduce back pain during activities to improve quality of life and maintian functional independence.  Reassess progress towards goals and anticipate discharge  OP PT Plan  Treatment/Interventions: Education/ Instruction, Manual therapy, Mechanical traction, Neuromuscular re-education, Therapeutic activities, Therapeutic exercises, Ultrasound, Gait training, Aquatic therapy  PT Plan: Skilled PT  PT Frequency: 1 time per week  Duration: 6-8 weeks (from recheck 1/14/25)  Onset Date: 11/08/24  Certification Period Start Date: 11/08/24  Certification Period End Date: 11/08/25  Number of Treatments Authorized: Med Nec  Rehab Potential: Fair  Plan of Care Agreement: Patient    Goals:  Active       PT Problem        Patient will ambulate greater than or equal to 600 foot distance for community ambulation without increase in knee pain from baseline to demonstrate improvement in activity tolerance. (Progressing)       Start:  11/11/24    Expected End:  04/13/25            Patient will achieve right hip abduction and knee flexion strength of 5/5 to improve functional mobility for yard work and household chores (Progressing)       Start:  11/11/24    Expected End:  04/13/25            Patient will demonstrate independence in home program for support of progression (Progressing)       Start:  11/11/24    Expected End:  04/13/25            Patient will report pain of less than or equal to 2/10 over two treatment sessions demonstrating a reduction of overall pain (Progressing)       Start:  11/11/24    Expected End:  04/13/25

## 2025-03-20 ENCOUNTER — OFFICE VISIT (OUTPATIENT)
Facility: HOSPITAL | Age: OVER 89
End: 2025-03-20
Payer: MEDICARE

## 2025-03-20 VITALS — SYSTOLIC BLOOD PRESSURE: 136 MMHG | HEART RATE: 92 BPM | RESPIRATION RATE: 20 BRPM | DIASTOLIC BLOOD PRESSURE: 73 MMHG

## 2025-03-20 DIAGNOSIS — M79.604 PAIN OF RIGHT LOWER EXTREMITY: Primary | ICD-10-CM

## 2025-03-20 DIAGNOSIS — M79.605 PAIN IN BOTH LOWER EXTREMITIES: ICD-10-CM

## 2025-03-20 DIAGNOSIS — M79.604 PAIN IN BOTH LOWER EXTREMITIES: ICD-10-CM

## 2025-03-20 PROCEDURE — 3075F SYST BP GE 130 - 139MM HG: CPT | Performed by: PHYSICAL MEDICINE & REHABILITATION

## 2025-03-20 PROCEDURE — 1159F MED LIST DOCD IN RCRD: CPT | Performed by: PHYSICAL MEDICINE & REHABILITATION

## 2025-03-20 PROCEDURE — 1126F AMNT PAIN NOTED NONE PRSNT: CPT | Performed by: PHYSICAL MEDICINE & REHABILITATION

## 2025-03-20 PROCEDURE — 99213 OFFICE O/P EST LOW 20 MIN: CPT | Performed by: PHYSICAL MEDICINE & REHABILITATION

## 2025-03-20 PROCEDURE — 1036F TOBACCO NON-USER: CPT | Performed by: PHYSICAL MEDICINE & REHABILITATION

## 2025-03-20 PROCEDURE — 3078F DIAST BP <80 MM HG: CPT | Performed by: PHYSICAL MEDICINE & REHABILITATION

## 2025-03-20 ASSESSMENT — PAIN SCALES - GENERAL: PAINLEVEL_OUTOF10: 0-NO PAIN

## 2025-03-20 NOTE — PROGRESS NOTES
Physical Medicine and Rehabilitation [facility]   03/20/25       Chief Complaint:  Low back pain     HPI:  Consuelo Andres is a  89 y.o. F who presents to the clinic today  for evaluation of leg  Onset: R lateral side and bottom of foot and left medial lower leg pain. Started few months ago.  Location: mostly R lateral lower leg  Radiation: as above  No pain in thighs, intermittent low back pain   Quality: dull pain and sharp and comes and goes  Duration: intermittent  Aggravating factors:  sewing and pushing down on pedal and being busy  Walking, activities, bending, working in yard    Alleviating factors: not sure  Severity: 0 comes and goes  Numbness/Tingling: Yes - R arm  Bowel or bladder incontinence: Yes - chronic bladder  Pt's current medication regimen includes: gabapentin 100 mg for few weeks wo relief, not sure if had side effects     Treatment to date:  Physical therapy: No  Medications taken to date for this complaint include the following:   none  Prior injections:[facility] spine, But had left [facility] injection        Does have numbness in the hand and arm on the R,.   Not sure it helps. Has a splint, two months,. No pain neck or upper arm. Follows w [facility].     Drives    Xr l spine reviewed,. Significant spondylosis.  R leg pain is better.   She does have back pain w activities like vacuuming was ok w rest.  Pain w working in the yard.  R forearm numbness.  Had therapy eval but no follow ups not clear.      She was last seen in dec 2024. At that time we discussed:  1. Xr l spine reviewed w patient and personally, on personal review spondylosis and facet arthropathy  2. PT for core rom hep, had one eval and no follow up, contacted therapist to help set up more sessions and also work on proper lifting techniques  3. Lidocaine cream or patch prn  4. Voltaren gel prn  5. Gabapentin made her too drowsy  6. Renal insufficiency would try and avoid oral nsaids      She completed therapy. She is doing land therapy.  She volunteered over the weekend and did ok especially after.  Therapy is on back and legs. Not having any pain in the LE.     Reviewed pcp note and PT note.      Imaging  Reviewed 03/20/25 w patient and personally  Xr l spin3 10/2024  FINDINGS:  Lumbar spine, five views      There is moderate multilevel disc space narrowing osteophytosis  throughout the lumbar spine. There is moderate facet disease as well.  There is mild scoliosis of the lumbar spine. No fracture seen. There  is minimal retrolisthesis of L1 on L2 and L2 on L3      IMPRESSION:      The multilevel spondylosis and moderate facet disease worse in the  lower lumbar spine. Minimal retrolisthesis L1 on L2 and L2 on L3   Xr tibia 8/2024  FINDINGS:  No fracture dislocation or bone lesion. Ankle joint and knee joint  are normal the extent of visualization. Soft tissues are normal.      IMPRESSION:  No abnormality  Past Medical History:   Diagnosis Date    Acute follicular conjunctivitis, bilateral 07/05/2024    Anesthesia of skin     Numbness and tingling in right hand    Diverticulitis 01/13/2024    Personal history of diseases of the blood and blood-forming organs and certain disorders involving the immune mechanism     History of anemia    Personal history of other medical treatment 05/06/2021    History of screening mammography    Unspecified cataract     Cataracts, bilateral        Past Surgical History:   Procedure Laterality Date    BLADDER SURGERY  09/07/2017    Bladder Surgery    BREAST BIOPSY  09/07/2017    Biopsy Breast Open    CARDIAC SURGERY  09/07/2017    Heart Surgery    COLONOSCOPY  11/2024    FOOT SURGERY  09/07/2017    Foot Surgery    HYSTERECTOMY  09/07/2017    Hysterectomy    OTHER SURGICAL HISTORY  09/05/2022    Uvulopalatopharyngoplasty    OTHER SURGICAL HISTORY  03/06/2020    Surgery    OTHER SURGICAL HISTORY  11/09/2019    Cardiac catheterization    OTHER SURGICAL HISTORY  11/09/2019    Tonsillectomy        Patient Active Problem  List    Diagnosis Date Noted    Influenza A 01/07/2025    Chronic bilateral low back pain without sciatica 11/08/2024    Impaired gait and mobility 11/08/2024    Exertional dyspnea 08/14/2024    Pelvic floor dysfunction 08/02/2024    Muscle weakness 08/02/2024    Urge incontinence of urine 05/07/2024    Mild protein-calorie malnutrition (Multi) 02/13/2024    History of anemia 12/18/2023    Injury of free lower extremity 12/18/2023    Routine general medical examination at health care facility 12/18/2023    Breast tenderness 11/04/2023    Dry eye syndrome, bilateral 11/04/2023    Dermatitis 06/12/2023    Unknown cause of injury 06/12/2023    Colloid thyroid nodule 06/12/2023    Acute sinusitis 06/12/2023    Acute viral syndrome 06/12/2023    Contact dermatitis, allergic 06/12/2023    Facial rash 06/12/2023    Skin lesions 06/12/2023    Cystitis 06/12/2023    Nipple pain 06/12/2023    Other chest pain 06/12/2023    Impacted cerumen of right ear 06/12/2023    Cortical cataract of right eye 06/12/2023    Diverticulosis 06/12/2023    Gastroesophageal reflux disease 06/12/2023    Muscle cramps 06/12/2023    Muscle weakness of lower extremity 06/12/2023    Nocturnal leg cramps 06/12/2023    Mitral valve prolapse 06/12/2023    Abdominal pain, LLQ (left lower quadrant) 04/28/2023    Abnormal CT scan, colon 04/28/2023    Abrasion 04/28/2023    Accidental fall 04/28/2023    Acute diverticulitis 04/28/2023    Acute generalized abdominal pain 04/28/2023    Acute maxillary sinusitis 04/28/2023    Acute nasopharyngitis 04/28/2023    Acute pancreatitis 04/28/2023    Acute UTI 04/28/2023    Adhesive capsulitis of shoulder 04/28/2023    Allergic contact dermatitis due to adhesives 04/28/2023    Anemia 04/28/2023    Anxiety 04/28/2023    Arthritis 04/28/2023    Bilateral impacted cerumen 04/28/2023    Carpal tunnel syndrome of right wrist 04/28/2023    Cataract, left 04/28/2023    Clindamycin adverse reaction 04/28/2023    Contact  dermatitis due to plant 04/28/2023    Diaphragmatic hernia 04/28/2023    Diarrhea, unspecified 04/28/2023    Disorder of bone and cartilage 04/28/2023    Disorder of skin or subcutaneous tissue 04/28/2023    Diverticulosis of large intestine 04/28/2023    Dysuria 04/28/2023    Ecchymosis 04/28/2023    Fecal incontinence 04/28/2023    Flatulence, eructation, and gas pain 04/28/2023    GERD without esophagitis 04/28/2023    Hair loss 04/28/2023    Hematuria 04/28/2023    Hyperlipidemia 04/28/2023    Hypertension 04/28/2023    Hypothyroidism 04/28/2023    Incomplete bladder emptying 04/28/2023    Increased frequency of urination 04/28/2023    Laceration of right hand without foreign body 04/28/2023    Left knee pain 04/28/2023    Lesion of spleen 04/28/2023    Memory loss 04/28/2023    Midline cystocele 04/28/2023    Mitral valve disease 04/28/2023    Limb cramp 04/28/2023    Myalgia 04/28/2023    Neck pain 04/28/2023    Atypical chest pain 04/28/2023    Nocturia 04/28/2023    Nontoxic single thyroid nodule 04/28/2023    Obstructive sleep apnea 04/28/2023    Osteoarthritis of knee 04/28/2023    Pain in both feet 04/28/2023    Pain in joint involving pelvic region and thigh 04/28/2023    Pain of right lower extremity 04/28/2023    Poison ivy 04/28/2023    Postmenopausal bone loss 04/28/2023    Primary dysthymia 04/28/2023    Rectocele, female 04/28/2023    Restless legs syndrome 04/28/2023    Right hand paresthesia 04/28/2023    Antibiotic-induced allergic rash 04/28/2023    Skin sensation disturbance 04/28/2023    Swallowing disorder 04/28/2023    Swelling of left lower extremity 04/28/2023    Unsteady gait 04/28/2023    URI (upper respiratory infection) 04/28/2023    Vision changes 04/28/2023    Vitamin D deficiency 04/28/2023    Watery stools 04/28/2023    Dehydration 05/12/2022    Monocular diplopia of left eye 07/21/2021    Neurogenic bladder 09/21/2015    Urethral stricture 09/21/2015    Functional disorder of  "bladder 09/02/2015    Carcinoma in situ of skin 01/10/2012    Inflamed seborrheic keratosis 01/10/2012    Actinic keratosis 11/03/2011    Photoaged skin 10/04/2011        Family History   Problem Relation Name Age of Onset    Other (cva) Mother      Coronary artery disease Father      Coronary artery disease Brother      Other (malignant neoplasm) Brother      Stomach cancer Brother          Current Outpatient Medications   Medication Sig Dispense Refill    cholecalciferol (Vitamin D-3) 25 MCG (1000 UT) tablet Take 1 tablet (1,000 Units) by mouth once daily. 90 tablet 1    famotidine (Pepcid) 40 mg tablet Take 1 tablet (40 mg) by mouth once daily. 90 tablet 1    levothyroxine (Synthroid, Levoxyl) 75 mcg tablet Take 1 tablet (75 mcg) by mouth once daily. 90 tablet 1    metoprolol succinate XL (Toprol-XL) 25 mg 24 hr tablet Take 1 tablet (25 mg) by mouth once daily. 90 tablet 3    multivitamin (Daily Multi-Vitamin) tablet Take 1 tablet by mouth once daily.       No current facility-administered medications for this visit.        Allergies   Allergen Reactions    Penicillins Anaphylaxis and Hives    Caffeine Other and Unknown    Ciprofloxacin Diarrhea    Donepezil Unknown     \"Aricept - Alzheimer's\"    Fexofenadine Unknown     \"Antihistamine\"    Lanolin Unknown     \"Sheep's wool\"    burning    Lipase-Protease-Amylase Unknown     \"Digestive enzymes\"    Pt states she can't remember, but that she knows she had a reaction.    Methen-M.Blue-S.Phos-Phsal-Hyo Unknown     \"Relieves discomfort, swelling, pain, frequent urge to urinate\"    Other reaction(s): Other (See Comments)   Pain in stomach,difficulty urinating,headache    Methylprednisolone Unknown    Mirabegron Unknown     \"Myrbetriq - Overactive bladder\"    Naproxen Unknown    Norfloxacin Other     \"Antibiotic\"    Rivastigmine Unknown     \"Dementia medication\"    Clindamycin Itching and Rash    Nitrofurantoin Rash    Ofloxacin Rash    Quinolones Hives, Diarrhea and Rash " "    \"Antibiotic\"    Sulfamethoxazole Rash    Trimethoprim Rash        Social History     Socioeconomic History    Marital status:    Tobacco Use    Smoking status: Never    Smokeless tobacco: Never   Vaping Use    Vaping status: Never Used   Substance and Sexual Activity    Alcohol use: Not Currently     Comment: back in her 20s    Drug use: Never     Social Drivers of Health     Food Insecurity: No Food Insecurity (1/7/2025)    Hunger Vital Sign     Worried About Running Out of Food in the Last Year: Never true     Ran Out of Food in the Last Year: Never true   Lives alone     Review of Systems:  Constitutional:  Denies fever or chills, malaise, weight changes.   Eyes:  Denies change in visual acuity   HENT:  Denies nasal congestion or sore throat   Respiratory:  Denies cough or shortness of breath   Cardiovascular:  Denies chest pain or edema   GI:  Denies abdominal pain, nausea, vomiting, bloody stools or diarrhea   :  Denies dysuria   Integument:  Denies rash   Neurologic:  As per HPI  MSK: Per above HPI  Endocrine:  Denies polyuria or polydipsia   Lymphatic:  Denies swollen glands   Psychiatric:  Denies depression or anxiety            PHYSICAL EXAM:  LMP  (LMP Unknown)     General:  NAD, well developed, F      Psychiatric: appropriate mood & affect.   Cardiovascular:  Normal pedal pulses; no LE edema.  Respiratory:  Normal rate; unlabored breathing.  Skin:  Intact; no erythema; no ecchymosis or rash.  Lymphatic:  No lymphadenopathy or lymphedema.  NEURO:  Alert and appropriate. Speech fluent, conversing appropriately.   Motor:    Rt: HF 5/5, KE 5/5, KF 5/5, DF 5/5, EHL 5/5, PF 5/5.    Lt: HF 5/5, KE 5/5, KF 5/5, DF 5/5, EHL 5/5, PF 5/5.  Sensation:     Light touch: intact in the b/l L3-S1 dermatomes.    PP: intact in the b/l L3-S1 dermatomes  Reflexes:     Right:  patellar 2+, achilles 2+,     Left: patellar 2+, achilles 2+,     Babinski's downgoing b/l; no clonus  Gait: antalgic    MSK:  Inspection " reveals no evidence of a pelvic obliqity   Flexed forward  Spinal range of motion: Flexion to 30° degrees, Extension of 10 degrees.  There is mild tenderness over the paraspinals bl.   Special tests:    Straight leg raise: -bl    Slump test: -bl    Facet loading: -bl          Impression: Consuelo Andres is a 89 y.o. F w pmh of hlp, gerd presenting with distal LE pain that could be neurogenic in nature thought difficult to replicate on exam. Improved w therapy.  Plan:     1. Xr l spine reviewed w patient and personally, on personal review spondylosis and facet arthropathy  2. PT for core rom hep, had one eval and no follow up, contacted therapist to help set up more sessions and also work on proper lifting techniques; almost done and doing very well and does her HEP  3. Lidocaine cream or patch prn  4. Voltaren gel prn  5. Gabapentin made her too drowsy  6. Renal insufficiency would try and avoid oral nsaids   Fu w pcp re fatigue  Fu prn      Anne Streeter MD  Physical Medicine and Rehabilitation

## 2025-03-24 ENCOUNTER — TELEPHONE (OUTPATIENT)
Dept: PRIMARY CARE | Facility: CLINIC | Age: OVER 89
End: 2025-03-24
Payer: MEDICARE

## 2025-03-24 NOTE — TELEPHONE ENCOUNTER
Pt called informing Dr WATSON that this morning when she woke up she was dizzy and lightheaded.      Pt's neighbor who is an RN took her BP and she reported that it was normal.      Pt feels better now.  Pt states she has never felt this dizziness before and wanted to make Dr WATSON aware.      Pt has an office visit scheduled for 4/8/2025

## 2025-03-25 ENCOUNTER — APPOINTMENT (OUTPATIENT)
Dept: PHYSICAL THERAPY | Facility: HOSPITAL | Age: OVER 89
End: 2025-03-25
Payer: MEDICARE

## 2025-03-25 ENCOUNTER — DOCUMENTATION (OUTPATIENT)
Dept: PHYSICAL THERAPY | Facility: HOSPITAL | Age: OVER 89
End: 2025-03-25
Payer: MEDICARE

## 2025-03-25 NOTE — PROGRESS NOTES
Physical Therapy                 Therapy Communication Note    Patient Name: Consuelo Andres  MRN: [id number]  Today's Date: 3/25/2025     Discipline: Physical Therapy    Missed Time: Cancel    Missed Visit Reason:  Pt called to cancel due to dizziness and states she will call to reschedule.

## 2025-04-02 LAB
25(OH)D3+25(OH)D2 SERPL-MCNC: 52 NG/ML (ref 30–100)
ALBUMIN SERPL-MCNC: 4.4 G/DL (ref 3.6–5.1)
ALBUMIN/CREAT UR: 25 MG/G CREAT
ALP SERPL-CCNC: 63 U/L (ref 37–153)
ALT SERPL-CCNC: 13 U/L (ref 6–29)
ANION GAP SERPL CALCULATED.4IONS-SCNC: 6 MMOL/L (CALC) (ref 7–17)
AST SERPL-CCNC: 20 U/L (ref 10–35)
BASOPHILS # BLD AUTO: 57 CELLS/UL (ref 0–200)
BASOPHILS NFR BLD AUTO: 0.8 %
BILIRUB SERPL-MCNC: 1 MG/DL (ref 0.2–1.2)
BUN SERPL-MCNC: 23 MG/DL (ref 7–25)
CALCIUM SERPL-MCNC: 9.6 MG/DL (ref 8.6–10.4)
CHLORIDE SERPL-SCNC: 103 MMOL/L (ref 98–110)
CHOLEST SERPL-MCNC: 198 MG/DL
CHOLEST/HDLC SERPL: 3 (CALC)
CO2 SERPL-SCNC: 31 MMOL/L (ref 20–32)
CREAT SERPL-MCNC: 0.93 MG/DL (ref 0.6–0.95)
CREAT UR-MCNC: 120 MG/DL (ref 20–275)
EGFRCR SERPLBLD CKD-EPI 2021: 58 ML/MIN/1.73M2
EOSINOPHIL # BLD AUTO: 192 CELLS/UL (ref 15–500)
EOSINOPHIL NFR BLD AUTO: 2.7 %
ERYTHROCYTE [DISTWIDTH] IN BLOOD BY AUTOMATED COUNT: 13.3 % (ref 11–15)
GLUCOSE SERPL-MCNC: 99 MG/DL (ref 65–99)
HCT VFR BLD AUTO: 44.1 % (ref 35–45)
HDLC SERPL-MCNC: 66 MG/DL
HGB BLD-MCNC: 14.6 G/DL (ref 11.7–15.5)
LDLC SERPL CALC-MCNC: 106 MG/DL (CALC)
LYMPHOCYTES # BLD AUTO: 2478 CELLS/UL (ref 850–3900)
LYMPHOCYTES NFR BLD AUTO: 34.9 %
MCH RBC QN AUTO: 31.3 PG (ref 27–33)
MCHC RBC AUTO-ENTMCNC: 33.1 G/DL (ref 32–36)
MCV RBC AUTO: 94.4 FL (ref 80–100)
MICROALBUMIN UR-MCNC: 3 MG/DL
MONOCYTES # BLD AUTO: 689 CELLS/UL (ref 200–950)
MONOCYTES NFR BLD AUTO: 9.7 %
NEUTROPHILS # BLD AUTO: 3685 CELLS/UL (ref 1500–7800)
NEUTROPHILS NFR BLD AUTO: 51.9 %
NONHDLC SERPL-MCNC: 132 MG/DL (CALC)
PLATELET # BLD AUTO: 220 THOUSAND/UL (ref 140–400)
PMV BLD REES-ECKER: 11.1 FL (ref 7.5–12.5)
POTASSIUM SERPL-SCNC: 4.4 MMOL/L (ref 3.5–5.3)
PROT SERPL-MCNC: 6.6 G/DL (ref 6.1–8.1)
RBC # BLD AUTO: 4.67 MILLION/UL (ref 3.8–5.1)
SODIUM SERPL-SCNC: 140 MMOL/L (ref 135–146)
TRIGL SERPL-MCNC: 147 MG/DL
TSH SERPL-ACNC: 2.02 MIU/L (ref 0.4–4.5)
WBC # BLD AUTO: 7.1 THOUSAND/UL (ref 3.8–10.8)

## 2025-04-08 ENCOUNTER — APPOINTMENT (OUTPATIENT)
Dept: PRIMARY CARE | Facility: CLINIC | Age: OVER 89
End: 2025-04-08
Payer: MEDICARE

## 2025-04-08 VITALS
BODY MASS INDEX: 28.42 KG/M2 | RESPIRATION RATE: 16 BRPM | HEART RATE: 76 BPM | DIASTOLIC BLOOD PRESSURE: 71 MMHG | WEIGHT: 160.4 LBS | HEIGHT: 63 IN | OXYGEN SATURATION: 97 % | TEMPERATURE: 97.4 F | SYSTOLIC BLOOD PRESSURE: 115 MMHG

## 2025-04-08 DIAGNOSIS — K57.90 DIVERTICULOSIS: ICD-10-CM

## 2025-04-08 DIAGNOSIS — E03.9 ACQUIRED HYPOTHYROIDISM: ICD-10-CM

## 2025-04-08 DIAGNOSIS — E55.9 VITAMIN D DEFICIENCY: ICD-10-CM

## 2025-04-08 DIAGNOSIS — I10 PRIMARY HYPERTENSION: ICD-10-CM

## 2025-04-08 DIAGNOSIS — R19.5 LOOSE STOOLS: ICD-10-CM

## 2025-04-08 DIAGNOSIS — E78.49 OTHER HYPERLIPIDEMIA: Primary | ICD-10-CM

## 2025-04-08 DIAGNOSIS — N39.41 URGE INCONTINENCE OF URINE: ICD-10-CM

## 2025-04-08 DIAGNOSIS — R26.89 IMPAIRED GAIT AND MOBILITY: ICD-10-CM

## 2025-04-08 DIAGNOSIS — M62.89 PELVIC FLOOR DYSFUNCTION: ICD-10-CM

## 2025-04-08 PROCEDURE — 3078F DIAST BP <80 MM HG: CPT | Performed by: INTERNAL MEDICINE

## 2025-04-08 PROCEDURE — 1036F TOBACCO NON-USER: CPT | Performed by: INTERNAL MEDICINE

## 2025-04-08 PROCEDURE — 99214 OFFICE O/P EST MOD 30 MIN: CPT | Performed by: INTERNAL MEDICINE

## 2025-04-08 PROCEDURE — 1159F MED LIST DOCD IN RCRD: CPT | Performed by: INTERNAL MEDICINE

## 2025-04-08 PROCEDURE — 3074F SYST BP LT 130 MM HG: CPT | Performed by: INTERNAL MEDICINE

## 2025-04-08 SDOH — ECONOMIC STABILITY: FOOD INSECURITY: WITHIN THE PAST 12 MONTHS, YOU WORRIED THAT YOUR FOOD WOULD RUN OUT BEFORE YOU GOT MONEY TO BUY MORE.: NEVER TRUE

## 2025-04-08 SDOH — ECONOMIC STABILITY: FOOD INSECURITY: WITHIN THE PAST 12 MONTHS, THE FOOD YOU BOUGHT JUST DIDN'T LAST AND YOU DIDN'T HAVE MONEY TO GET MORE.: NEVER TRUE

## 2025-04-08 ASSESSMENT — LIFESTYLE VARIABLES
AUDIT-C TOTAL SCORE: 0
SKIP TO QUESTIONS 9-10: 1
HOW OFTEN DO YOU HAVE SIX OR MORE DRINKS ON ONE OCCASION: NEVER
HOW OFTEN DO YOU HAVE A DRINK CONTAINING ALCOHOL: NEVER
HOW MANY STANDARD DRINKS CONTAINING ALCOHOL DO YOU HAVE ON A TYPICAL DAY: PATIENT DOES NOT DRINK

## 2025-04-08 NOTE — PROGRESS NOTES
"Chief Complaint/HPI:    Follow up:     Stomach Pain: Still having trouble with her stomach. She did a colonoscopy but everything came back normal. She is having achy pain all over her abdomen sometimes, but indorses  daily heartburn.   Her stool been loose and it takes her more time to clean herself. She admits to eating tortilla with cheese for dinner nightly. Patient does use almond milk, she does eat cheese, she does not eat a lot of red meat.     Leg pain: R lateral side and bottom of foot and left medial lower leg pain. Started few months ago. Imaging shows  multilevel spondylosis and moderate facet disease worse in the  lower lumbar spine. Minimal retrolisthesis L1 on L2 and L2 on L3.  Patient feeling fine and the pain is resolved.       urinary frequency/incontinence: H/o uterine prolapse. The patient states that she can go from seated to standing and notices that she leaks some urine. Patient was advised to do Kegel exercises, she still forgets to do them. She has noticed decreased urination. She also does not empty her bowels completely. She does get some rectal leaking. She had colonoscopy completed at [facility].   Patient does see Dr Ray, she is supposed to follow up with Dr Ray for possible bladder stimulation device, she states. Patient states that she does not want to have \"needles\" in her back , patient has never discussed Botox option, (not certain if this is a reasonable option). Patient has been going to bathroom every hour, she has been drinking a lot of water since illness Patient had colonoscopy completed. She no longer is having abdominal pain that she had before.        memory issues: memory issues have not changed now, she is stable. Patient has current memory issues. She denies any prior memory issues. Patient was advised that memory issues are age related.       right hand numbness: patient only notes numbness in the hand when she wakes up in the morning. She also has thenar numbness as well. " Dr Garcia ordered a cock up wrist splint. The splint is effective the patient states.      HTN/MV prolapse: Currently on metoprolol,  she does note some lightheadedness.  patient has follow up appointment with cardiology. Patient does see Dr Dennis.  Patient is to go for PT also, she states     Dizziness: Patient felt dizzy once last month , she has not fallen, she is careful at home.  She stated she feels shaky sometimes for few seconds and it goes away. She gets lightheaded when getting out of bed, after sitting in a chair for a few hours, the patient felt fine     hyperlipidemia:  patient no longer takes med therapy     hypothyroidism: takes levothyroxine 75 mcg  daily.    Recent thyroid ultrasound with multiple nodules appreciated. Right sided 25 mm TIRADS 3 nodule that appears to have increased in size since 2019 US. Patient had needle aspiration completed     Sleep issues:  patient states that she had sleep study completed at [place]. She does not wear a CPAP. She did see sleep medicine, she apparently no longer needs to wear CPAP    She is sleepy all the time and always feeling tired, if patient sleeps during the day, she is not able to sleep at night.      ROS otherwise negative aside from what was mentioned above in HPI.      Patient Active Problem List   Diagnosis    Abdominal pain, LLQ (left lower quadrant)    Abnormal CT scan, colon    Abrasion    Accidental fall    Acute diverticulitis    Acute generalized abdominal pain    Acute maxillary sinusitis    Acute nasopharyngitis    Acute pancreatitis    Acute UTI    Adhesive capsulitis of shoulder    Allergic contact dermatitis due to adhesives    Anemia    Anxiety    Arthritis    Bilateral impacted cerumen    Carpal tunnel syndrome of right wrist    Cataract, left    Clindamycin adverse reaction    Contact dermatitis due to plant    Diaphragmatic hernia    Diarrhea, unspecified    Disorder of bone and cartilage    Disorder of skin or subcutaneous  tissue    Diverticulosis of large intestine    Dysuria    Ecchymosis    Fecal incontinence    Flatulence, eructation, and gas pain    GERD without esophagitis    Hair loss    Hematuria    Hyperlipidemia    Hypertension    Hypothyroidism    Incomplete bladder emptying    Increased frequency of urination    Laceration of right hand without foreign body    Left knee pain    Lesion of spleen    Memory loss    Midline cystocele    Mitral valve disease    Limb cramp    Myalgia    Neck pain    Atypical chest pain    Nocturia    Nontoxic single thyroid nodule    Obstructive sleep apnea    Osteoarthritis of knee    Pain in both feet    Pain in joint involving pelvic region and thigh    Pain of right lower extremity    Poison ivy    Postmenopausal bone loss    Primary dysthymia    Rectocele, female    Restless legs syndrome    Right hand paresthesia    Antibiotic-induced allergic rash    Skin sensation disturbance    Swallowing disorder    Swelling of left lower extremity    Unsteady gait    URI (upper respiratory infection)    Vision changes    Vitamin D deficiency    Loose stools    Dermatitis    Unknown cause of injury    Colloid thyroid nodule    Acute sinusitis    Acute viral syndrome    Contact dermatitis, allergic    Facial rash    Skin lesions    Cystitis    Nipple pain    Other chest pain    Impacted cerumen of right ear    Cortical cataract of right eye    Diverticulosis    Gastroesophageal reflux disease    Muscle cramps    Muscle weakness of lower extremity    Nocturnal leg cramps    Mitral valve prolapse    Actinic keratosis    Carcinoma in situ of skin    Inflamed seborrheic keratosis    Neurogenic bladder    Photoaged skin    Urethral stricture    Functional disorder of bladder    Breast tenderness    Dry eye syndrome, bilateral    Monocular diplopia of left eye    Dehydration    History of anemia    Injury of free lower extremity    Routine general medical examination at health care facility    Mild  protein-calorie malnutrition (Multi)    Urge incontinence of urine    Pelvic floor dysfunction    Muscle weakness    Exertional dyspnea    Chronic bilateral low back pain without sciatica    Impaired gait and mobility    Influenza A         Past Medical History:   Diagnosis Date    Acute follicular conjunctivitis, bilateral 07/05/2024    Anesthesia of skin     Numbness and tingling in right hand    Diverticulitis 01/13/2024    Personal history of diseases of the blood and blood-forming organs and certain disorders involving the immune mechanism     History of anemia    Personal history of other medical treatment 05/06/2021    History of screening mammography    Unspecified cataract     Cataracts, bilateral     Past Surgical History:   Procedure Laterality Date    BLADDER SURGERY  09/07/2017    Bladder Surgery    BREAST BIOPSY  09/07/2017    Biopsy Breast Open    CARDIAC SURGERY  09/07/2017    Heart Surgery    COLONOSCOPY  11/2024    FOOT SURGERY  09/07/2017    Foot Surgery    HYSTERECTOMY  09/07/2017    Hysterectomy    OTHER SURGICAL HISTORY  09/05/2022    Uvulopalatopharyngoplasty    OTHER SURGICAL HISTORY  03/06/2020    Surgery    OTHER SURGICAL HISTORY  11/09/2019    Cardiac catheterization    OTHER SURGICAL HISTORY  11/09/2019    Tonsillectomy     Social History     Social History Narrative    Not on file         ALLERGIES  Penicillins, Caffeine, Ciprofloxacin, Donepezil, Fexofenadine, Lanolin, Lipase-protease-amylase, Methen-m.blue-s.phos-phsal-hyo, Methylprednisolone, Mirabegron, Naproxen, Norfloxacin, Rivastigmine, Clindamycin, Nitrofurantoin, Ofloxacin, Quinolones, Sulfamethoxazole, and Trimethoprim      MEDICATIONS  Current Outpatient Medications on File Prior to Visit   Medication Sig Dispense Refill    cholecalciferol (Vitamin D-3) 25 MCG (1000 UT) tablet Take 1 tablet (1,000 Units) by mouth once daily. 90 tablet 1    famotidine (Pepcid) 40 mg tablet Take 1 tablet (40 mg) by mouth once daily. 90 tablet 1  "   levothyroxine (Synthroid, Levoxyl) 75 mcg tablet Take 1 tablet (75 mcg) by mouth once daily. 90 tablet 1    metoprolol succinate XL (Toprol-XL) 25 mg 24 hr tablet Take 1 tablet (25 mg) by mouth once daily. 90 tablet 3    multivitamin (Daily Multi-Vitamin) tablet Take 1 tablet by mouth once daily.       No current facility-administered medications on file prior to visit.         PHYSICAL EXAM  /71 (BP Location: Left arm, Patient Position: Sitting, BP Cuff Size: Adult)   Pulse 76   Temp 36.3 °C (97.4 °F) (Temporal)   Resp 16   Ht 1.6 m (5' 3\")   Wt 72.8 kg (160 lb 6.4 oz)   LMP  (LMP Unknown)   SpO2 97%   BMI 28.41 kg/m²   Body mass index is 28.41 kg/m².  CONSTITUTIONAL - well nourished, well developed, looks like stated age, in no acute distress, not ill-appearing, and not tired appearing    SKIN - normal skin color and pigmentation, normal skin turgor without rash, lesions, or nodules visualized on exposed skin  HEAD - no trauma, normocephalic  EYES - extraocular muscles are intact, and normal external exam, wears glasses  ENT - auricles are intact  NECK - supple without rigidity, no neck mass was noted, no obvious thyromegaly  CHEST - clear to auscultation, no wheezing, no crackles and no rales, good effort, diminished breath sounds in the bases only,  occasional cough noted with deep breathing  CARDIAC - regular rate and regular rhythm, no skipped beats, no murmur, no carotid bruits noted  EXTREMITIES - no edema, no deformities  ABDOMEN - stomach is slightly distended, but soft,  no palpable masses. Patient admits to burning in the chest associated with acid reflux symptoms , sigmoidoscopy does note diverticula, negative hydrogen breath test noted.     NEUROLOGICAL - normal gait, she has some balance issues,  normal motor,  alert, oriented and no focal signs  PSYCHIATRIC - alert, pleasant and cordial, age-appropriate, she does c/o of some memory loss  IMMUNOLOGIC - no cervical lymphadenopathy    "      ASSESSMENT/PLAN  Problem List Items Addressed This Visit       Diverticulosis    Current Assessment & Plan     Encourage a high fiber diet.          Relevant Orders    CBC and Auto Differential    Comprehensive Metabolic Panel    Hyperlipidemia - Primary    Current Assessment & Plan     LDL is fair, patient is 89 years old, will not add med therapy         Relevant Orders    CBC and Auto Differential    Comprehensive Metabolic Panel    Lipid Panel    Hypertension    Current Assessment & Plan     BP is controlled, no change for now, will refer to PT for gait training         Relevant Orders    Albumin-Creatinine Ratio, Urine Random    CBC and Auto Differential    Comprehensive Metabolic Panel    Hypothyroidism    Current Assessment & Plan     No change in med therapy, monitor labs         Relevant Orders    CBC and Auto Differential    Comprehensive Metabolic Panel    TSH with reflex to Free T4 if abnormal    Impaired gait and mobility    Current Assessment & Plan     Refer to PT for assessment and gait training         Relevant Orders    Referral to Physical Therapy    CBC and Auto Differential    Comprehensive Metabolic Panel    Loose stools    Relevant Orders    Celiac Panel    CBC and Auto Differential    Comprehensive Metabolic Panel    Pelvic floor dysfunction    Current Assessment & Plan     Patient develops occasional loss of stool and bladder, encourage follow up with urogynecology         Relevant Orders    CBC and Auto Differential    Comprehensive Metabolic Panel    Urge incontinence of urine    Relevant Orders    CBC and Auto Differential    Comprehensive Metabolic Panel    Vitamin D deficiency    Current Assessment & Plan     Takes vitamin d, no change for now          Relevant Orders    CBC and Auto Differential    Comprehensive Metabolic Panel    Vitamin D 25-Hydroxy,Total (for eval of Vitamin D levels)     Check a celiac panel, advise follow up with Dr Ray for stool and bladder  incontinence    Gian Traylor MD

## 2025-05-19 ENCOUNTER — TELEPHONE (OUTPATIENT)
Dept: PRIMARY CARE | Facility: CLINIC | Age: OVER 89
End: 2025-05-19
Payer: MEDICARE

## 2025-05-19 DIAGNOSIS — Z86.2 HISTORY OF ANEMIA: ICD-10-CM

## 2025-05-19 DIAGNOSIS — K21.9 GERD WITHOUT ESOPHAGITIS: ICD-10-CM

## 2025-05-19 DIAGNOSIS — R53.83 LETHARGY: ICD-10-CM

## 2025-05-19 DIAGNOSIS — E03.9 ACQUIRED HYPOTHYROIDISM: ICD-10-CM

## 2025-05-19 DIAGNOSIS — E86.0 DEHYDRATION: Primary | ICD-10-CM

## 2025-05-19 NOTE — TELEPHONE ENCOUNTER
Patient called in and wanted to leave a message that she is tired all the time. She wants to know if there is something that is missing in maybe her blood work that could be causing this besides her age.  Patient says she can sleep half the day and still sleep all night but is still exhausted.   Has been going on for 6 months or mor.    Please advise.

## 2025-05-22 NOTE — TELEPHONE ENCOUNTER
Patient denies any weight loss or any symptoms of depression. She states that she does have urinary and fecal incontinence and has to get up every few hours to use the bathroom. She is wondering if this could be why she is tired all of the time.

## 2025-05-23 RX ORDER — PANTOPRAZOLE SODIUM 40 MG/1
40 TABLET, DELAYED RELEASE ORAL DAILY
Qty: 30 TABLET | Refills: 1 | Status: SHIPPED | OUTPATIENT
Start: 2025-05-23 | End: 2025-07-22

## 2025-05-23 NOTE — TELEPHONE ENCOUNTER
Patient was notified. She also states that she has been experiencing burning in her throat in the evening and when lying down. Has been going on for several months and she believes that she may have acid reflux. Asking if there is something that she should be taking for this?

## 2025-06-06 LAB
ALBUMIN SERPL-MCNC: 4.2 G/DL (ref 3.6–5.1)
ALBUMIN/CREAT UR: 13 MG/G CREAT
ALP SERPL-CCNC: 54 U/L (ref 37–153)
ALT SERPL-CCNC: 11 U/L (ref 6–29)
ANION GAP SERPL CALCULATED.4IONS-SCNC: 10 MMOL/L (CALC) (ref 7–17)
APPEARANCE UR: ABNORMAL
AST SERPL-CCNC: 20 U/L (ref 10–35)
BACTERIA #/AREA URNS HPF: ABNORMAL /HPF
BASOPHILS # BLD AUTO: 53 CELLS/UL (ref 0–200)
BASOPHILS NFR BLD AUTO: 0.8 %
BILIRUB SERPL-MCNC: 1.2 MG/DL (ref 0.2–1.2)
BILIRUB UR QL STRIP: NEGATIVE
BUN SERPL-MCNC: 21 MG/DL (ref 7–25)
CALCIUM SERPL-MCNC: 9.4 MG/DL (ref 8.6–10.4)
CHLORIDE SERPL-SCNC: 106 MMOL/L (ref 98–110)
CO2 SERPL-SCNC: 26 MMOL/L (ref 20–32)
COLOR UR: YELLOW
CREAT SERPL-MCNC: 0.9 MG/DL (ref 0.6–0.95)
CREAT UR-MCNC: 156 MG/DL (ref 20–275)
EGFRCR SERPLBLD CKD-EPI 2021: 61 ML/MIN/1.73M2
EOSINOPHIL # BLD AUTO: 211 CELLS/UL (ref 15–500)
EOSINOPHIL NFR BLD AUTO: 3.2 %
ERYTHROCYTE [DISTWIDTH] IN BLOOD BY AUTOMATED COUNT: 13.9 % (ref 11–15)
GLIADIN IGA SER IA-ACNC: 8.6 U/ML
GLIADIN IGG SER IA-ACNC: <1 U/ML
GLUCOSE SERPL-MCNC: 105 MG/DL (ref 65–99)
GLUCOSE UR QL STRIP: NEGATIVE
HCT VFR BLD AUTO: 44.6 % (ref 35–45)
HGB BLD-MCNC: 14.7 G/DL (ref 11.7–15.5)
HGB UR QL STRIP: NEGATIVE
HYALINE CASTS #/AREA URNS LPF: ABNORMAL /LPF
IGA SERPL-MCNC: 159 MG/DL (ref 70–320)
KETONES UR QL STRIP: NEGATIVE
LEUKOCYTE ESTERASE UR QL STRIP: ABNORMAL
LYMPHOCYTES # BLD AUTO: 2336 CELLS/UL (ref 850–3900)
LYMPHOCYTES NFR BLD AUTO: 35.4 %
MCH RBC QN AUTO: 32 PG (ref 27–33)
MCHC RBC AUTO-ENTMCNC: 33 G/DL (ref 32–36)
MCV RBC AUTO: 97 FL (ref 80–100)
MICROALBUMIN UR-MCNC: 2.1 MG/DL
MONOCYTES # BLD AUTO: 554 CELLS/UL (ref 200–950)
MONOCYTES NFR BLD AUTO: 8.4 %
NEUTROPHILS # BLD AUTO: 3445 CELLS/UL (ref 1500–7800)
NEUTROPHILS NFR BLD AUTO: 52.2 %
NITRITE UR QL STRIP: NEGATIVE
PH UR STRIP: 5.5 [PH] (ref 5–8)
PLATELET # BLD AUTO: 222 THOUSAND/UL (ref 140–400)
PMV BLD REES-ECKER: 11 FL (ref 7.5–12.5)
POTASSIUM SERPL-SCNC: 4 MMOL/L (ref 3.5–5.3)
PROT SERPL-MCNC: 6.1 G/DL (ref 6.1–8.1)
PROT UR QL STRIP: ABNORMAL
RBC # BLD AUTO: 4.6 MILLION/UL (ref 3.8–5.1)
RBC #/AREA URNS HPF: ABNORMAL /HPF
SERVICE CMNT-IMP: ABNORMAL
SODIUM SERPL-SCNC: 142 MMOL/L (ref 135–146)
SP GR UR STRIP: 1.02 (ref 1–1.03)
SQUAMOUS #/AREA URNS HPF: ABNORMAL /HPF
TSH SERPL-ACNC: 2.27 MIU/L (ref 0.4–4.5)
TTG IGA SER-ACNC: <1 U/ML
TTG IGG SER-ACNC: <1 U/ML
WBC # BLD AUTO: 6.6 THOUSAND/UL (ref 3.8–10.8)
WBC #/AREA URNS HPF: ABNORMAL /HPF

## 2025-06-16 ENCOUNTER — TELEPHONE (OUTPATIENT)
Dept: PRIMARY CARE | Facility: CLINIC | Age: OVER 89
End: 2025-06-16
Payer: MEDICARE

## 2025-06-16 DIAGNOSIS — N35.92 STRICTURE OF FEMALE URETHRA, UNSPECIFIED STRICTURE TYPE: ICD-10-CM

## 2025-06-16 DIAGNOSIS — N39.0 RECURRENT UTI: Primary | ICD-10-CM

## 2025-06-16 DIAGNOSIS — N81.6 RECTOCELE, FEMALE: ICD-10-CM

## 2025-06-16 DIAGNOSIS — N39.41 URGE INCONTINENCE OF URINE: ICD-10-CM

## 2025-06-18 ENCOUNTER — DOCUMENTATION (OUTPATIENT)
Dept: PHYSICAL THERAPY | Facility: HOSPITAL | Age: OVER 89
End: 2025-06-18
Payer: MEDICARE

## 2025-06-18 NOTE — PROGRESS NOTES
Physical Therapy    Discharge Summary    Name: Consuelo Andres  MRN: [id number]  : 1934  Date: 25    Discharge Summary: PT    Discharge Information: Date of last visit 3/18/25, Date of evaluation 24, Number of attended visits 10, Referred by Dr. Anne Streeter, and Referred for Pain of RLE, chronic anshu low back pain without sciatica    Therapy Summary: Initiation of PT services delayed due to failure for pt to schedule PT follow up appointments after initial evaluation. Dr. Shania Streeter reached out to this PT to get pt scheduled consistently for PT. Pt participated in skilled PT services from 25-3/18/25 with pt reporting 0/10 pain at end of last few treatment sessions. Pt canceled last scheduled appointment due to dizziness with failure to reschedule.    Rehab Discharge Reason: Failed to schedule and/or keep follow-up appointment(s) and Other POC lapse 25, pt had to cancel recheck scheduled for 3/25/25 due to dizziness with reports she would call to reschedule with failure to reschedule

## 2025-06-20 DIAGNOSIS — N39.41 URGE INCONTINENCE OF URINE: ICD-10-CM

## 2025-06-20 DIAGNOSIS — N39.0 RECURRENT UTI: ICD-10-CM

## 2025-06-20 DIAGNOSIS — N81.6 RECTOCELE, FEMALE: ICD-10-CM

## 2025-06-20 DIAGNOSIS — N35.92 STRICTURE OF FEMALE URETHRA, UNSPECIFIED STRICTURE TYPE: ICD-10-CM

## 2025-06-20 RX ORDER — DOXYCYCLINE 100 MG/1
100 CAPSULE ORAL 2 TIMES DAILY
Qty: 14 CAPSULE | Refills: 0 | Status: SHIPPED | OUTPATIENT
Start: 2025-06-20 | End: 2025-06-30

## 2025-06-20 NOTE — TELEPHONE ENCOUNTER
Patient called back in and I gave provider message. She will take the referral to urogynecology.   Patient asked if all the pain she is having is due to a UTI?

## 2025-06-23 RX ORDER — DOXYCYCLINE 100 MG/1
100 CAPSULE ORAL 2 TIMES DAILY
Qty: 14 CAPSULE | Refills: 0 | Status: SHIPPED | OUTPATIENT
Start: 2025-06-23 | End: 2025-06-30

## 2025-06-29 DIAGNOSIS — K21.9 GERD WITHOUT ESOPHAGITIS: ICD-10-CM

## 2025-06-29 ASSESSMENT — ENCOUNTER SYMPTOMS
GASTROINTESTINAL NEGATIVE: 1
PSYCHIATRIC NEGATIVE: 1
PALPITATIONS: 0
SHORTNESS OF BREATH: 0
ENDOCRINE NEGATIVE: 1
LIGHT-HEADEDNESS: 0
HEMATOLOGIC/LYMPHATIC NEGATIVE: 1
EYES NEGATIVE: 1
MUSCULOSKELETAL NEGATIVE: 1

## 2025-06-30 ENCOUNTER — OFFICE VISIT (OUTPATIENT)
Dept: CARDIOLOGY | Facility: HOSPITAL | Age: OVER 89
End: 2025-06-30
Payer: MEDICARE

## 2025-06-30 VITALS
DIASTOLIC BLOOD PRESSURE: 82 MMHG | HEART RATE: 74 BPM | BODY MASS INDEX: 28.39 KG/M2 | OXYGEN SATURATION: 97 % | WEIGHT: 160.2 LBS | SYSTOLIC BLOOD PRESSURE: 142 MMHG | HEIGHT: 63 IN

## 2025-06-30 DIAGNOSIS — E78.49 OTHER HYPERLIPIDEMIA: ICD-10-CM

## 2025-06-30 DIAGNOSIS — R06.09 EXERTIONAL DYSPNEA: ICD-10-CM

## 2025-06-30 DIAGNOSIS — I34.1 MITRAL VALVE PROLAPSE: Primary | ICD-10-CM

## 2025-06-30 DIAGNOSIS — I05.9 MITRAL VALVE DISEASE: ICD-10-CM

## 2025-06-30 DIAGNOSIS — I10 PRIMARY HYPERTENSION: ICD-10-CM

## 2025-06-30 PROCEDURE — 1159F MED LIST DOCD IN RCRD: CPT | Performed by: CLINICAL NURSE SPECIALIST

## 2025-06-30 PROCEDURE — 3077F SYST BP >= 140 MM HG: CPT | Performed by: CLINICAL NURSE SPECIALIST

## 2025-06-30 PROCEDURE — 3079F DIAST BP 80-89 MM HG: CPT | Performed by: CLINICAL NURSE SPECIALIST

## 2025-06-30 PROCEDURE — 1036F TOBACCO NON-USER: CPT | Performed by: CLINICAL NURSE SPECIALIST

## 2025-06-30 PROCEDURE — 99212 OFFICE O/P EST SF 10 MIN: CPT | Performed by: CLINICAL NURSE SPECIALIST

## 2025-06-30 PROCEDURE — 99213 OFFICE O/P EST LOW 20 MIN: CPT | Performed by: CLINICAL NURSE SPECIALIST

## 2025-06-30 PROCEDURE — 1160F RVW MEDS BY RX/DR IN RCRD: CPT | Performed by: CLINICAL NURSE SPECIALIST

## 2025-06-30 RX ORDER — FAMOTIDINE 40 MG/1
40 TABLET, FILM COATED ORAL DAILY
Qty: 90 TABLET | Refills: 1 | Status: SHIPPED | OUTPATIENT
Start: 2025-06-30

## 2025-06-30 ASSESSMENT — ENCOUNTER SYMPTOMS
DYSPNEA ON EXERTION: 1
DIZZINESS: 1

## 2025-06-30 NOTE — PROGRESS NOTES
"Primary Cardiologist: Dr. Dennis    Chief Complaint:   No chief complaint on file.     History Of Present Illness:    Consuelo Andres is a 89 y.o. female with past medical history of HTN, DL, mitral valve prolapse, KATHRYN not using CPAP presents today for shortness of breath.   At last office visit, no medication adjustments were made and no additional testing was ordered.     Today, Consuelo Andres is feeling well. She denies worsening shortness of breath today, reports that she does get \"slightly winded\" when taking care of her yard or doing heavy exertion but reports this has been stable for over 6 months, no worsening and attributes it to aging. Continues to have atypical chest pain at times but reports that this has been \"ongoing for years\". Patient denies dizziness/lightheadedness. Patient reports that she monitors her blood pressure at home and that it is stable, systolic's less than 130 mmHg. Patient is worried about her short term memory loss today.     Last Recorded Vitals:  Visit Vitals  /82 (BP Location: Right arm, Patient Position: Sitting, BP Cuff Size: Adult)   Pulse 74   Ht 1.6 m (5' 3\")   Wt 72.7 kg (160 lb 3.2 oz)   LMP  (LMP Unknown)   SpO2 97%   BMI 28.38 kg/m²   OB Status Postmenopausal   Smoking Status Never   BSA 1.8 m²        Past Medical History:  She has a past medical history of Acute follicular conjunctivitis, bilateral (07/05/2024), Anesthesia of skin, Diverticulitis (01/13/2024), Personal history of diseases of the blood and blood-forming organs and certain disorders involving the immune mechanism, Personal history of other medical treatment (05/06/2021), and Unspecified cataract.    Past Surgical History:  She has a past surgical history that includes Bladder surgery (09/07/2017); Breast biopsy (09/07/2017); Cardiac surgery (09/07/2017); Foot surgery (09/07/2017); Hysterectomy (09/07/2017); Other surgical history (09/05/2022); Other surgical history (03/06/2020); Other surgical " history (11/09/2019); Other surgical history (11/09/2019); and Colonoscopy (11/2024).      Social History:  She reports that she has never smoked. She has never used smokeless tobacco. She reports that she does not currently use alcohol. She reports that she does not use drugs.    Family History:  Family History   Problem Relation Name Age of Onset    Other (cva) Mother      Coronary artery disease Father      Coronary artery disease Brother      Other (malignant neoplasm) Brother      Stomach cancer Brother          Allergies:  Penicillins, Caffeine, Ciprofloxacin, Donepezil, Fexofenadine, Lanolin, Lipase-protease-amylase, Methen-m.blue-s.phos-phsal-hyo, Methylprednisolone, Mirabegron, Naproxen, Norfloxacin, Rivastigmine, Clindamycin, Nitrofurantoin, Ofloxacin, Pantoprazole, Quinolones, Sulfamethoxazole, and Trimethoprim    Outpatient Medications:  Current Outpatient Medications   Medication Instructions    cholecalciferol (VITAMIN D-3) 1,000 Units, oral, Daily    doxycycline (VIBRAMYCIN) 100 mg, oral, 2 times daily, Take with at least 8 ounces (large glass) of water, do not lie down for 30 minutes after    famotidine (PEPCID) 40 mg, oral, Daily    levothyroxine (SYNTHROID, LEVOXYL) 75 mcg, oral, Daily    metoprolol succinate XL (TOPROL-XL) 25 mg, oral, Daily    multivitamin (Daily Multi-Vitamin) tablet 1 tablet, Daily    pantoprazole (PROTONIX) 40 mg, oral, Daily, Do not crush, chew, or split.         Review of Systems   Constitutional: Negative for malaise/fatigue.   HENT: Negative.     Eyes: Negative.    Cardiovascular:  Positive for chest pain (Ongoing for >6 months, lasts less than 15 minutes, with sitting, not associated with shortness of breath.) and dyspnea on exertion (Ongoing for >6 months). Negative for leg swelling and palpitations.   Respiratory:  Negative for shortness of breath.    Endocrine: Negative.    Hematologic/Lymphatic: Negative.    Skin: Negative.    Musculoskeletal: Negative.     Gastrointestinal: Negative.    Genitourinary: Negative.    Neurological:  Positive for dizziness (occasional). Negative for light-headedness.   Psychiatric/Behavioral: Negative.          Physical Exam  Vitals reviewed.   Constitutional:       Appearance: Normal appearance.   HENT:      Head: Normocephalic.      Mouth/Throat:      Mouth: Mucous membranes are moist.   Cardiovascular:      Rate and Rhythm: Normal rate and regular rhythm.      Pulses: Normal pulses.      Heart sounds: Normal heart sounds. No murmur heard.     No friction rub. No gallop.   Pulmonary:      Effort: Pulmonary effort is normal.      Breath sounds: Normal breath sounds. No wheezing, rhonchi or rales.   Abdominal:      General: Bowel sounds are normal.      Palpations: Abdomen is soft.   Musculoskeletal:         General: Normal range of motion.      Cervical back: Normal range of motion.      Right lower leg: No edema.      Left lower leg: No edema.   Skin:     General: Skin is warm and dry.   Neurological:      General: No focal deficit present.      Mental Status: She is alert and oriented to person, place, and time.   Psychiatric:         Mood and Affect: Mood normal.         Behavior: Behavior normal.         Thought Content: Thought content normal.         Judgment: Judgment normal.           Last Labs:  CBC -  Lab Results   Component Value Date    WBC 6.6 06/05/2025    HGB 14.7 06/05/2025    HCT 44.6 06/05/2025    MCV 97.0 06/05/2025     06/05/2025       CMP -  Lab Results   Component Value Date    CALCIUM 9.4 06/05/2025    PROT 6.1 06/05/2025    ALBUMIN 4.2 06/05/2025    AST 20 06/05/2025    ALT 11 06/05/2025    ALKPHOS 54 06/05/2025    BILITOT 1.2 06/05/2025       LIPID PANEL -   Lab Results   Component Value Date    CHOL 198 04/01/2025    TRIG 147 04/01/2025    HDL 66 04/01/2025    CHHDL 3.0 04/01/2025    LDLF 120 (H) 01/13/2023    VLDL 23 07/02/2024    NHDL 132 (H) 04/01/2025       RENAL FUNCTION PANEL -   Lab Results    Component Value Date    GLUCOSE 105 (H) 06/05/2025     06/05/2025    K 4.0 06/05/2025     06/05/2025    CO2 26 06/05/2025    ANIONGAP 10 06/05/2025    BUN 21 06/05/2025    CREATININE 0.90 06/05/2025    CALCIUM 9.4 06/05/2025    ALBUMIN 4.2 06/05/2025        Lab Results   Component Value Date    BNP 66 05/25/2022       Last Cardiology Tests:  ECG:  No results found for this or any previous visit from the past 1095 days.      Echo:  TTE 8/2024:  1. The left ventricular systolic function is normal, with a Galo's biplane calculated ejection fraction of 56%.   2. Spectral Doppler shows an impaired relaxation pattern of left ventricular diastolic filling.   3. There is normal right ventricular global systolic function.      TTE 5/12/22  The left ventricular systolic function is normal with a 60-65% estimated ejection fraction.   2. Spectral Doppler shows an impaired relaxation pattern of left ventricular diastolic filling.   3. There is a moderate pleural effusion.  Ejection Fractions:  EF   Date/Time Value Ref Range Status   08/22/2024 01:52 PM 56 %        Cath:  No results found for this or any previous visit from the past 1095 days.      Stress Test:  NM Stress Test 3/21/22:  IMPRESSION:  1. Normal stress myocardial perfusion imaging in response to  pharmacologic stress.  2. Well-maintained left ventricular function.  Cardiac Imaging:  No results found for this or any previous visit from the past 1095 days.        Lab review: I have personally reviewed the laboratory result(s)     Assessment/Plan   Consuelo Andres is a 89 y.o. female with past medical history of HTN, DL, mitral valve prolapse, KATHRYN not using CPAP  presents today for shortness of breath.    At last office visit, no medication adjustments were made and no additional testing was ordered.        Atypical chest pain  History of atypical chest pain   NM Stress 3/22: Normal perfusion  Today, continues to endorse atypical chest pain that has  "been \"ongoing for years\". She denies chest pain with exertion.   No additional testing recommended at this time.   Discussed with patient to call office if she develops any chest pain/pressure with exertion.     Hypertension  Today BP in office 140/78  Elevated today in office, home readings reported as SBP always under 130 mmHg.   Continue on metoprolol Xl 25 mg daily    Dyslipidemia  Lipid panel 4/25: Cholesterol 198 HDL 66  triglycerides 147  No longer taking statin and would not like to resume.      History of MV prolapse  TTE 8/24: Mild mitral valve regurgitation   TTE 5/12/22: Mitral Valve: The mitral valve is normal in structure. There is no evidence of mitral valve regurgitation.  Today, denies worsening shortness of breath, no LE edema.   Continue to monitor clinically, recheck echocardiogram if develops HF symptoms      Bridgette Maxwell, CHASTITY-CNP   "

## 2025-06-30 NOTE — PATIENT INSTRUCTIONS
A lab has been ordered to be done next time you get labs.   If you develop worsening shortness of breath/chest pain, please call our office or go to ED for further assistance.   Call our office with any new cardiac concerns  Continue current medications  Continue heart-healthy diet. A diet low in sodium, low in cholesterol, limiting red meats and eating whole foods.   Follow up with Dr. Dennis in November as scheduled.

## 2025-10-09 ENCOUNTER — APPOINTMENT (OUTPATIENT)
Dept: PRIMARY CARE | Facility: CLINIC | Age: OVER 89
End: 2025-10-09
Payer: MEDICARE